# Patient Record
Sex: MALE | URBAN - METROPOLITAN AREA
[De-identification: names, ages, dates, MRNs, and addresses within clinical notes are randomized per-mention and may not be internally consistent; named-entity substitution may affect disease eponyms.]

---

## 2023-08-15 ENCOUNTER — APPOINTMENT (RX ONLY)
Dept: URBAN - METROPOLITAN AREA CLINIC 77 | Facility: CLINIC | Age: 48
Setting detail: DERMATOLOGY
End: 2023-08-15

## 2023-08-15 DIAGNOSIS — L71.8 OTHER ROSACEA: ICD-10-CM

## 2023-08-15 DIAGNOSIS — Z71.89 OTHER SPECIFIED COUNSELING: ICD-10-CM

## 2023-08-15 PROCEDURE — ? MEDICATION COUNSELING

## 2023-08-15 PROCEDURE — ? LOCATION MARKER (DETAILED)

## 2023-08-15 PROCEDURE — ? COUNSELING

## 2023-08-15 PROCEDURE — ? PRESCRIPTION

## 2023-08-15 PROCEDURE — ? TREATMENT REGIMEN

## 2023-08-15 PROCEDURE — ? SUNSCREEN RECOMMENDATIONS

## 2023-08-15 PROCEDURE — 99203 OFFICE O/P NEW LOW 30 MIN: CPT

## 2023-08-15 RX ORDER — CLINDAMYCIN PHOSPHATE 10 MG/ML
SOLUTION TOPICAL
Qty: 60 | Refills: 0 | Status: ERX | COMMUNITY
Start: 2023-08-15

## 2023-08-15 RX ORDER — MINOCYCLINE HYDROCHLORIDE 100 MG/1
CAPSULE ORAL BID
Qty: 60 | Refills: 3 | Status: ERX | COMMUNITY
Start: 2023-08-15

## 2023-08-15 RX ADMIN — CLINDAMYCIN PHOSPHATE: 10 SOLUTION TOPICAL at 00:00

## 2023-08-15 RX ADMIN — MINOCYCLINE HYDROCHLORIDE: 100 CAPSULE ORAL at 00:00

## 2023-08-15 ASSESSMENT — LOCATION ZONE DERM: LOCATION ZONE: FACE

## 2023-08-15 ASSESSMENT — LOCATION DETAILED DESCRIPTION DERM
LOCATION DETAILED: RIGHT CENTRAL MALAR CHEEK
LOCATION DETAILED: LEFT CENTRAL MALAR CHEEK

## 2023-08-15 ASSESSMENT — LOCATION SIMPLE DESCRIPTION DERM
LOCATION SIMPLE: LEFT CHEEK
LOCATION SIMPLE: RIGHT CHEEK

## 2023-08-15 NOTE — PROCEDURE: MEDICATION COUNSELING
Sski Pregnancy And Lactation Text: This medication is Pregnancy Category D and isn't considered safe during pregnancy. It is excreted in breast milk.
Azelaic Acid Pregnancy And Lactation Text: This medication is considered safe during pregnancy and breast feeding.
Dupixent Counseling: I discussed with the patient the risks of dupilumab including but not limited to eye infection and irritation, cold sores, injection site reactions, worsening of asthma, allergic reactions and increased risk of parasitic infection.  Live vaccines should be avoided while taking dupilumab. Dupilumab will also interact with certain medications such as warfarin and cyclosporine. The patient understands that monitoring is required and they must alert us or the primary physician if symptoms of infection or other concerning signs are noted.
Ivermectin Counseling:  Patient instructed to take medication on an empty stomach with a full glass of water.  Patient informed of potential adverse effects including but not limited to nausea, diarrhea, dizziness, itching, and swelling of the extremities or lymph nodes.  The patient verbalized understanding of the proper use and possible adverse effects of ivermectin.  All of the patient's questions and concerns were addressed.
Colchicine Pregnancy And Lactation Text: This medication is Pregnancy Category C and isn't considered safe during pregnancy. It is excreted in breast milk.
Cellcept Counseling:  I discussed with the patient the risks of mycophenolate mofetil including but not limited to infection/immunosuppression, GI upset, hypokalemia, hypercholesterolemia, bone marrow suppression, lymphoproliferative disorders, malignancy, GI ulceration/bleed/perforation, colitis, interstitial lung disease, kidney failure, progressive multifocal leukoencephalopathy, and birth defects.  The patient understands that monitoring is required including a baseline creatinine and regular CBC testing. In addition, patient must alert us immediately if symptoms of infection or other concerning signs are noted.
Infliximab Pregnancy And Lactation Text: This medication is Pregnancy Category B and is considered safe during pregnancy. It is unknown if this medication is excreted in breast milk.
Opzelura Pregnancy And Lactation Text: There is insufficient data to evaluate drug-associated risk for major birth defects, miscarriage, or other adverse maternal or fetal outcomes.  There is a pregnancy registry that monitors pregnancy outcomes in pregnant persons exposed to the medication during pregnancy.  It is unknown if this medication is excreted in breast milk.  Do not breastfeed during treatment and for about 4 weeks after the last dose.
Wartpeel Pregnancy And Lactation Text: This medication is Pregnancy Category X and contraindicated in pregnancy and in women who may become pregnant. It is unknown if this medication is excreted in breast milk.
Clindamycin Pregnancy And Lactation Text: This medication can be used in pregnancy if certain situations. Clindamycin is also present in breast milk.
Low Dose Naltrexone Counseling- I discussed with the patient the potential risks and side effects of low dose naltrexone including but not limited to: more vivid dreams, headaches, nausea, vomiting, abdominal pain, fatigue, dizziness, and anxiety.
Use Enhanced Medication Counseling?: No
Stelara Counseling:  I discussed with the patient the risks of ustekinumab including but not limited to immunosuppression, malignancy, posterior leukoencephalopathy syndrome, and serious infections.  The patient understands that monitoring is required including a PPD at baseline and must alert us or the primary physician if symptoms of infection or other concerning signs are noted.
Erivedge Counseling- I discussed with the patient the risks of Erivedge including but not limited to nausea, vomiting, diarrhea, constipation, weight loss, changes in the sense of taste, decreased appetite, muscle spasms, and hair loss.  The patient verbalized understanding of the proper use and possible adverse effects of Erivedge.  All of the patient's questions and concerns were addressed.
Solaraze Counseling:  I discussed with the patient the risks of Solaraze including but not limited to erythema, scaling, itching, weeping, crusting, and pain.
Fluconazole Counseling:  Patient counseled regarding adverse effects of fluconazole including but not limited to headache, diarrhea, nausea, upset stomach, liver function test abnormalities, taste disturbance, and stomach pain.  There is a rare possibility of liver failure that can occur when taking fluconazole.  The patient understands that monitoring of LFTs and kidney function test may be required, especially at baseline. The patient verbalized understanding of the proper use and possible adverse effects of fluconazole.  All of the patient's questions and concerns were addressed.
Quinolones Counseling:  I discussed with the patient the risks of fluoroquinolones including but not limited to GI upset, allergic reaction, drug rash, diarrhea, dizziness, photosensitivity, yeast infections, liver function test abnormalities, tendonitis/tendon rupture.
Isotretinoin Pregnancy And Lactation Text: This medication is Pregnancy Category X and is considered extremely dangerous during pregnancy. It is unknown if it is excreted in breast milk.
Drysol Counseling:  I discussed with the patient the risks of drysol/aluminum chloride including but not limited to skin rash, itching, irritation, burning.
Finasteride Male Counseling: Finasteride Counseling:  I discussed with the patient the risks of use of finasteride including but not limited to decreased libido, decreased ejaculate volume, gynecomastia, and depression. Women should not handle medication.  All of the patient's questions and concerns were addressed.
Topical Ketoconazole Pregnancy And Lactation Text: This medication is Pregnancy Category B and is considered safe during pregnancy. It is unknown if it is excreted in breast milk.
Dupixent Pregnancy And Lactation Text: This medication likely crosses the placenta but the risk for the fetus is uncertain. This medication is excreted in breast milk.
Imiquimod Pregnancy And Lactation Text: This medication is Pregnancy Category C. It is unknown if this medication is excreted in breast milk.
Benzoyl Peroxide Counseling: Patient counseled that medicine may cause skin irritation and bleach clothing.  In the event of skin irritation, the patient was advised to reduce the amount of the drug applied or use it less frequently.   The patient verbalized understanding of the proper use and possible adverse effects of benzoyl peroxide.  All of the patient's questions and concerns were addressed.
Dapsone Counseling: I discussed with the patient the risks of dapsone including but not limited to hemolytic anemia, agranulocytosis, rashes, methemoglobinemia, kidney failure, peripheral neuropathy, headaches, GI upset, and liver toxicity.  Patients who start dapsone require monitoring including baseline LFTs and weekly CBCs for the first month, then every month thereafter.  The patient verbalized understanding of the proper use and possible adverse effects of dapsone.  All of the patient's questions and concerns were addressed.
Rinvoq Pregnancy And Lactation Text: Based on animal studies, Rinvoq may cause embryo-fetal harm when administered to pregnant women.  The medication should not be used in pregnancy.  Breastfeeding is not recommended during treatment and for 6 days after the last dose.
Terbinafine Pregnancy And Lactation Text: This medication is Pregnancy Category B and is considered safe during pregnancy. It is also excreted in breast milk and breast feeding isn't recommended.
Oral Minoxidil Pregnancy And Lactation Text: This medication should only be used when clearly needed if you are pregnant, attempting to become pregnant or breast feeding.
Picato Counseling:  I discussed with the patient the risks of Picato including but not limited to erythema, scaling, itching, weeping, crusting, and pain.
Opioid Counseling: I discussed with the patient the potential side effects of opioids including but not limited to addiction, altered mental status, and depression. I stressed avoiding alcohol, benzodiazepines, muscle relaxants and sleep aids unless specifically okayed by a physician. The patient verbalized understanding of the proper use and possible adverse effects of opioids. All of the patient's questions and concerns were addressed. They were instructed to flush the remaining pills down the toilet if they did not need them for pain.
Doxycycline Counseling:  Patient counseled regarding possible photosensitivity and increased risk for sunburn.  Patient instructed to avoid sunlight, if possible.  When exposed to sunlight, patients should wear protective clothing, sunglasses, and sunscreen.  The patient was instructed to call the office immediately if the following severe adverse effects occur:  hearing changes, easy bruising/bleeding, severe headache, or vision changes.  The patient verbalized understanding of the proper use and possible adverse effects of doxycycline.  All of the patient's questions and concerns were addressed.
Ivermectin Pregnancy And Lactation Text: This medication is Pregnancy Category C and it isn't known if it is safe during pregnancy. It is also excreted in breast milk.
Winlevi Counseling:  I discussed with the patient the risks of topical clascoterone including but not limited to erythema, scaling, itching, and stinging. Patient voiced their understanding.
Thalidomide Counseling: I discussed with the patient the risks of thalidomide including but not limited to birth defects, anxiety, weakness, chest pain, dizziness, cough and severe allergy.
Low Dose Naltrexone Pregnancy And Lactation Text: Naltrexone is pregnancy category C.  There have been no adequate and well-controlled studies in pregnant women.  It should be used in pregnancy only if the potential benefit justifies the potential risk to the fetus.   Limited data indicates that naltrexone is minimally excreted into breastmilk.
Finasteride Pregnancy And Lactation Text: This medication is absolutely contraindicated during pregnancy. It is unknown if it is excreted in breast milk.
Cellcept Pregnancy And Lactation Text: This medication is Pregnancy Category D and isn't considered safe during pregnancy. It is unknown if this medication is excreted in breast milk.
Fluconazole Pregnancy And Lactation Text: This medication is Pregnancy Category C and it isn't know if it is safe during pregnancy. It is also excreted in breast milk.
Rituxan Counseling:  I discussed with the patient the risks of Rituxan infusions. Side effects can include infusion reactions, severe drug rashes including mucocutaneous reactions, reactivation of latent hepatitis and other infections and rarely progressive multifocal leukoencephalopathy.  All of the patient's questions and concerns were addressed.
Azithromycin Counseling:  I discussed with the patient the risks of azithromycin including but not limited to GI upset, allergic reaction, drug rash, diarrhea, and yeast infections.
Sotyktu Counseling:  I discussed the most common side effects of Sotyktu including: common cold, sore throat, sinus infections, cold sores, canker sores, folliculitis, and acne.? I also discussed more serious side effects of Sotyktu including but not limited to: serious allergic reactions; increased risk for infections such as TB; cancers such as lymphomas; rhabdomyolysis and elevated CPK; and elevated triglycerides and liver enzymes.?
Topical Metronidazole Counseling: Metronidazole is a topical antibiotic medication. You may experience burning, stinging, redness, or allergic reactions.  Please call our office if you develop any problems from using this medication.
Solaraze Pregnancy And Lactation Text: This medication is Pregnancy Category B and is considered safe. There is some data to suggest avoiding during the third trimester. It is unknown if this medication is excreted in breast milk.
Otezla Counseling: The side effects of Otezla were discussed with the patient, including but not limited to worsening or new depression, weight loss, diarrhea, nausea, upper respiratory tract infection, and headache. Patient instructed to call the office should any adverse effect occur.  The patient verbalized understanding of the proper use and possible adverse effects of Otezla.  All the patient's questions and concerns were addressed.
High Dose Vitamin A Counseling: Side effects reviewed, pt to contact office should one occur.
Erivedge Pregnancy And Lactation Text: This medication is Pregnancy Category X and is absolutely contraindicated during pregnancy. It is unknown if it is excreted in breast milk.
Enbrel Counseling:  I discussed with the patient the risks of etanercept including but not limited to myelosuppression, immunosuppression, autoimmune hepatitis, demyelinating diseases, lymphoma, and infections.  The patient understands that monitoring is required including a PPD at baseline and must alert us or the primary physician if symptoms of infection or other concerning signs are noted.
Benzoyl Peroxide Pregnancy And Lactation Text: This medication is Pregnancy Category C. It is unknown if benzoyl peroxide is excreted in breast milk.
Dapsone Pregnancy And Lactation Text: This medication is Pregnancy Category C and is not considered safe during pregnancy or breast feeding.
Opioid Pregnancy And Lactation Text: These medications can lead to premature delivery and should be avoided during pregnancy. These medications are also present in breast milk in small amounts.
Klisyri Counseling:  I discussed with the patient the risks of Klisyri including but not limited to erythema, scaling, itching, weeping, crusting, and pain.
Cimetidine Counseling:  I discussed with the patient the risks of Cimetidine including but not limited to gynecomastia, headache, diarrhea, nausea, drowsiness, arrhythmias, pancreatitis, skin rashes, psychosis, bone marrow suppression and kidney toxicity.
Cyclophosphamide Counseling:  I discussed with the patient the risks of cyclophosphamide including but not limited to hair loss, hormonal abnormalities, decreased fertility, abdominal pain, diarrhea, nausea and vomiting, bone marrow suppression and infection. The patient understands that monitoring is required while taking this medication.
Winlevi Pregnancy And Lactation Text: This medication is considered safe during pregnancy and breastfeeding.
Rituxan Pregnancy And Lactation Text: This medication is Pregnancy Category C and it isn't know if it is safe during pregnancy. It is unknown if this medication is excreted in breast milk but similar antibodies are known to be excreted.
Niacinamide Counseling: I recommended taking niacin or niacinamide, also know as vitamin B3, twice daily. Recent evidence suggests that taking vitamin B3 (500 mg twice daily) can reduce the risk of actinic keratoses and non-melanoma skin cancers. Side effects of vitamin B3 include flushing and headache.
Doxycycline Pregnancy And Lactation Text: This medication is Pregnancy Category D and not consider safe during pregnancy. It is also excreted in breast milk but is considered safe for shorter treatment courses.
Libtayo Counseling- I discussed with the patient the risks of Libtayo including but not limited to nausea, vomiting, diarrhea, and bone or muscle pain.  The patient verbalized understanding of the proper use and possible adverse effects of Libtayo.  All of the patient's questions and concerns were addressed.
Soolantra Counseling: I discussed with the patients the risks of topial Soolantra. This is a medicine which decreases the number of mites and inflammation in the skin. You experience burning, stinging, eye irritation or allergic reactions.  Please call our office if you develop any problems from using this medication.
Birth Control Pills Counseling: Birth Control Pill Counseling: I discussed with the patient the potential side effects of OCPs including but not limited to increased risk of stroke, heart attack, thrombophlebitis, deep venous thrombosis, hepatic adenomas, breast changes, GI upset, headaches, and depression.  The patient verbalized understanding of the proper use and possible adverse effects of OCPs. All of the patient's questions and concerns were addressed.
Griseofulvin Counseling:  I discussed with the patient the risks of griseofulvin including but not limited to photosensitivity, cytopenia, liver damage, nausea/vomiting and severe allergy.  The patient understands that this medication is best absorbed when taken with a fatty meal (e.g., ice cream or french fries).
Elidel Counseling: Patient may experience a mild burning sensation during topical application. Elidel is not approved in children less than 2 years of age. There have been case reports of hematologic and skin malignancies in patients using topical calcineurin inhibitors although causality is questionable.
High Dose Vitamin A Pregnancy And Lactation Text: High dose vitamin A therapy is contraindicated during pregnancy and breast feeding.
Topical Retinoid counseling:  Patient advised to apply a pea-sized amount only at bedtime and wait 30 minutes after washing their face before applying.  If too drying, patient may add a non-comedogenic moisturizer. The patient verbalized understanding of the proper use and possible adverse effects of retinoids.  All of the patient's questions and concerns were addressed.
Azithromycin Pregnancy And Lactation Text: This medication is considered safe during pregnancy and is also secreted in breast milk.
Sotyktu Pregnancy And Lactation Text: There is insufficient data to evaluate whether or not Sotyktu is safe to use during pregnancy.? ?It is not known if Sotyktu passes into breast milk and whether or not it is safe to use when breastfeeding.??
Topical Metronidazole Pregnancy And Lactation Text: This medication is Pregnancy Category B and considered safe during pregnancy.  It is also considered safe to use while breastfeeding.
Otezla Pregnancy And Lactation Text: This medication is Pregnancy Category C and it isn't known if it is safe during pregnancy. It is unknown if it is excreted in breast milk.
Detail Level: Simple
Adbry Counseling: I discussed with the patient the risks of tralokinumab including but not limited to eye infection and irritation, cold sores, injection site reactions, worsening of asthma, allergic reactions and increased risk of parasitic infection.  Live vaccines should be avoided while taking tralokinumab. The patient understands that monitoring is required and they must alert us or the primary physician if symptoms of infection or other concerning signs are noted.
Rifampin Counseling: I discussed with the patient the risks of rifampin including but not limited to liver damage, kidney damage, red-orange body fluids, nausea/vomiting and severe allergy.
Taltz Counseling: I discussed with the patient the risks of ixekizumab including but not limited to immunosuppression, serious infections, worsening of inflammatory bowel disease and drug reactions.  The patient understands that monitoring is required including a PPD at baseline and must alert us or the primary physician if symptoms of infection or other concerning signs are noted.
Erythromycin Counseling:  I discussed with the patient the risks of erythromycin including but not limited to GI upset, allergic reaction, drug rash, diarrhea, increase in liver enzymes, and yeast infections.
Prednisone Pregnancy And Lactation Text: This medication is Pregnancy Category C and it isn't know if it is safe during pregnancy. This medication is excreted in breast milk.
VTAMA Counseling: I discussed with the patient that VTAMA is not for use in the eyes, mouth or mouth. They should call the office if they develop any signs of allergic reactions to VTAMA. The patient verbalized understanding of the proper use and possible adverse effects of VTAMA.  All of the patient's questions and concerns were addressed.
Gabapentin Counseling: I discussed with the patient the risks of gabapentin including but not limited to dizziness, somnolence, fatigue and ataxia.
Tranexamic Acid Counseling:  Patient advised of the small risk of bleeding problems with tranexamic acid. They were also instructed to call if they developed any nausea, vomiting or diarrhea. All of the patient's questions and concerns were addressed.
Klisyri Pregnancy And Lactation Text: It is unknown if this medication can harm a developing fetus or if it is excreted in breast milk.
Carac Counseling:  I discussed with the patient the risks of Carac including but not limited to erythema, scaling, itching, weeping, crusting, and pain.
Siliq Counseling:  I discussed with the patient the risks of Siliq including but not limited to new or worsening depression, suicidal thoughts and behavior, immunosuppression, malignancy, posterior leukoencephalopathy syndrome, and serious infections.  The patient understands that monitoring is required including a PPD at baseline and must alert us or the primary physician if symptoms of infection or other concerning signs are noted. There is also a special program designed to monitor depression which is required with Siliq.
Griseofulvin Pregnancy And Lactation Text: This medication is Pregnancy Category X and is known to cause serious birth defects. It is unknown if this medication is excreted in breast milk but breast feeding should be avoided.
Birth Control Pills Pregnancy And Lactation Text: This medication should be avoided if pregnant and for the first 30 days post-partum.
Niacinamide Pregnancy And Lactation Text: These medications are considered safe during pregnancy.
Protopic Counseling: Patient may experience a mild burning sensation during topical application. Protopic is not approved in children less than 2 years of age. There have been case reports of hematologic and skin malignancies in patients using topical calcineurin inhibitors although causality is questionable.
Libtayo Pregnancy And Lactation Text: This medication is contraindicated in pregnancy and when breast feeding.
Soolantra Pregnancy And Lactation Text: This medication is Pregnancy Category C. This medication is considered safe during breast feeding.
Oxybutynin Counseling:  I discussed with the patient the risks of oxybutynin including but not limited to skin rash, drowsiness, dry mouth, difficulty urinating, and blurred vision.
Arava Counseling:  Patient counseled regarding adverse effects of Arava including but not limited to nausea, vomiting, abnormalities in liver function tests. Patients may develop mouth sores, rash, diarrhea, and abnormalities in blood counts. The patient understands that monitoring is required including LFTs and blood counts.  There is a rare possibility of scarring of the liver and lung problems that can occur when taking methotrexate. Persistent nausea, loss of appetite, pale stools, dark urine, cough, and shortness of breath should be reported immediately. Patient advised to discontinue Arava treatment and consult with a physician prior to attempting conception. The patient will have to undergo a treatment to eliminate Arava from the body prior to conception.
Rifampin Pregnancy And Lactation Text: This medication is Pregnancy Category C and it isn't know if it is safe during pregnancy. It is also excreted in breast milk and should not be used if you are breast feeding.
Cyclophosphamide Pregnancy And Lactation Text: This medication is Pregnancy Category D and it isn't considered safe during pregnancy. This medication is excreted in breast milk.
Adbry Pregnancy And Lactation Text: It is unknown if this medication will adversely affect pregnancy or breast feeding.
Minoxidil Counseling: Minoxidil is a topical medication which can increase blood flow where it is applied. It is uncertain how this medication increases hair growth. Side effects are uncommon and include stinging and allergic reactions.
Topical Steroids Counseling: I discussed with the patient that prolonged use of topical steroids can result in the increased appearance of superficial blood vessels (telangiectasias), lightening (hypopigmentation) and thinning of the skin (atrophy).  Patient understands to avoid using high potency steroids in skin folds, the groin or the face.  The patient verbalized understanding of the proper use and possible adverse effects of topical steroids.  All of the patient's questions and concerns were addressed.
Bactrim Counseling:  I discussed with the patient the risks of sulfa antibiotics including but not limited to GI upset, allergic reaction, drug rash, diarrhea, dizziness, photosensitivity, and yeast infections.  Rarely, more serious reactions can occur including but not limited to aplastic anemia, agranulocytosis, methemoglobinemia, blood dyscrasias, liver or kidney failure, lung infiltrates or desquamative/blistering drug rashes.
Xeljanz Counseling: I discussed with the patient the risks of Xeljanz therapy including increased risk of infection, liver issues, headache, diarrhea, or cold symptoms. Live vaccines should be avoided. They were instructed to call if they have any problems.
Taltz Pregnancy And Lactation Text: The risk during pregnancy and breastfeeding is uncertain with this medication.
Vtama Pregnancy And Lactation Text: It is unknown if this medication can cause problems during pregnancy and breastfeeding.
Tranexamic Acid Pregnancy And Lactation Text: It is unknown if this medication is safe during pregnancy or breast feeding.
Humira Counseling:  I discussed with the patient the risks of adalimumab including but not limited to myelosuppression, immunosuppression, autoimmune hepatitis, demyelinating diseases, lymphoma, and serious infections.  The patient understands that monitoring is required including a PPD at baseline and must alert us or the primary physician if symptoms of infection or other concerning signs are noted.
Eucrisa Counseling: Patient may experience a mild burning sensation during topical application. Eucrisa is not approved in children less than 3 months of age.
Tazorac Counseling:  Patient advised that medication is irritating and drying.  Patient may need to apply sparingly and wash off after an hour before eventually leaving it on overnight.  The patient verbalized understanding of the proper use and possible adverse effects of tazorac.  All of the patient's questions and concerns were addressed.
Odomzo Counseling- I discussed with the patient the risks of Odomzo including but not limited to nausea, vomiting, diarrhea, constipation, weight loss, changes in the sense of taste, decreased appetite, muscle spasms, and hair loss.  The patient verbalized understanding of the proper use and possible adverse effects of Odomzo.  All of the patient's questions and concerns were addressed.
Cyclosporine Counseling:  I discussed with the patient the risks of cyclosporine including but not limited to hypertension, gingival hyperplasia,myelosuppression, immunosuppression, liver damage, kidney damage, neurotoxicity, lymphoma, and serious infections. The patient understands that monitoring is required including baseline blood pressure, CBC, CMP, lipid panel and uric acid, and then 1-2 times monthly CMP and blood pressure.
Cibinqo Counseling: I discussed with the patient the risks of Cibinqo therapy including but not limited to common cold, nausea, headache, cold sores, increased blood CPK levels, dizziness, UTIs, fatigue, acne, and vomitting. Live vaccines should be avoided.  This medication has been linked to serious infections; higher rate of mortality; malignancy and lymphoproliferative disorders; major adverse cardiovascular events; thrombosis; thrombocytopenia and lymphopenia; lipid elevations; and retinal detachment.
Doxepin Counseling:  Patient advised that the medication is sedating and not to drive a car after taking this medication. Patient informed of potential adverse effects including but not limited to dry mouth, urinary retention, and blurry vision.  The patient verbalized understanding of the proper use and possible adverse effects of doxepin.  All of the patient's questions and concerns were addressed.
Protopic Pregnancy And Lactation Text: This medication is Pregnancy Category C. It is unknown if this medication is excreted in breast milk when applied topically.
Erythromycin Pregnancy And Lactation Text: This medication is Pregnancy Category B and is considered safe during pregnancy. It is also excreted in breast milk.
Nsaids Counseling: NSAID Counseling: I discussed with the patient that NSAIDs should be taken with food. Prolonged use of NSAIDs can result in the development of stomach ulcers.  Patient advised to stop taking NSAIDs if abdominal pain occurs.  The patient verbalized understanding of the proper use and possible adverse effects of NSAIDs.  All of the patient's questions and concerns were addressed.
Tremfya Counseling: I discussed with the patient the risks of guselkumab including but not limited to immunosuppression, serious infections, and drug reactions.  The patient understands that monitoring is required including a PPD at baseline and must alert us or the primary physician if symptoms of infection or other concerning signs are noted.
Sarecycline Counseling: Patient advised regarding possible photosensitivity and discoloration of the teeth, skin, lips, tongue and gums.  Patient instructed to avoid sunlight, if possible.  When exposed to sunlight, patients should wear protective clothing, sunglasses, and sunscreen.  The patient was instructed to call the office immediately if the following severe adverse effects occur:  hearing changes, easy bruising/bleeding, severe headache, or vision changes.  The patient verbalized understanding of the proper use and possible adverse effects of sarecycline.  All of the patient's questions and concerns were addressed.
Cimzia Counseling:  I discussed with the patient the risks of Cimzia including but not limited to immunosuppression, allergic reactions and infections.  The patient understands that monitoring is required including a PPD at baseline and must alert us or the primary physician if symptoms of infection or other concerning signs are noted.
Spironolactone Counseling: Patient advised regarding risks of diarrhea, abdominal pain, hyperkalemia, birth defects (for female patients), liver toxicity and renal toxicity. The patient may need blood work to monitor liver and kidney function and potassium levels while on therapy. The patient verbalized understanding of the proper use and possible adverse effects of spironolactone.  All of the patient's questions and concerns were addressed.
Itraconazole Counseling:  I discussed with the patient the risks of itraconazole including but not limited to liver damage, nausea/vomiting, neuropathy, and severe allergy.  The patient understands that this medication is best absorbed when taken with acidic beverages such as non-diet cola or ginger ale.  The patient understands that monitoring is required including baseline LFTs and repeat LFTs at intervals.  The patient understands that they are to contact us or the primary physician if concerning signs are noted.
Glycopyrrolate Counseling:  I discussed with the patient the risks of glycopyrrolate including but not limited to skin rash, drowsiness, dry mouth, difficulty urinating, and blurred vision.
Valtrex Counseling: I discussed with the patient the risks of valacyclovir including but not limited to kidney damage, nausea, vomiting and severe allergy.  The patient understands that if the infection seems to be worsening or is not improving, they are to call.
Minoxidil Pregnancy And Lactation Text: This medication has not been assigned a Pregnancy Risk Category but animal studies failed to show danger with the topical medication. It is unknown if the medication is excreted in breast milk.
Calcipotriene Counseling:  I discussed with the patient the risks of calcipotriene including but not limited to erythema, scaling, itching, and irritation.
Bactrim Pregnancy And Lactation Text: This medication is Pregnancy Category D and is known to cause fetal risk.  It is also excreted in breast milk.
Xelnaldoz Pregnancy And Lactation Text: This medication is Pregnancy Category D and is not considered safe during pregnancy.  The risk during breast feeding is also uncertain.
Topical Steroids Applications Pregnancy And Lactation Text: Most topical steroids are considered safe to use during pregnancy and lactation.  Any topical steroid applied to the breast or nipple should be washed off before breastfeeding.
Hydroxyzine Pregnancy And Lactation Text: This medication is not safe during pregnancy and should not be taken. It is also excreted in breast milk and breast feeding isn't recommended.
Qbrexza Counseling:  I discussed with the patient the risks of Qbrexza including but not limited to headache, mydriasis, blurred vision, dry eyes, nasal dryness, dry mouth, dry throat, dry skin, urinary hesitation, and constipation.  Local skin reactions including erythema, burning, stinging, and itching can also occur.
Metronidazole Counseling:  I discussed with the patient the risks of metronidazole including but not limited to seizures, nausea/vomiting, a metallic taste in the mouth, nausea/vomiting and severe allergy.
Zoryve Counseling:  I discussed with the patient that Zoryve is not for use in the eyes, mouth or vagina. The most commonly reported side effects include diarrhea, headache, insomnia, application site pain, upper respiratory tract infections, and urinary tract infections.  All of the patient's questions and concerns were addressed.
Calcipotriene Pregnancy And Lactation Text: The use of this medication during pregnancy or lactation is not recommended as there is insufficient data.
Cimzia Pregnancy And Lactation Text: This medication crosses the placenta but can be considered safe in certain situations. Cimzia may be excreted in breast milk.
Tazorac Pregnancy And Lactation Text: This medication is not safe during pregnancy. It is unknown if this medication is excreted in breast milk.
Aklief counseling:  Patient advised to apply a pea-sized amount only at bedtime and wait 30 minutes after washing their face before applying.  If too drying, patient may add a non-comedogenic moisturizer.  The most commonly reported side effects including irritation, redness, scaling, dryness, stinging, burning, itching, and increased risk of sunburn.  The patient verbalized understanding of the proper use and possible adverse effects of retinoids.  All of the patient's questions and concerns were addressed.
Cibinqo Pregnancy And Lactation Text: It is unknown if this medication will adversely affect pregnancy or breast feeding.  You should not take this medication if you are currently pregnant or planning a pregnancy or while breastfeeding.
Clofazimine Counseling:  I discussed with the patient the risks of clofazimine including but not limited to skin and eye pigmentation, liver damage, nausea/vomiting, gastrointestinal bleeding and allergy.
Nsaids Pregnancy And Lactation Text: These medications are considered safe up to 30 weeks gestation. It is excreted in breast milk.
Doxepin Pregnancy And Lactation Text: This medication is Pregnancy Category C and it isn't known if it is safe during pregnancy. It is also excreted in breast milk and breast feeding isn't recommended.
Spironolactone Pregnancy And Lactation Text: This medication can cause feminization of the male fetus and should be avoided during pregnancy. The active metabolite is also found in breast milk.
Simponi Counseling:  I discussed with the patient the risks of golimumab including but not limited to myelosuppression, immunosuppression, autoimmune hepatitis, demyelinating diseases, lymphoma, and serious infections.  The patient understands that monitoring is required including a PPD at baseline and must alert us or the primary physician if symptoms of infection or other concerning signs are noted.
Mirvaso Counseling: Mirvaso is a topical medication which can decrease superficial blood flow where applied. Side effects are uncommon and include stinging, redness and allergic reactions.
Topical Sulfur Applications Counseling: Topical Sulfur Counseling: Patient counseled that this medication may cause skin irritation or allergic reactions.  In the event of skin irritation, the patient was advised to reduce the amount of the drug applied or use it less frequently.   The patient verbalized understanding of the proper use and possible adverse effects of topical sulfur application.  All of the patient's questions and concerns were addressed.
Cephalexin Counseling: I counseled the patient regarding use of cephalexin as an antibiotic for prophylactic and/or therapeutic purposes. Cephalexin (commonly prescribed under brand name Keflex) is a cephalosporin antibiotic which is active against numerous classes of bacteria, including most skin bacteria. Side effects may include nausea, diarrhea, gastrointestinal upset, rash, hives, yeast infections, and in rare cases, hepatitis, kidney disease, seizures, fever, confusion, neurologic symptoms, and others. Patients with severe allergies to penicillin medications are cautioned that there is about a 10% incidence of cross-reactivity with cephalosporins. When possible, patients with penicillin allergies should use alternatives to cephalosporins for antibiotic therapy.
Propranolol Counseling:  I discussed with the patient the risks of propranolol including but not limited to low heart rate, low blood pressure, low blood sugar, restlessness and increased cold sensitivity. They should call the office if they experience any of these side effects.
Sarecycline Pregnancy And Lactation Text: This medication is Pregnancy Category D and not consider safe during pregnancy. It is also excreted in breast milk.
Glycopyrrolate Pregnancy And Lactation Text: This medication is Pregnancy Category B and is considered safe during pregnancy. It is unknown if it is excreted breast milk.
Cantharidin Counseling:  I discussed with the patient the risks of Cantharidin including but not limited to pain, redness, burning, itching, and blistering.
Ilumya Counseling: I discussed with the patient the risks of tildrakizumab including but not limited to immunosuppression, malignancy, posterior leukoencephalopathy syndrome, and serious infections.  The patient understands that monitoring is required including a PPD at baseline and must alert us or the primary physician if symptoms of infection or other concerning signs are noted.
Acitretin Counseling:  I discussed with the patient the risks of acitretin including but not limited to hair loss, dry lips/skin/eyes, liver damage, hyperlipidemia, depression/suicidal ideation, photosensitivity.  Serious rare side effects can include but are not limited to pancreatitis, pseudotumor cerebri, bony changes, clot formation/stroke/heart attack.  Patient understands that alcohol is contraindicated since it can result in liver toxicity and significantly prolong the elimination of the drug by many years.
Olumiant Counseling: I discussed with the patient the risks of Olumiant therapy including but not limited to upper respiratory tract infections, shingles, cold sores, and nausea. Live vaccines should be avoided.  This medication has been linked to serious infections; higher rate of mortality; malignancy and lymphoproliferative disorders; major adverse cardiovascular events; thrombosis; gastrointestinal perforations; neutropenia; lymphopenia; anemia; liver enzyme elevations; and lipid elevations.
Hydroxyzine Counseling: Patient advised that the medication is sedating and not to drive a car after taking this medication.  Patient informed of potential adverse effects including but not limited to dry mouth, urinary retention, and blurry vision.  The patient verbalized understanding of the proper use and possible adverse effects of hydroxyzine.  All of the patient's questions and concerns were addressed.
Qbrexza Pregnancy And Lactation Text: There is no available data on Qbrexza use in pregnant women.  There is no available data on Qbrexza use in lactation.
Olanzapine Counseling- I discussed with the patient the common side effects of olanzapine including but are not limited to: lack of energy, dry mouth, increased appetite, sleepiness, tremor, constipation, dizziness, changes in behavior, or restlessness.  Explained that teenagers are more likely to experience headaches, abdominal pain, pain in the arms or legs, tiredness, and sleepiness.  Serious side effects include but are not limited: increased risk of death in elderly patients who are confused, have memory loss, or dementia-related psychosis; hyperglycemia; increased cholesterol and triglycerides; and weight gain.
Methotrexate Counseling:  Patient counseled regarding adverse effects of methotrexate including but not limited to nausea, vomiting, abnormalities in liver function tests. Patients may develop mouth sores, rash, diarrhea, and abnormalities in blood counts. The patient understands that monitoring is required including LFT's and blood counts.  There is a rare possibility of scarring of the liver and lung problems that can occur when taking methotrexate. Persistent nausea, loss of appetite, pale stools, dark urine, cough, and shortness of breath should be reported immediately. Patient advised to discontinue methotrexate treatment at least three months before attempting to become pregnant.  I discussed the need for folate supplements while taking methotrexate.  These supplements can decrease side effects during methotrexate treatment. The patient verbalized understanding of the proper use and possible adverse effects of methotrexate.  All of the patient's questions and concerns were addressed.
Metronidazole Pregnancy And Lactation Text: This medication is Pregnancy Category B and considered safe during pregnancy.  It is also excreted in breast milk.
Bexarotene Counseling:  I discussed with the patient the risks of bexarotene including but not limited to hair loss, dry lips/skin/eyes, liver abnormalities, hyperlipidemia, pancreatitis, depression/suicidal ideation, photosensitivity, drug rash/allergic reactions, hypothyroidism, anemia, leukopenia, infection, cataracts, and teratogenicity.  Patient understands that they will need regular blood tests to check lipid profile, liver function tests, white blood cell count, thyroid function tests and pregnancy test if applicable.
Aklief Pregnancy And Lactation Text: It is unknown if this medication is safe to use during pregnancy.  It is unknown if this medication is excreted in breast milk.  Breastfeeding women should use the topical cream on the smallest area of the skin for the shortest time needed while breastfeeding.  Do not apply to nipple and areola.
Albendazole Counseling:  I discussed with the patient the risks of albendazole including but not limited to cytopenia, kidney damage, nausea/vomiting and severe allergy.  The patient understands that this medication is being used in an off-label manner.
Cosentyx Counseling:  I discussed with the patient the risks of Cosentyx including but not limited to worsening of Crohn's disease, immunosuppression, allergic reactions and infections.  The patient understands that monitoring is required including a PPD at baseline and must alert us or the primary physician if symptoms of infection or other concerning signs are noted.
Ketoconazole Counseling:   Patient counseled regarding improving absorption with orange juice.  Adverse effects include but are not limited to breast enlargement, headache, diarrhea, nausea, upset stomach, liver function test abnormalities, taste disturbance, and stomach pain.  There is a rare possibility of liver failure that can occur when taking ketoconazole. The patient understands that monitoring of LFTs may be required, especially at baseline. The patient verbalized understanding of the proper use and possible adverse effects of ketoconazole.  All of the patient's questions and concerns were addressed.
Topical Clindamycin Counseling: Patient counseled that this medication may cause skin irritation or allergic reactions.  In the event of skin irritation, the patient was advised to reduce the amount of the drug applied or use it less frequently.   The patient verbalized understanding of the proper use and possible adverse effects of clindamycin.  All of the patient's questions and concerns were addressed.
Hydroquinone Counseling:  Patient advised that medication may result in skin irritation, lightening (hypopigmentation), dryness, and burning.  In the event of skin irritation, the patient was advised to reduce the amount of the drug applied or use it less frequently.  Rarely, spots that are treated with hydroquinone can become darker (pseudoochronosis).  Should this occur, patient instructed to stop medication and call the office. The patient verbalized understanding of the proper use and possible adverse effects of hydroquinone.  All of the patient's questions and concerns were addressed.
Topical Sulfur Applications Pregnancy And Lactation Text: This medication is considered safe during pregnancy and breast feeding secondary to limited systemic absorption.
Cephalexin Pregnancy And Lactation Text: This medication is Pregnancy Category B and considered safe during pregnancy.  It is also excreted in breast milk but can be used safely for shorter doses.
Propranolol Pregnancy And Lactation Text: This medication is Pregnancy Category C and it isn't known if it is safe during pregnancy. It is excreted in breast milk.
Xolair Counseling:  Patient informed of potential adverse effects including but not limited to fever, muscle aches, rash and allergic reactions.  The patient verbalized understanding of the proper use and possible adverse effects of Xolair.  All of the patient's questions and concerns were addressed.
Tetracycline Counseling: Patient counseled regarding possible photosensitivity and increased risk for sunburn.  Patient instructed to avoid sunlight, if possible.  When exposed to sunlight, patients should wear protective clothing, sunglasses, and sunscreen.  The patient was instructed to call the office immediately if the following severe adverse effects occur:  hearing changes, easy bruising/bleeding, severe headache, or vision changes.  The patient verbalized understanding of the proper use and possible adverse effects of tetracycline.  All of the patient's questions and concerns were addressed. Patient understands to avoid pregnancy while on therapy due to potential birth defects.
Cantharidin Pregnancy And Lactation Text: This medication has not been proven safe during pregnancy. It is unknown if this medication is excreted in breast milk.
Rhofade Counseling: Rhofade is a topical medication which can decrease superficial blood flow where applied. Side effects are uncommon and include stinging, redness and allergic reactions.
Zyclara Counseling:  I discussed with the patient the risks of imiquimod including but not limited to erythema, scaling, itching, weeping, crusting, and pain.  Patient understands that the inflammatory response to imiquimod is variable from person to person and was educated regarded proper titration schedule.  If flu-like symptoms develop, patient knows to discontinue the medication and contact us.
Minocycline Counseling: Patient advised regarding possible photosensitivity and discoloration of the teeth, skin, lips, tongue and gums.  Patient instructed to avoid sunlight, if possible.  When exposed to sunlight, patients should wear protective clothing, sunglasses, and sunscreen.  The patient was instructed to call the office immediately if the following severe adverse effects occur:  hearing changes, easy bruising/bleeding, severe headache, or vision changes.  The patient verbalized understanding of the proper use and possible adverse effects of minocycline.  All of the patient's questions and concerns were addressed.
5-Fu Counseling: 5-Fluorouracil Counseling:  I discussed with the patient the risks of 5-fluorouracil including but not limited to erythema, scaling, itching, weeping, crusting, and pain.
Dutasteride Male Counseling: Dustasteride Counseling:  I discussed with the patient the risks of use of dutasteride including but not limited to decreased libido, decreased ejaculate volume, and gynecomastia. Women who can become pregnant should not handle medication.  All of the patient's questions and concerns were addressed.
Bexarotene Pregnancy And Lactation Text: This medication is Pregnancy Category X and should not be given to women who are pregnant or may become pregnant. This medication should not be used if you are breast feeding.
Hydroxychloroquine Counseling:  I discussed with the patient that a baseline ophthalmologic exam is needed at the start of therapy and every year thereafter while on therapy. A CBC may also be warranted for monitoring.  The side effects of this medication were discussed with the patient, including but not limited to agranulocytosis, aplastic anemia, seizures, rashes, retinopathy, and liver toxicity. Patient instructed to call the office should any adverse effect occur.  The patient verbalized understanding of the proper use and possible adverse effects of Plaquenil.  All the patient's questions and concerns were addressed.
Azathioprine Counseling:  I discussed with the patient the risks of azathioprine including but not limited to myelosuppression, immunosuppression, hepatotoxicity, lymphoma, and infections.  The patient understands that monitoring is required including baseline LFTs, Creatinine, possible TPMP genotyping and weekly CBCs for the first month and then every 2 weeks thereafter.  The patient verbalized understanding of the proper use and possible adverse effects of azathioprine.  All of the patient's questions and concerns were addressed.
Mirvaso Pregnancy And Lactation Text: This medication has not been assigned a Pregnancy Risk Category. It is unknown if the medication is excreted in breast milk.
Methotrexate Pregnancy And Lactation Text: This medication is Pregnancy Category X and is known to cause fetal harm. This medication is excreted in breast milk.
Olumiant Pregnancy And Lactation Text: Based on animal studies, Olumiant may cause embryo-fetal harm when administered to pregnant women.  The medication should not be used in pregnancy.  Breastfeeding is not recommended during treatment.
Ketoconazole Pregnancy And Lactation Text: This medication is Pregnancy Category C and it isn't know if it is safe during pregnancy. It is also excreted in breast milk and breast feeding isn't recommended.
Olanzapine Pregnancy And Lactation Text: This medication is pregnancy category C.   There are no adequate and well controlled trials with olanzapine in pregnant females.  Olanzapine should be used during pregnancy only if the potential benefit justifies the potential risk to the fetus.   In a study in lactating healthy women, olanzapine was excreted in breast milk.  It is recommended that women taking olanzapine should not breast feed.
Acitretin Pregnancy And Lactation Text: This medication is Pregnancy Category X and should not be given to women who are pregnant or may become pregnant in the future. This medication is excreted in breast milk.
Skyrizi Counseling: I discussed with the patient the risks of risankizumab-rzaa including but not limited to immunosuppression, and serious infections.  The patient understands that monitoring is required including a PPD at baseline and must alert us or the primary physician if symptoms of infection or other concerning signs are noted.
SSKI Counseling:  I discussed with the patient the risks of SSKI including but not limited to thyroid abnormalities, metallic taste, GI upset, fever, headache, acne, arthralgias, paraesthesias, lymphadenopathy, easy bleeding, arrhythmias, and allergic reaction.
Azelaic Acid Counseling: Patient counseled that medicine may cause skin irritation and to avoid applying near the eyes.  In the event of skin irritation, the patient was advised to reduce the amount of the drug applied or use it less frequently.   The patient verbalized understanding of the proper use and possible adverse effects of azelaic acid.  All of the patient's questions and concerns were addressed.
Colchicine Counseling:  Patient counseled regarding adverse effects including but not limited to stomach upset (nausea, vomiting, stomach pain, or diarrhea).  Patient instructed to limit alcohol consumption while taking this medication.  Colchicine may reduce blood counts especially with prolonged use.  The patient understands that monitoring of kidney function and blood counts may be required, especially at baseline. The patient verbalized understanding of the proper use and possible adverse effects of colchicine.  All of the patient's questions and concerns were addressed.
Infliximab Counseling:  I discussed with the patient the risks of infliximab including but not limited to myelosuppression, immunosuppression, autoimmune hepatitis, demyelinating diseases, lymphoma, and serious infections.  The patient understands that monitoring is required including a PPD at baseline and must alert us or the primary physician if symptoms of infection or other concerning signs are noted.
Xolair Pregnancy And Lactation Text: This medication is Pregnancy Category B and is considered safe during pregnancy. This medication is excreted in breast milk.
Wartpeel Counseling:  I discussed with the patient the risks of Wartpeel including but not limited to erythema, scaling, itching, weeping, crusting, and pain.
Opzelura Counseling:  I discussed with the patient the risks of Opzelura including but not limited to nasopharngitis, bronchitis, ear infection, eosinophila, hives, diarrhea, folliculitis, tonsillitis, and rhinorrhea.  Taken orally, this medication has been linked to serious infections; higher rate of mortality; malignancy and lymphoproliferative disorders; major adverse cardiovascular events; thrombosis; thrombocytopenia, anemia, and neutropenia; and lipid elevations.
Clindamycin Counseling: I counseled the patient regarding use of clindamycin as an antibiotic for prophylactic and/or therapeutic purposes. Clindamycin is active against numerous classes of bacteria, including skin bacteria. Side effects may include nausea, diarrhea, gastrointestinal upset, rash, hives, yeast infections, and in rare cases, colitis.
Isotretinoin Counseling: Patient should get monthly blood tests, not donate blood, not drive at night if vision affected, not share medication, and not undergo elective surgery for 6 months after tx completed. Side effects reviewed, pt to contact office should one occur.
Dutasteride Pregnancy And Lactation Text: This medication is absolutely contraindicated in women, especially during pregnancy and breast feeding. Feminization of male fetuses is possible if taking while pregnant.
Hydroxychloroquine Pregnancy And Lactation Text: This medication has been shown to cause fetal harm but it isn't assigned a Pregnancy Risk Category. There are small amounts excreted in breast milk.
Terbinafine Counseling: Patient counseling regarding adverse effects of terbinafine including but not limited to headache, diarrhea, rash, upset stomach, liver function test abnormalities, itching, taste/smell disturbance, nausea, abdominal pain, and flatulence.  There is a rare possibility of liver failure that can occur when taking terbinafine.  The patient understands that a baseline LFT and kidney function test may be required. The patient verbalized understanding of the proper use and possible adverse effects of terbinafine.  All of the patient's questions and concerns were addressed.
Imiquimod Counseling:  I discussed with the patient the risks of imiquimod including but not limited to erythema, scaling, itching, weeping, crusting, and pain.  Patient understands that the inflammatory response to imiquimod is variable from person to person and was educated regarded proper titration schedule.  If flu-like symptoms develop, patient knows to discontinue the medication and contact us.
Valtrex Pregnancy And Lactation Text: this medication is Pregnancy Category B and is considered safe during pregnancy. This medication is not directly found in breast milk but it's metabolite acyclovir is present.
Rinvoq Counseling: I discussed with the patient the risks of Rinvoq therapy including but not limited to upper respiratory tract infections, shingles, cold sores, bronchitis, nausea, cough, fever, acne, and headache. Live vaccines should be avoided.  This medication has been linked to serious infections; higher rate of mortality; malignancy and lymphoproliferative disorders; major adverse cardiovascular events; thrombosis; thrombocytopenia, anemia, and neutropenia; lipid elevations; liver enzyme elevations; and gastrointestinal perforations.
Topical Ketoconazole Counseling: Patient counseled that this medication may cause skin irritation or allergic reactions.  In the event of skin irritation, the patient was advised to reduce the amount of the drug applied or use it less frequently.   The patient verbalized understanding of the proper use and possible adverse effects of ketoconazole.  All of the patient's questions and concerns were addressed.
Oral Minoxidil Counseling- I discussed with the patient the risks of oral minoxidil including but not limited to shortness of breath, swelling of the feet or ankles, dizziness, lightheadedness, unwanted hair growth and allergic reaction.  The patient verbalized understanding of the proper use and possible adverse effects of oral minoxidil.  All of the patient's questions and concerns were addressed.
Prednisone Counseling:  I discussed with the patient the risks of prolonged use of prednisone including but not limited to weight gain, insomnia, osteoporosis, mood changes, diabetes, susceptibility to infection, glaucoma and high blood pressure.  In cases where prednisone use is prolonged, patients should be monitored with blood pressure checks, serum glucose levels and an eye exam.  Additionally, the patient may need to be placed on GI prophylaxis, PCP prophylaxis, and calcium and vitamin D supplementation and/or a bisphosphonate.  The patient verbalized understanding of the proper use and the possible adverse effects of prednisone.  All of the patient's questions and concerns were addressed.

## 2023-09-14 ENCOUNTER — APPOINTMENT (RX ONLY)
Dept: URBAN - METROPOLITAN AREA CLINIC 77 | Facility: CLINIC | Age: 48
Setting detail: DERMATOLOGY
End: 2023-09-14

## 2023-09-14 DIAGNOSIS — Z71.89 OTHER SPECIFIED COUNSELING: ICD-10-CM

## 2023-09-14 DIAGNOSIS — L71.8 OTHER ROSACEA: ICD-10-CM

## 2023-09-14 PROCEDURE — ? PRESCRIPTION

## 2023-09-14 PROCEDURE — ? TREATMENT REGIMEN

## 2023-09-14 PROCEDURE — ? LOCATION MARKER (DETAILED)

## 2023-09-14 PROCEDURE — ? COUNSELING

## 2023-09-14 PROCEDURE — 99213 OFFICE O/P EST LOW 20 MIN: CPT

## 2023-09-14 PROCEDURE — ? SUNSCREEN RECOMMENDATIONS

## 2023-09-14 PROCEDURE — ? MEDICATION COUNSELING

## 2023-09-14 RX ORDER — OXYMETAZOLINE HYDROCHLORIDE 1 G/100G
PEA-SIZED AMOUNT CREAM TOPICAL DAILY
Qty: 50 | Refills: 0 | Status: ERX | COMMUNITY
Start: 2023-09-14

## 2023-09-14 RX ORDER — CLINDAMYCIN PHOSPHATE 10 MG/ML
SOLUTION TOPICAL BID
Qty: 60 | Refills: 0 | Status: ERX

## 2023-09-14 RX ORDER — MINOCYCLINE HYDROCHLORIDE 100 MG/1
CAPSULE ORAL BID
Qty: 60 | Refills: 3 | Status: ERX

## 2023-09-14 RX ADMIN — OXYMETAZOLINE HYDROCHLORIDE PEA-SIZED AMOUNT: 1 CREAM TOPICAL at 00:00

## 2023-09-14 ASSESSMENT — LOCATION ZONE DERM: LOCATION ZONE: FACE

## 2023-09-14 ASSESSMENT — LOCATION DETAILED DESCRIPTION DERM
LOCATION DETAILED: LEFT CENTRAL MALAR CHEEK
LOCATION DETAILED: RIGHT CENTRAL MALAR CHEEK

## 2023-09-14 ASSESSMENT — LOCATION SIMPLE DESCRIPTION DERM
LOCATION SIMPLE: LEFT CHEEK
LOCATION SIMPLE: RIGHT CHEEK

## 2023-09-14 NOTE — PROCEDURE: TREATMENT REGIMEN
Initiate Treatment: Rhofade 1% cream
Continue Regimen: Clindamycin 1% solution and Minocycline 100mg
Detail Level: Zone

## 2023-09-14 NOTE — PROCEDURE: MEDICATION COUNSELING
Clindamycin Pregnancy And Lactation Text: This medication can be used in pregnancy if certain situations. Clindamycin is also present in breast milk.
Oral Minoxidil Pregnancy And Lactation Text: This medication should only be used when clearly needed if you are pregnant, attempting to become pregnant or breast feeding.
Fluconazole Counseling:  Patient counseled regarding adverse effects of fluconazole including but not limited to headache, diarrhea, nausea, upset stomach, liver function test abnormalities, taste disturbance, and stomach pain.  There is a rare possibility of liver failure that can occur when taking fluconazole.  The patient understands that monitoring of LFTs and kidney function test may be required, especially at baseline. The patient verbalized understanding of the proper use and possible adverse effects of fluconazole.  All of the patient's questions and concerns were addressed.
Finasteride Male Counseling: Finasteride Counseling:  I discussed with the patient the risks of use of finasteride including but not limited to decreased libido, decreased ejaculate volume, gynecomastia, and depression. Women should not handle medication.  All of the patient's questions and concerns were addressed.
Albendazole Counseling:  I discussed with the patient the risks of albendazole including but not limited to cytopenia, kidney damage, nausea/vomiting and severe allergy.  The patient understands that this medication is being used in an off-label manner.
Opzelura Pregnancy And Lactation Text: There is insufficient data to evaluate drug-associated risk for major birth defects, miscarriage, or other adverse maternal or fetal outcomes.  There is a pregnancy registry that monitors pregnancy outcomes in pregnant persons exposed to the medication during pregnancy.  It is unknown if this medication is excreted in breast milk.  Do not breastfeed during treatment and for about 4 weeks after the last dose.
Sotyktu Pregnancy And Lactation Text: There is insufficient data to evaluate whether or not Sotyktu is safe to use during pregnancy.? ?It is not known if Sotyktu passes into breast milk and whether or not it is safe to use when breastfeeding.??
Erivedge Counseling- I discussed with the patient the risks of Erivedge including but not limited to nausea, vomiting, diarrhea, constipation, weight loss, changes in the sense of taste, decreased appetite, muscle spasms, and hair loss.  The patient verbalized understanding of the proper use and possible adverse effects of Erivedge.  All of the patient's questions and concerns were addressed.
Bexarotene Pregnancy And Lactation Text: This medication is Pregnancy Category X and should not be given to women who are pregnant or may become pregnant. This medication should not be used if you are breast feeding.
Low Dose Naltrexone Counseling- I discussed with the patient the potential risks and side effects of low dose naltrexone including but not limited to: more vivid dreams, headaches, nausea, vomiting, abdominal pain, fatigue, dizziness, and anxiety.
Cellcept Counseling:  I discussed with the patient the risks of mycophenolate mofetil including but not limited to infection/immunosuppression, GI upset, hypokalemia, hypercholesterolemia, bone marrow suppression, lymphoproliferative disorders, malignancy, GI ulceration/bleed/perforation, colitis, interstitial lung disease, kidney failure, progressive multifocal leukoencephalopathy, and birth defects.  The patient understands that monitoring is required including a baseline creatinine and regular CBC testing. In addition, patient must alert us immediately if symptoms of infection or other concerning signs are noted.
Wartpeel Pregnancy And Lactation Text: This medication is Pregnancy Category X and contraindicated in pregnancy and in women who may become pregnant. It is unknown if this medication is excreted in breast milk.
Azelaic Acid Pregnancy And Lactation Text: This medication is considered safe during pregnancy and breast feeding.
Imiquimod Counseling:  I discussed with the patient the risks of imiquimod including but not limited to erythema, scaling, itching, weeping, crusting, and pain.  Patient understands that the inflammatory response to imiquimod is variable from person to person and was educated regarded proper titration schedule.  If flu-like symptoms develop, patient knows to discontinue the medication and contact us.
Cosentyx Counseling:  I discussed with the patient the risks of Cosentyx including but not limited to worsening of Crohn's disease, immunosuppression, allergic reactions and infections.  The patient understands that monitoring is required including a PPD at baseline and must alert us or the primary physician if symptoms of infection or other concerning signs are noted.
Infliximab Pregnancy And Lactation Text: This medication is Pregnancy Category B and is considered safe during pregnancy. It is unknown if this medication is excreted in breast milk.
Opioid Counseling: I discussed with the patient the potential side effects of opioids including but not limited to addiction, altered mental status, and depression. I stressed avoiding alcohol, benzodiazepines, muscle relaxants and sleep aids unless specifically okayed by a physician. The patient verbalized understanding of the proper use and possible adverse effects of opioids. All of the patient's questions and concerns were addressed. They were instructed to flush the remaining pills down the toilet if they did not need them for pain.
Dupixent Pregnancy And Lactation Text: This medication likely crosses the placenta but the risk for the fetus is uncertain. This medication is excreted in breast milk.
Terbinafine Pregnancy And Lactation Text: This medication is Pregnancy Category B and is considered safe during pregnancy. It is also excreted in breast milk and breast feeding isn't recommended.
Imiquimod Pregnancy And Lactation Text: This medication is Pregnancy Category C. It is unknown if this medication is excreted in breast milk.
Low Dose Naltrexone Pregnancy And Lactation Text: Naltrexone is pregnancy category C.  There have been no adequate and well-controlled studies in pregnant women.  It should be used in pregnancy only if the potential benefit justifies the potential risk to the fetus.   Limited data indicates that naltrexone is minimally excreted into breastmilk.
Drysol Counseling:  I discussed with the patient the risks of drysol/aluminum chloride including but not limited to skin rash, itching, irritation, burning.
Stelara Counseling:  I discussed with the patient the risks of ustekinumab including but not limited to immunosuppression, malignancy, posterior leukoencephalopathy syndrome, and serious infections.  The patient understands that monitoring is required including a PPD at baseline and must alert us or the primary physician if symptoms of infection or other concerning signs are noted.
Topical Clindamycin Pregnancy And Lactation Text: This medication is Pregnancy Category B and is considered safe during pregnancy. It is unknown if it is excreted in breast milk.
Prednisone Pregnancy And Lactation Text: This medication is Pregnancy Category C and it isn't know if it is safe during pregnancy. This medication is excreted in breast milk.
Thalidomide Counseling: I discussed with the patient the risks of thalidomide including but not limited to birth defects, anxiety, weakness, chest pain, dizziness, cough and severe allergy.
Solaraze Counseling:  I discussed with the patient the risks of Solaraze including but not limited to erythema, scaling, itching, weeping, crusting, and pain.
Dapsone Counseling: I discussed with the patient the risks of dapsone including but not limited to hemolytic anemia, agranulocytosis, rashes, methemoglobinemia, kidney failure, peripheral neuropathy, headaches, GI upset, and liver toxicity.  Patients who start dapsone require monitoring including baseline LFTs and weekly CBCs for the first month, then every month thereafter.  The patient verbalized understanding of the proper use and possible adverse effects of dapsone.  All of the patient's questions and concerns were addressed.
Quinolones Counseling:  I discussed with the patient the risks of fluoroquinolones including but not limited to GI upset, allergic reaction, drug rash, diarrhea, dizziness, photosensitivity, yeast infections, liver function test abnormalities, tendonitis/tendon rupture.
Isotretinoin Counseling: Patient should get monthly blood tests, not donate blood, not drive at night if vision affected, not share medication, and not undergo elective surgery for 6 months after tx completed. Side effects reviewed, pt to contact office should one occur.
Otezla Counseling: The side effects of Otezla were discussed with the patient, including but not limited to worsening or new depression, weight loss, diarrhea, nausea, upper respiratory tract infection, and headache. Patient instructed to call the office should any adverse effect occur.  The patient verbalized understanding of the proper use and possible adverse effects of Otezla.  All the patient's questions and concerns were addressed.
Fluconazole Pregnancy And Lactation Text: This medication is Pregnancy Category C and it isn't know if it is safe during pregnancy. It is also excreted in breast milk.
Erivedge Pregnancy And Lactation Text: This medication is Pregnancy Category X and is absolutely contraindicated during pregnancy. It is unknown if it is excreted in breast milk.
Finasteride Pregnancy And Lactation Text: This medication is absolutely contraindicated during pregnancy. It is unknown if it is excreted in breast milk.
Cellcept Pregnancy And Lactation Text: This medication is Pregnancy Category D and isn't considered safe during pregnancy. It is unknown if this medication is excreted in breast milk.
Xeljanz Counseling: I discussed with the patient the risks of Xeljanz therapy including increased risk of infection, liver issues, headache, diarrhea, or cold symptoms. Live vaccines should be avoided. They were instructed to call if they have any problems.
Doxycycline Counseling:  Patient counseled regarding possible photosensitivity and increased risk for sunburn.  Patient instructed to avoid sunlight, if possible.  When exposed to sunlight, patients should wear protective clothing, sunglasses, and sunscreen.  The patient was instructed to call the office immediately if the following severe adverse effects occur:  hearing changes, easy bruising/bleeding, severe headache, or vision changes.  The patient verbalized understanding of the proper use and possible adverse effects of doxycycline.  All of the patient's questions and concerns were addressed.
Winlevi Counseling:  I discussed with the patient the risks of topical clascoterone including but not limited to erythema, scaling, itching, and stinging. Patient voiced their understanding.
Rituxan Counseling:  I discussed with the patient the risks of Rituxan infusions. Side effects can include infusion reactions, severe drug rashes including mucocutaneous reactions, reactivation of latent hepatitis and other infections and rarely progressive multifocal leukoencephalopathy.  All of the patient's questions and concerns were addressed.
Benzoyl Peroxide Counseling: Patient counseled that medicine may cause skin irritation and bleach clothing.  In the event of skin irritation, the patient was advised to reduce the amount of the drug applied or use it less frequently.   The patient verbalized understanding of the proper use and possible adverse effects of benzoyl peroxide.  All of the patient's questions and concerns were addressed.
Albendazole Pregnancy And Lactation Text: This medication is Pregnancy Category C and it isn't known if it is safe during pregnancy. It is also excreted in breast milk.
Picato Counseling:  I discussed with the patient the risks of Picato including but not limited to erythema, scaling, itching, weeping, crusting, and pain.
Klisyri Counseling:  I discussed with the patient the risks of Klisyri including but not limited to erythema, scaling, itching, weeping, crusting, and pain.
Winlevi Pregnancy And Lactation Text: This medication is considered safe during pregnancy and breastfeeding.
Enbrel Counseling:  I discussed with the patient the risks of etanercept including but not limited to myelosuppression, immunosuppression, autoimmune hepatitis, demyelinating diseases, lymphoma, and infections.  The patient understands that monitoring is required including a PPD at baseline and must alert us or the primary physician if symptoms of infection or other concerning signs are noted.
Topical Ketoconazole Counseling: Patient counseled that this medication may cause skin irritation or allergic reactions.  In the event of skin irritation, the patient was advised to reduce the amount of the drug applied or use it less frequently.   The patient verbalized understanding of the proper use and possible adverse effects of ketoconazole.  All of the patient's questions and concerns were addressed.
Azithromycin Counseling:  I discussed with the patient the risks of azithromycin including but not limited to GI upset, allergic reaction, drug rash, diarrhea, and yeast infections.
Cibinqo Counseling: I discussed with the patient the risks of Cibinqo therapy including but not limited to common cold, nausea, headache, cold sores, increased blood CPK levels, dizziness, UTIs, fatigue, acne, and vomitting. Live vaccines should be avoided.  This medication has been linked to serious infections; higher rate of mortality; malignancy and lymphoproliferative disorders; major adverse cardiovascular events; thrombosis; thrombocytopenia and lymphopenia; lipid elevations; and retinal detachment.
Cyclophosphamide Counseling:  I discussed with the patient the risks of cyclophosphamide including but not limited to hair loss, hormonal abnormalities, decreased fertility, abdominal pain, diarrhea, nausea and vomiting, bone marrow suppression and infection. The patient understands that monitoring is required while taking this medication.
Niacinamide Counseling: I recommended taking niacin or niacinamide, also know as vitamin B3, twice daily. Recent evidence suggests that taking vitamin B3 (500 mg twice daily) can reduce the risk of actinic keratoses and non-melanoma skin cancers. Side effects of vitamin B3 include flushing and headache.
Solaraze Pregnancy And Lactation Text: This medication is Pregnancy Category B and is considered safe. There is some data to suggest avoiding during the third trimester. It is unknown if this medication is excreted in breast milk.
Dapsone Pregnancy And Lactation Text: This medication is Pregnancy Category C and is not considered safe during pregnancy or breast feeding.
Opioid Pregnancy And Lactation Text: These medications can lead to premature delivery and should be avoided during pregnancy. These medications are also present in breast milk in small amounts.
Libtayo Counseling- I discussed with the patient the risks of Libtayo including but not limited to nausea, vomiting, diarrhea, and bone or muscle pain.  The patient verbalized understanding of the proper use and possible adverse effects of Libtayo.  All of the patient's questions and concerns were addressed.
Elidel Counseling: Patient may experience a mild burning sensation during topical application. Elidel is not approved in children less than 2 years of age. There have been case reports of hematologic and skin malignancies in patients using topical calcineurin inhibitors although causality is questionable.
Rifampin Counseling: I discussed with the patient the risks of rifampin including but not limited to liver damage, kidney damage, red-orange body fluids, nausea/vomiting and severe allergy.
Griseofulvin Counseling:  I discussed with the patient the risks of griseofulvin including but not limited to photosensitivity, cytopenia, liver damage, nausea/vomiting and severe allergy.  The patient understands that this medication is best absorbed when taken with a fatty meal (e.g., ice cream or french fries).
Cimetidine Counseling:  I discussed with the patient the risks of Cimetidine including but not limited to gynecomastia, headache, diarrhea, nausea, drowsiness, arrhythmias, pancreatitis, skin rashes, psychosis, bone marrow suppression and kidney toxicity.
Taltz Counseling: I discussed with the patient the risks of ixekizumab including but not limited to immunosuppression, serious infections, worsening of inflammatory bowel disease and drug reactions.  The patient understands that monitoring is required including a PPD at baseline and must alert us or the primary physician if symptoms of infection or other concerning signs are noted.
Soolantra Counseling: I discussed with the patients the risks of topial Soolantra. This is a medicine which decreases the number of mites and inflammation in the skin. You experience burning, stinging, eye irritation or allergic reactions.  Please call our office if you develop any problems from using this medication.
Birth Control Pills Counseling: Birth Control Pill Counseling: I discussed with the patient the potential side effects of OCPs including but not limited to increased risk of stroke, heart attack, thrombophlebitis, deep venous thrombosis, hepatic adenomas, breast changes, GI upset, headaches, and depression.  The patient verbalized understanding of the proper use and possible adverse effects of OCPs. All of the patient's questions and concerns were addressed.
Ivermectin Counseling:  Patient instructed to take medication on an empty stomach with a full glass of water.  Patient informed of potential adverse effects including but not limited to nausea, diarrhea, dizziness, itching, and swelling of the extremities or lymph nodes.  The patient verbalized understanding of the proper use and possible adverse effects of ivermectin.  All of the patient's questions and concerns were addressed.
Isotretinoin Pregnancy And Lactation Text: This medication is Pregnancy Category X and is considered extremely dangerous during pregnancy. It is unknown if it is excreted in breast milk.
Doxycycline Pregnancy And Lactation Text: This medication is Pregnancy Category D and not consider safe during pregnancy. It is also excreted in breast milk but is considered safe for shorter treatment courses.
Rituxan Pregnancy And Lactation Text: This medication is Pregnancy Category C and it isn't know if it is safe during pregnancy. It is unknown if this medication is excreted in breast milk but similar antibodies are known to be excreted.
Benzoyl Peroxide Pregnancy And Lactation Text: This medication is Pregnancy Category C. It is unknown if benzoyl peroxide is excreted in breast milk.
Otezla Pregnancy And Lactation Text: This medication is Pregnancy Category C and it isn't known if it is safe during pregnancy. It is unknown if it is excreted in breast milk.
Carac Counseling:  I discussed with the patient the risks of Carac including but not limited to erythema, scaling, itching, weeping, crusting, and pain.
Detail Level: Simple
Cibinqo Pregnancy And Lactation Text: It is unknown if this medication will adversely affect pregnancy or breast feeding.  You should not take this medication if you are currently pregnant or planning a pregnancy or while breastfeeding.
Niacinamide Pregnancy And Lactation Text: These medications are considered safe during pregnancy.
Protopic Counseling: Patient may experience a mild burning sensation during topical application. Protopic is not approved in children less than 2 years of age. There have been case reports of hematologic and skin malignancies in patients using topical calcineurin inhibitors although causality is questionable.
Cyclophosphamide Pregnancy And Lactation Text: This medication is Pregnancy Category D and it isn't considered safe during pregnancy. This medication is excreted in breast milk.
Azithromycin Pregnancy And Lactation Text: This medication is considered safe during pregnancy and is also secreted in breast milk.
VTAMA Counseling: I discussed with the patient that VTAMA is not for use in the eyes, mouth or mouth. They should call the office if they develop any signs of allergic reactions to VTAMA. The patient verbalized understanding of the proper use and possible adverse effects of VTAMA.  All of the patient's questions and concerns were addressed.
Tranexamic Acid Counseling:  Patient advised of the small risk of bleeding problems with tranexamic acid. They were also instructed to call if they developed any nausea, vomiting or diarrhea. All of the patient's questions and concerns were addressed.
Gabapentin Counseling: I discussed with the patient the risks of gabapentin including but not limited to dizziness, somnolence, fatigue and ataxia.
Klisyri Pregnancy And Lactation Text: It is unknown if this medication can harm a developing fetus or if it is excreted in breast milk.
Soolantra Pregnancy And Lactation Text: This medication is Pregnancy Category C. This medication is considered safe during breast feeding.
Taltz Pregnancy And Lactation Text: The risk during pregnancy and breastfeeding is uncertain with this medication.
Rifampin Pregnancy And Lactation Text: This medication is Pregnancy Category C and it isn't know if it is safe during pregnancy. It is also excreted in breast milk and should not be used if you are breast feeding.
Tranexamic Acid Pregnancy And Lactation Text: It is unknown if this medication is safe during pregnancy or breast feeding.
Griseofulvin Pregnancy And Lactation Text: This medication is Pregnancy Category X and is known to cause serious birth defects. It is unknown if this medication is excreted in breast milk but breast feeding should be avoided.
Libtayo Pregnancy And Lactation Text: This medication is contraindicated in pregnancy and when breast feeding.
Arava Counseling:  Patient counseled regarding adverse effects of Arava including but not limited to nausea, vomiting, abnormalities in liver function tests. Patients may develop mouth sores, rash, diarrhea, and abnormalities in blood counts. The patient understands that monitoring is required including LFTs and blood counts.  There is a rare possibility of scarring of the liver and lung problems that can occur when taking methotrexate. Persistent nausea, loss of appetite, pale stools, dark urine, cough, and shortness of breath should be reported immediately. Patient advised to discontinue Arava treatment and consult with a physician prior to attempting conception. The patient will have to undergo a treatment to eliminate Arava from the body prior to conception.
Siliq Counseling:  I discussed with the patient the risks of Siliq including but not limited to new or worsening depression, suicidal thoughts and behavior, immunosuppression, malignancy, posterior leukoencephalopathy syndrome, and serious infections.  The patient understands that monitoring is required including a PPD at baseline and must alert us or the primary physician if symptoms of infection or other concerning signs are noted. There is also a special program designed to monitor depression which is required with Siliq.
High Dose Vitamin A Counseling: Side effects reviewed, pt to contact office should one occur.
Oxybutynin Counseling:  I discussed with the patient the risks of oxybutynin including but not limited to skin rash, drowsiness, dry mouth, difficulty urinating, and blurred vision.
Erythromycin Counseling:  I discussed with the patient the risks of erythromycin including but not limited to GI upset, allergic reaction, drug rash, diarrhea, increase in liver enzymes, and yeast infections.
Birth Control Pills Pregnancy And Lactation Text: This medication should be avoided if pregnant and for the first 30 days post-partum.
Protopic Pregnancy And Lactation Text: This medication is Pregnancy Category C. It is unknown if this medication is excreted in breast milk when applied topically.
Olumiant Counseling: I discussed with the patient the risks of Olumiant therapy including but not limited to upper respiratory tract infections, shingles, cold sores, and nausea. Live vaccines should be avoided.  This medication has been linked to serious infections; higher rate of mortality; malignancy and lymphoproliferative disorders; major adverse cardiovascular events; thrombosis; gastrointestinal perforations; neutropenia; lymphopenia; anemia; liver enzyme elevations; and lipid elevations.
Cyclosporine Counseling:  I discussed with the patient the risks of cyclosporine including but not limited to hypertension, gingival hyperplasia,myelosuppression, immunosuppression, liver damage, kidney damage, neurotoxicity, lymphoma, and serious infections. The patient understands that monitoring is required including baseline blood pressure, CBC, CMP, lipid panel and uric acid, and then 1-2 times monthly CMP and blood pressure.
Nsaids Counseling: NSAID Counseling: I discussed with the patient that NSAIDs should be taken with food. Prolonged use of NSAIDs can result in the development of stomach ulcers.  Patient advised to stop taking NSAIDs if abdominal pain occurs.  The patient verbalized understanding of the proper use and possible adverse effects of NSAIDs.  All of the patient's questions and concerns were addressed.
Spironolactone Counseling: Patient advised regarding risks of diarrhea, abdominal pain, hyperkalemia, birth defects (for female patients), liver toxicity and renal toxicity. The patient may need blood work to monitor liver and kidney function and potassium levels while on therapy. The patient verbalized understanding of the proper use and possible adverse effects of spironolactone.  All of the patient's questions and concerns were addressed.
Bactrim Counseling:  I discussed with the patient the risks of sulfa antibiotics including but not limited to GI upset, allergic reaction, drug rash, diarrhea, dizziness, photosensitivity, and yeast infections.  Rarely, more serious reactions can occur including but not limited to aplastic anemia, agranulocytosis, methemoglobinemia, blood dyscrasias, liver or kidney failure, lung infiltrates or desquamative/blistering drug rashes.
Gabapentin Pregnancy And Lactation Text: This medication is Pregnancy Category C and isn't considered safe during pregnancy. It is excreted in breast milk.
Minoxidil Counseling: Minoxidil is a topical medication which can increase blood flow where it is applied. It is uncertain how this medication increases hair growth. Side effects are uncommon and include stinging and allergic reactions.
Vtama Pregnancy And Lactation Text: It is unknown if this medication can cause problems during pregnancy and breastfeeding.
Humira Counseling:  I discussed with the patient the risks of adalimumab including but not limited to myelosuppression, immunosuppression, autoimmune hepatitis, demyelinating diseases, lymphoma, and serious infections.  The patient understands that monitoring is required including a PPD at baseline and must alert us or the primary physician if symptoms of infection or other concerning signs are noted.
Topical Metronidazole Counseling: Metronidazole is a topical antibiotic medication. You may experience burning, stinging, redness, or allergic reactions.  Please call our office if you develop any problems from using this medication.
Glycopyrrolate Counseling:  I discussed with the patient the risks of glycopyrrolate including but not limited to skin rash, drowsiness, dry mouth, difficulty urinating, and blurred vision.
Tremfya Counseling: I discussed with the patient the risks of guselkumab including but not limited to immunosuppression, serious infections, and drug reactions.  The patient understands that monitoring is required including a PPD at baseline and must alert us or the primary physician if symptoms of infection or other concerning signs are noted.
Adbry Counseling: I discussed with the patient the risks of tralokinumab including but not limited to eye infection and irritation, cold sores, injection site reactions, worsening of asthma, allergic reactions and increased risk of parasitic infection.  Live vaccines should be avoided while taking tralokinumab. The patient understands that monitoring is required and they must alert us or the primary physician if symptoms of infection or other concerning signs are noted.
Topical Steroids Applications Pregnancy And Lactation Text: Most topical steroids are considered safe to use during pregnancy and lactation.  Any topical steroid applied to the breast or nipple should be washed off before breastfeeding.
Topical Metronidazole Pregnancy And Lactation Text: This medication is Pregnancy Category B and considered safe during pregnancy.  It is also considered safe to use while breastfeeding.
Valtrex Counseling: I discussed with the patient the risks of valacyclovir including but not limited to kidney damage, nausea, vomiting and severe allergy.  The patient understands that if the infection seems to be worsening or is not improving, they are to call.
Eucrisa Counseling: Patient may experience a mild burning sensation during topical application. Eucrisa is not approved in children less than 3 months of age.
Sarecycline Counseling: Patient advised regarding possible photosensitivity and discoloration of the teeth, skin, lips, tongue and gums.  Patient instructed to avoid sunlight, if possible.  When exposed to sunlight, patients should wear protective clothing, sunglasses, and sunscreen.  The patient was instructed to call the office immediately if the following severe adverse effects occur:  hearing changes, easy bruising/bleeding, severe headache, or vision changes.  The patient verbalized understanding of the proper use and possible adverse effects of sarecycline.  All of the patient's questions and concerns were addressed.
Doxepin Counseling:  Patient advised that the medication is sedating and not to drive a car after taking this medication. Patient informed of potential adverse effects including but not limited to dry mouth, urinary retention, and blurry vision.  The patient verbalized understanding of the proper use and possible adverse effects of doxepin.  All of the patient's questions and concerns were addressed.
Include Pregnancy/Lactation Warning?: No
Itraconazole Counseling:  I discussed with the patient the risks of itraconazole including but not limited to liver damage, nausea/vomiting, neuropathy, and severe allergy.  The patient understands that this medication is best absorbed when taken with acidic beverages such as non-diet cola or ginger ale.  The patient understands that monitoring is required including baseline LFTs and repeat LFTs at intervals.  The patient understands that they are to contact us or the primary physician if concerning signs are noted.
Topical Retinoid counseling:  Patient advised to apply a pea-sized amount only at bedtime and wait 30 minutes after washing their face before applying.  If too drying, patient may add a non-comedogenic moisturizer. The patient verbalized understanding of the proper use and possible adverse effects of retinoids.  All of the patient's questions and concerns were addressed.
Xelnaldoz Pregnancy And Lactation Text: This medication is Pregnancy Category D and is not considered safe during pregnancy.  The risk during breast feeding is also uncertain.
Erythromycin Pregnancy And Lactation Text: This medication is Pregnancy Category B and is considered safe during pregnancy. It is also excreted in breast milk.
High Dose Vitamin A Pregnancy And Lactation Text: High dose vitamin A therapy is contraindicated during pregnancy and breast feeding.
Odomzo Counseling- I discussed with the patient the risks of Odomzo including but not limited to nausea, vomiting, diarrhea, constipation, weight loss, changes in the sense of taste, decreased appetite, muscle spasms, and hair loss.  The patient verbalized understanding of the proper use and possible adverse effects of Odomzo.  All of the patient's questions and concerns were addressed.
Metronidazole Counseling:  I discussed with the patient the risks of metronidazole including but not limited to seizures, nausea/vomiting, a metallic taste in the mouth, nausea/vomiting and severe allergy.
Doxepin Pregnancy And Lactation Text: This medication is Pregnancy Category C and it isn't known if it is safe during pregnancy. It is also excreted in breast milk and breast feeding isn't recommended.
Spironolactone Pregnancy And Lactation Text: This medication can cause feminization of the male fetus and should be avoided during pregnancy. The active metabolite is also found in breast milk.
Qbrexza Counseling:  I discussed with the patient the risks of Qbrexza including but not limited to headache, mydriasis, blurred vision, dry eyes, nasal dryness, dry mouth, dry throat, dry skin, urinary hesitation, and constipation.  Local skin reactions including erythema, burning, stinging, and itching can also occur.
Clofazimine Counseling:  I discussed with the patient the risks of clofazimine including but not limited to skin and eye pigmentation, liver damage, nausea/vomiting, gastrointestinal bleeding and allergy.
Simponi Counseling:  I discussed with the patient the risks of golimumab including but not limited to myelosuppression, immunosuppression, autoimmune hepatitis, demyelinating diseases, lymphoma, and serious infections.  The patient understands that monitoring is required including a PPD at baseline and must alert us or the primary physician if symptoms of infection or other concerning signs are noted.
Olumiant Pregnancy And Lactation Text: Based on animal studies, Olumiant may cause embryo-fetal harm when administered to pregnant women.  The medication should not be used in pregnancy.  Breastfeeding is not recommended during treatment.
Nsaids Pregnancy And Lactation Text: These medications are considered safe up to 30 weeks gestation. It is excreted in breast milk.
Bactrim Pregnancy And Lactation Text: This medication is Pregnancy Category D and is known to cause fetal risk.  It is also excreted in breast milk.
Calcipotriene Counseling:  I discussed with the patient the risks of calcipotriene including but not limited to erythema, scaling, itching, and irritation.
Zoryve Counseling:  I discussed with the patient that Zoryve is not for use in the eyes, mouth or vagina. The most commonly reported side effects include diarrhea, headache, insomnia, application site pain, upper respiratory tract infections, and urinary tract infections.  All of the patient's questions and concerns were addressed.
Minoxidil Pregnancy And Lactation Text: This medication has not been assigned a Pregnancy Risk Category but animal studies failed to show danger with the topical medication. It is unknown if the medication is excreted in breast milk.
Topical Sulfur Applications Counseling: Topical Sulfur Counseling: Patient counseled that this medication may cause skin irritation or allergic reactions.  In the event of skin irritation, the patient was advised to reduce the amount of the drug applied or use it less frequently.   The patient verbalized understanding of the proper use and possible adverse effects of topical sulfur application.  All of the patient's questions and concerns were addressed.
Topical Steroids Counseling: I discussed with the patient that prolonged use of topical steroids can result in the increased appearance of superficial blood vessels (telangiectasias), lightening (hypopigmentation) and thinning of the skin (atrophy).  Patient understands to avoid using high potency steroids in skin folds, the groin or the face.  The patient verbalized understanding of the proper use and possible adverse effects of topical steroids.  All of the patient's questions and concerns were addressed.
Valtrex Pregnancy And Lactation Text: this medication is Pregnancy Category B and is considered safe during pregnancy. This medication is not directly found in breast milk but it's metabolite acyclovir is present.
Sarecycline Pregnancy And Lactation Text: This medication is Pregnancy Category D and not consider safe during pregnancy. It is also excreted in breast milk.
Calcipotriene Pregnancy And Lactation Text: The use of this medication during pregnancy or lactation is not recommended as there is insufficient data.
Adbry Pregnancy And Lactation Text: It is unknown if this medication will adversely affect pregnancy or breast feeding.
Propranolol Counseling:  I discussed with the patient the risks of propranolol including but not limited to low heart rate, low blood pressure, low blood sugar, restlessness and increased cold sensitivity. They should call the office if they experience any of these side effects.
Glycopyrrolate Pregnancy And Lactation Text: This medication is Pregnancy Category B and is considered safe during pregnancy. It is unknown if it is excreted breast milk.
Propranolol Pregnancy And Lactation Text: This medication is Pregnancy Category C and it isn't known if it is safe during pregnancy. It is excreted in breast milk.
Metronidazole Pregnancy And Lactation Text: This medication is Pregnancy Category B and considered safe during pregnancy.  It is also excreted in breast milk.
Ketoconazole Counseling:   Patient counseled regarding improving absorption with orange juice.  Adverse effects include but are not limited to breast enlargement, headache, diarrhea, nausea, upset stomach, liver function test abnormalities, taste disturbance, and stomach pain.  There is a rare possibility of liver failure that can occur when taking ketoconazole. The patient understands that monitoring of LFTs may be required, especially at baseline. The patient verbalized understanding of the proper use and possible adverse effects of ketoconazole.  All of the patient's questions and concerns were addressed.
Cantharidin Counseling:  I discussed with the patient the risks of Cantharidin including but not limited to pain, redness, burning, itching, and blistering.
Qbrexza Pregnancy And Lactation Text: There is no available data on Qbrexza use in pregnant women.  There is no available data on Qbrexza use in lactation.
Acitretin Counseling:  I discussed with the patient the risks of acitretin including but not limited to hair loss, dry lips/skin/eyes, liver damage, hyperlipidemia, depression/suicidal ideation, photosensitivity.  Serious rare side effects can include but are not limited to pancreatitis, pseudotumor cerebri, bony changes, clot formation/stroke/heart attack.  Patient understands that alcohol is contraindicated since it can result in liver toxicity and significantly prolong the elimination of the drug by many years.
Ilumya Counseling: I discussed with the patient the risks of tildrakizumab including but not limited to immunosuppression, malignancy, posterior leukoencephalopathy syndrome, and serious infections.  The patient understands that monitoring is required including a PPD at baseline and must alert us or the primary physician if symptoms of infection or other concerning signs are noted.
Cephalexin Counseling: I counseled the patient regarding use of cephalexin as an antibiotic for prophylactic and/or therapeutic purposes. Cephalexin (commonly prescribed under brand name Keflex) is a cephalosporin antibiotic which is active against numerous classes of bacteria, including most skin bacteria. Side effects may include nausea, diarrhea, gastrointestinal upset, rash, hives, yeast infections, and in rare cases, hepatitis, kidney disease, seizures, fever, confusion, neurologic symptoms, and others. Patients with severe allergies to penicillin medications are cautioned that there is about a 10% incidence of cross-reactivity with cephalosporins. When possible, patients with penicillin allergies should use alternatives to cephalosporins for antibiotic therapy.
Rinvoq Counseling: I discussed with the patient the risks of Rinvoq therapy including but not limited to upper respiratory tract infections, shingles, cold sores, bronchitis, nausea, cough, fever, acne, and headache. Live vaccines should be avoided.  This medication has been linked to serious infections; higher rate of mortality; malignancy and lymphoproliferative disorders; major adverse cardiovascular events; thrombosis; thrombocytopenia, anemia, and neutropenia; lipid elevations; liver enzyme elevations; and gastrointestinal perforations.
Olanzapine Counseling- I discussed with the patient the common side effects of olanzapine including but are not limited to: lack of energy, dry mouth, increased appetite, sleepiness, tremor, constipation, dizziness, changes in behavior, or restlessness.  Explained that teenagers are more likely to experience headaches, abdominal pain, pain in the arms or legs, tiredness, and sleepiness.  Serious side effects include but are not limited: increased risk of death in elderly patients who are confused, have memory loss, or dementia-related psychosis; hyperglycemia; increased cholesterol and triglycerides; and weight gain.
Methotrexate Counseling:  Patient counseled regarding adverse effects of methotrexate including but not limited to nausea, vomiting, abnormalities in liver function tests. Patients may develop mouth sores, rash, diarrhea, and abnormalities in blood counts. The patient understands that monitoring is required including LFT's and blood counts.  There is a rare possibility of scarring of the liver and lung problems that can occur when taking methotrexate. Persistent nausea, loss of appetite, pale stools, dark urine, cough, and shortness of breath should be reported immediately. Patient advised to discontinue methotrexate treatment at least three months before attempting to become pregnant.  I discussed the need for folate supplements while taking methotrexate.  These supplements can decrease side effects during methotrexate treatment. The patient verbalized understanding of the proper use and possible adverse effects of methotrexate.  All of the patient's questions and concerns were addressed.
Mirvaso Counseling: Mirvaso is a topical medication which can decrease superficial blood flow where applied. Side effects are uncommon and include stinging, redness and allergic reactions.
Mirvaso Pregnancy And Lactation Text: This medication has not been assigned a Pregnancy Risk Category. It is unknown if the medication is excreted in breast milk.
Rinvoq Pregnancy And Lactation Text: Based on animal studies, Rinvoq may cause embryo-fetal harm when administered to pregnant women.  The medication should not be used in pregnancy.  Breastfeeding is not recommended during treatment and for 6 days after the last dose.
Olanzapine Pregnancy And Lactation Text: This medication is pregnancy category C.   There are no adequate and well controlled trials with olanzapine in pregnant females.  Olanzapine should be used during pregnancy only if the potential benefit justifies the potential risk to the fetus.   In a study in lactating healthy women, olanzapine was excreted in breast milk.  It is recommended that women taking olanzapine should not breast feed.
Aklief Pregnancy And Lactation Text: It is unknown if this medication is safe to use during pregnancy.  It is unknown if this medication is excreted in breast milk.  Breastfeeding women should use the topical cream on the smallest area of the skin for the shortest time needed while breastfeeding.  Do not apply to nipple and areola.
Dutasteride Male Counseling: Dustasteride Counseling:  I discussed with the patient the risks of use of dutasteride including but not limited to decreased libido, decreased ejaculate volume, and gynecomastia. Women who can become pregnant should not handle medication.  All of the patient's questions and concerns were addressed.
Cantharidin Pregnancy And Lactation Text: This medication has not been proven safe during pregnancy. It is unknown if this medication is excreted in breast milk.
Tetracycline Counseling: Patient counseled regarding possible photosensitivity and increased risk for sunburn.  Patient instructed to avoid sunlight, if possible.  When exposed to sunlight, patients should wear protective clothing, sunglasses, and sunscreen.  The patient was instructed to call the office immediately if the following severe adverse effects occur:  hearing changes, easy bruising/bleeding, severe headache, or vision changes.  The patient verbalized understanding of the proper use and possible adverse effects of tetracycline.  All of the patient's questions and concerns were addressed. Patient understands to avoid pregnancy while on therapy due to potential birth defects.
Cimzia Counseling:  I discussed with the patient the risks of Cimzia including but not limited to immunosuppression, allergic reactions and infections.  The patient understands that monitoring is required including a PPD at baseline and must alert us or the primary physician if symptoms of infection or other concerning signs are noted.
Hydroquinone Counseling:  Patient advised that medication may result in skin irritation, lightening (hypopigmentation), dryness, and burning.  In the event of skin irritation, the patient was advised to reduce the amount of the drug applied or use it less frequently.  Rarely, spots that are treated with hydroquinone can become darker (pseudoochronosis).  Should this occur, patient instructed to stop medication and call the office. The patient verbalized understanding of the proper use and possible adverse effects of hydroquinone.  All of the patient's questions and concerns were addressed.
Hydroxychloroquine Counseling:  I discussed with the patient that a baseline ophthalmologic exam is needed at the start of therapy and every year thereafter while on therapy. A CBC may also be warranted for monitoring.  The side effects of this medication were discussed with the patient, including but not limited to agranulocytosis, aplastic anemia, seizures, rashes, retinopathy, and liver toxicity. Patient instructed to call the office should any adverse effect occur.  The patient verbalized understanding of the proper use and possible adverse effects of Plaquenil.  All the patient's questions and concerns were addressed.
Azathioprine Counseling:  I discussed with the patient the risks of azathioprine including but not limited to myelosuppression, immunosuppression, hepatotoxicity, lymphoma, and infections.  The patient understands that monitoring is required including baseline LFTs, Creatinine, possible TPMP genotyping and weekly CBCs for the first month and then every 2 weeks thereafter.  The patient verbalized understanding of the proper use and possible adverse effects of azathioprine.  All of the patient's questions and concerns were addressed.
Hydroxyzine Counseling: Patient advised that the medication is sedating and not to drive a car after taking this medication.  Patient informed of potential adverse effects including but not limited to dry mouth, urinary retention, and blurry vision.  The patient verbalized understanding of the proper use and possible adverse effects of hydroxyzine.  All of the patient's questions and concerns were addressed.
Xolair Counseling:  Patient informed of potential adverse effects including but not limited to fever, muscle aches, rash and allergic reactions.  The patient verbalized understanding of the proper use and possible adverse effects of Xolair.  All of the patient's questions and concerns were addressed.
Topical Sulfur Applications Pregnancy And Lactation Text: This medication is considered safe during pregnancy and breast feeding secondary to limited systemic absorption.
Tazorac Counseling:  Patient advised that medication is irritating and drying.  Patient may need to apply sparingly and wash off after an hour before eventually leaving it on overnight.  The patient verbalized understanding of the proper use and possible adverse effects of tazorac.  All of the patient's questions and concerns were addressed.
Colchicine Counseling:  Patient counseled regarding adverse effects including but not limited to stomach upset (nausea, vomiting, stomach pain, or diarrhea).  Patient instructed to limit alcohol consumption while taking this medication.  Colchicine may reduce blood counts especially with prolonged use.  The patient understands that monitoring of kidney function and blood counts may be required, especially at baseline. The patient verbalized understanding of the proper use and possible adverse effects of colchicine.  All of the patient's questions and concerns were addressed.
Tazorac Pregnancy And Lactation Text: This medication is not safe during pregnancy. It is unknown if this medication is excreted in breast milk.
Skyrizi Counseling: I discussed with the patient the risks of risankizumab-rzaa including but not limited to immunosuppression, and serious infections.  The patient understands that monitoring is required including a PPD at baseline and must alert us or the primary physician if symptoms of infection or other concerning signs are noted.
Minocycline Counseling: Patient advised regarding possible photosensitivity and discoloration of the teeth, skin, lips, tongue and gums.  Patient instructed to avoid sunlight, if possible.  When exposed to sunlight, patients should wear protective clothing, sunglasses, and sunscreen.  The patient was instructed to call the office immediately if the following severe adverse effects occur:  hearing changes, easy bruising/bleeding, severe headache, or vision changes.  The patient verbalized understanding of the proper use and possible adverse effects of minocycline.  All of the patient's questions and concerns were addressed.
Xolair Pregnancy And Lactation Text: This medication is Pregnancy Category B and is considered safe during pregnancy. This medication is excreted in breast milk.
5-Fu Counseling: 5-Fluorouracil Counseling:  I discussed with the patient the risks of 5-fluorouracil including but not limited to erythema, scaling, itching, weeping, crusting, and pain.
Hydroxyzine Pregnancy And Lactation Text: This medication is not safe during pregnancy and should not be taken. It is also excreted in breast milk and breast feeding isn't recommended.
SSKI Counseling:  I discussed with the patient the risks of SSKI including but not limited to thyroid abnormalities, metallic taste, GI upset, fever, headache, acne, arthralgias, paraesthesias, lymphadenopathy, easy bleeding, arrhythmias, and allergic reaction.
Rhofade Counseling: Rhofade is a topical medication which can decrease superficial blood flow where applied. Side effects are uncommon and include stinging, redness and allergic reactions.
Zyclara Counseling:  I discussed with the patient the risks of imiquimod including but not limited to erythema, scaling, itching, weeping, crusting, and pain.  Patient understands that the inflammatory response to imiquimod is variable from person to person and was educated regarded proper titration schedule.  If flu-like symptoms develop, patient knows to discontinue the medication and contact us.
Acitretin Pregnancy And Lactation Text: This medication is Pregnancy Category X and should not be given to women who are pregnant or may become pregnant in the future. This medication is excreted in breast milk.
Cephalexin Pregnancy And Lactation Text: This medication is Pregnancy Category B and considered safe during pregnancy.  It is also excreted in breast milk but can be used safely for shorter doses.
Methotrexate Pregnancy And Lactation Text: This medication is Pregnancy Category X and is known to cause fetal harm. This medication is excreted in breast milk.
Infliximab Counseling:  I discussed with the patient the risks of infliximab including but not limited to myelosuppression, immunosuppression, autoimmune hepatitis, demyelinating diseases, lymphoma, and serious infections.  The patient understands that monitoring is required including a PPD at baseline and must alert us or the primary physician if symptoms of infection or other concerning signs are noted.
Sotyktu Counseling:  I discussed the most common side effects of Sotyktu including: common cold, sore throat, sinus infections, cold sores, canker sores, folliculitis, and acne.? I also discussed more serious side effects of Sotyktu including but not limited to: serious allergic reactions; increased risk for infections such as TB; cancers such as lymphomas; rhabdomyolysis and elevated CPK; and elevated triglycerides and liver enzymes.?
Oral Minoxidil Counseling- I discussed with the patient the risks of oral minoxidil including but not limited to shortness of breath, swelling of the feet or ankles, dizziness, lightheadedness, unwanted hair growth and allergic reaction.  The patient verbalized understanding of the proper use and possible adverse effects of oral minoxidil.  All of the patient's questions and concerns were addressed.
Prednisone Counseling:  I discussed with the patient the risks of prolonged use of prednisone including but not limited to weight gain, insomnia, osteoporosis, mood changes, diabetes, susceptibility to infection, glaucoma and high blood pressure.  In cases where prednisone use is prolonged, patients should be monitored with blood pressure checks, serum glucose levels and an eye exam.  Additionally, the patient may need to be placed on GI prophylaxis, PCP prophylaxis, and calcium and vitamin D supplementation and/or a bisphosphonate.  The patient verbalized understanding of the proper use and the possible adverse effects of prednisone.  All of the patient's questions and concerns were addressed.
Opzelura Counseling:  I discussed with the patient the risks of Opzelura including but not limited to nasopharngitis, bronchitis, ear infection, eosinophila, hives, diarrhea, folliculitis, tonsillitis, and rhinorrhea.  Taken orally, this medication has been linked to serious infections; higher rate of mortality; malignancy and lymphoproliferative disorders; major adverse cardiovascular events; thrombosis; thrombocytopenia, anemia, and neutropenia; and lipid elevations.
Dutasteride Pregnancy And Lactation Text: This medication is absolutely contraindicated in women, especially during pregnancy and breast feeding. Feminization of male fetuses is possible if taking while pregnant.
Aklief counseling:  Patient advised to apply a pea-sized amount only at bedtime and wait 30 minutes after washing their face before applying.  If too drying, patient may add a non-comedogenic moisturizer.  The most commonly reported side effects including irritation, redness, scaling, dryness, stinging, burning, itching, and increased risk of sunburn.  The patient verbalized understanding of the proper use and possible adverse effects of retinoids.  All of the patient's questions and concerns were addressed.
Clindamycin Counseling: I counseled the patient regarding use of clindamycin as an antibiotic for prophylactic and/or therapeutic purposes. Clindamycin is active against numerous classes of bacteria, including skin bacteria. Side effects may include nausea, diarrhea, gastrointestinal upset, rash, hives, yeast infections, and in rare cases, colitis.
Azelaic Acid Counseling: Patient counseled that medicine may cause skin irritation and to avoid applying near the eyes.  In the event of skin irritation, the patient was advised to reduce the amount of the drug applied or use it less frequently.   The patient verbalized understanding of the proper use and possible adverse effects of azelaic acid.  All of the patient's questions and concerns were addressed.
Hydroxychloroquine Pregnancy And Lactation Text: This medication has been shown to cause fetal harm but it isn't assigned a Pregnancy Risk Category. There are small amounts excreted in breast milk.
Ketoconazole Pregnancy And Lactation Text: This medication is Pregnancy Category C and it isn't know if it is safe during pregnancy. It is also excreted in breast milk and breast feeding isn't recommended.
Wartpeel Counseling:  I discussed with the patient the risks of Wartpeel including but not limited to erythema, scaling, itching, weeping, crusting, and pain.
Cimzia Pregnancy And Lactation Text: This medication crosses the placenta but can be considered safe in certain situations. Cimzia may be excreted in breast milk.
Topical Clindamycin Counseling: Patient counseled that this medication may cause skin irritation or allergic reactions.  In the event of skin irritation, the patient was advised to reduce the amount of the drug applied or use it less frequently.   The patient verbalized understanding of the proper use and possible adverse effects of clindamycin.  All of the patient's questions and concerns were addressed.
Dupixent Counseling: I discussed with the patient the risks of dupilumab including but not limited to eye infection and irritation, cold sores, injection site reactions, worsening of asthma, allergic reactions and increased risk of parasitic infection.  Live vaccines should be avoided while taking dupilumab. Dupilumab will also interact with certain medications such as warfarin and cyclosporine. The patient understands that monitoring is required and they must alert us or the primary physician if symptoms of infection or other concerning signs are noted.
Terbinafine Counseling: Patient counseling regarding adverse effects of terbinafine including but not limited to headache, diarrhea, rash, upset stomach, liver function test abnormalities, itching, taste/smell disturbance, nausea, abdominal pain, and flatulence.  There is a rare possibility of liver failure that can occur when taking terbinafine.  The patient understands that a baseline LFT and kidney function test may be required. The patient verbalized understanding of the proper use and possible adverse effects of terbinafine.  All of the patient's questions and concerns were addressed.
Bexarotene Counseling:  I discussed with the patient the risks of bexarotene including but not limited to hair loss, dry lips/skin/eyes, liver abnormalities, hyperlipidemia, pancreatitis, depression/suicidal ideation, photosensitivity, drug rash/allergic reactions, hypothyroidism, anemia, leukopenia, infection, cataracts, and teratogenicity.  Patient understands that they will need regular blood tests to check lipid profile, liver function tests, white blood cell count, thyroid function tests and pregnancy test if applicable.
Sski Pregnancy And Lactation Text: This medication is Pregnancy Category D and isn't considered safe during pregnancy. It is excreted in breast milk.

## 2023-10-24 ENCOUNTER — APPOINTMENT (RX ONLY)
Dept: URBAN - METROPOLITAN AREA CLINIC 77 | Facility: CLINIC | Age: 48
Setting detail: DERMATOLOGY
End: 2023-10-24

## 2023-10-24 DIAGNOSIS — L71.8 OTHER ROSACEA: ICD-10-CM

## 2023-10-24 DIAGNOSIS — Z71.89 OTHER SPECIFIED COUNSELING: ICD-10-CM

## 2023-10-24 PROCEDURE — ? SUNSCREEN RECOMMENDATIONS

## 2023-10-24 PROCEDURE — ? TREATMENT REGIMEN

## 2023-10-24 PROCEDURE — ? MEDICATION COUNSELING

## 2023-10-24 PROCEDURE — 99213 OFFICE O/P EST LOW 20 MIN: CPT

## 2023-10-24 PROCEDURE — ? COUNSELING

## 2023-10-24 PROCEDURE — ? PRESCRIPTION

## 2023-10-24 RX ORDER — MINOCYCLINE HYDROCHLORIDE 100 MG/1
CAPSULE ORAL
Qty: 30 | Refills: 0 | Status: ERX

## 2023-10-24 NOTE — PROCEDURE: MEDICATION COUNSELING
Tranexamic Acid Pregnancy And Lactation Text: It is unknown if this medication is safe during pregnancy or breast feeding.
Erythromycin Counseling:  I discussed with the patient the risks of erythromycin including but not limited to GI upset, allergic reaction, drug rash, diarrhea, increase in liver enzymes, and yeast infections.
Minoxidil Pregnancy And Lactation Text: This medication has not been assigned a Pregnancy Risk Category but animal studies failed to show danger with the topical medication. It is unknown if the medication is excreted in breast milk.
Mirvaso Counseling: Mirvaso is a topical medication which can decrease superficial blood flow where applied. Side effects are uncommon and include stinging, redness and allergic reactions.
Azathioprine Counseling:  I discussed with the patient the risks of azathioprine including but not limited to myelosuppression, immunosuppression, hepatotoxicity, lymphoma, and infections.  The patient understands that monitoring is required including baseline LFTs, Creatinine, possible TPMP genotyping and weekly CBCs for the first month and then every 2 weeks thereafter.  The patient verbalized understanding of the proper use and possible adverse effects of azathioprine.  All of the patient's questions and concerns were addressed.
Benzoyl Peroxide Counseling: Patient counseled that medicine may cause skin irritation and bleach clothing.  In the event of skin irritation, the patient was advised to reduce the amount of the drug applied or use it less frequently.   The patient verbalized understanding of the proper use and possible adverse effects of benzoyl peroxide.  All of the patient's questions and concerns were addressed.
Sarecycline Pregnancy And Lactation Text: This medication is Pregnancy Category D and not consider safe during pregnancy. It is also excreted in breast milk.
Isotretinoin Counseling: Patient should get monthly blood tests, not donate blood, not drive at night if vision affected, not share medication, and not undergo elective surgery for 6 months after tx completed. Side effects reviewed, pt to contact office should one occur.
Clindamycin Pregnancy And Lactation Text: This medication can be used in pregnancy if certain situations. Clindamycin is also present in breast milk.
Oral Minoxidil Pregnancy And Lactation Text: This medication should only be used when clearly needed if you are pregnant, attempting to become pregnant or breast feeding.
Carac Pregnancy And Lactation Text: This medication is Pregnancy Category X and contraindicated in pregnancy and in women who may become pregnant. It is unknown if this medication is excreted in breast milk.
Adbry Pregnancy And Lactation Text: It is unknown if this medication will adversely affect pregnancy or breast feeding.
Quinolones Pregnancy And Lactation Text: This medication is Pregnancy Category C and it isn't know if it is safe during pregnancy. It is also excreted in breast milk.
Klisyri Pregnancy And Lactation Text: It is unknown if this medication can harm a developing fetus or if it is excreted in breast milk.
Doxepin Counseling:  Patient advised that the medication is sedating and not to drive a car after taking this medication. Patient informed of potential adverse effects including but not limited to dry mouth, urinary retention, and blurry vision.  The patient verbalized understanding of the proper use and possible adverse effects of doxepin.  All of the patient's questions and concerns were addressed.
Cimetidine Counseling:  I discussed with the patient the risks of Cimetidine including but not limited to gynecomastia, headache, diarrhea, nausea, drowsiness, arrhythmias, pancreatitis, skin rashes, psychosis, bone marrow suppression and kidney toxicity.
Nsaids Counseling: NSAID Counseling: I discussed with the patient that NSAIDs should be taken with food. Prolonged use of NSAIDs can result in the development of stomach ulcers.  Patient advised to stop taking NSAIDs if abdominal pain occurs.  The patient verbalized understanding of the proper use and possible adverse effects of NSAIDs.  All of the patient's questions and concerns were addressed.
Stelara Counseling:  I discussed with the patient the risks of ustekinumab including but not limited to immunosuppression, malignancy, posterior leukoencephalopathy syndrome, and serious infections.  The patient understands that monitoring is required including a PPD at baseline and must alert us or the primary physician if symptoms of infection or other concerning signs are noted.
Stelara Pregnancy And Lactation Text: This medication is Pregnancy Category B and is considered safe during pregnancy. It is unknown if this medication is excreted in breast milk.
Cibinqo Pregnancy And Lactation Text: It is unknown if this medication will adversely affect pregnancy or breast feeding.  You should not take this medication if you are currently pregnant or planning a pregnancy or while breastfeeding.
Clindamycin Counseling: I counseled the patient regarding use of clindamycin as an antibiotic for prophylactic and/or therapeutic purposes. Clindamycin is active against numerous classes of bacteria, including skin bacteria. Side effects may include nausea, diarrhea, gastrointestinal upset, rash, hives, yeast infections, and in rare cases, colitis.
Xeljanz Counseling: I discussed with the patient the risks of Xeljanz therapy including increased risk of infection, liver issues, headache, diarrhea, or cold symptoms. Live vaccines should be avoided. They were instructed to call if they have any problems.
Thalidomide Counseling: I discussed with the patient the risks of thalidomide including but not limited to birth defects, anxiety, weakness, chest pain, dizziness, cough and severe allergy.
Dapsone Counseling: I discussed with the patient the risks of dapsone including but not limited to hemolytic anemia, agranulocytosis, rashes, methemoglobinemia, kidney failure, peripheral neuropathy, headaches, GI upset, and liver toxicity.  Patients who start dapsone require monitoring including baseline LFTs and weekly CBCs for the first month, then every month thereafter.  The patient verbalized understanding of the proper use and possible adverse effects of dapsone.  All of the patient's questions and concerns were addressed.
Drysol Counseling:  I discussed with the patient the risks of drysol/aluminum chloride including but not limited to skin rash, itching, irritation, burning.
Tranexamic Acid Counseling:  Patient advised of the small risk of bleeding problems with tranexamic acid. They were also instructed to call if they developed any nausea, vomiting or diarrhea. All of the patient's questions and concerns were addressed.
Ivermectin Pregnancy And Lactation Text: This medication is Pregnancy Category C and it isn't known if it is safe during pregnancy. It is also excreted in breast milk.
Topical Ketoconazole Pregnancy And Lactation Text: This medication is Pregnancy Category B and is considered safe during pregnancy. It is unknown if it is excreted in breast milk.
Prednisone Counseling:  I discussed with the patient the risks of prolonged use of prednisone including but not limited to weight gain, insomnia, osteoporosis, mood changes, diabetes, susceptibility to infection, glaucoma and high blood pressure.  In cases where prednisone use is prolonged, patients should be monitored with blood pressure checks, serum glucose levels and an eye exam.  Additionally, the patient may need to be placed on GI prophylaxis, PCP prophylaxis, and calcium and vitamin D supplementation and/or a bisphosphonate.  The patient verbalized understanding of the proper use and the possible adverse effects of prednisone.  All of the patient's questions and concerns were addressed.
Hydroxyzine Pregnancy And Lactation Text: This medication is not safe during pregnancy and should not be taken. It is also excreted in breast milk and breast feeding isn't recommended.
Glycopyrrolate Counseling:  I discussed with the patient the risks of glycopyrrolate including but not limited to skin rash, drowsiness, dry mouth, difficulty urinating, and blurred vision.
Simponi Counseling:  I discussed with the patient the risks of golimumab including but not limited to myelosuppression, immunosuppression, autoimmune hepatitis, demyelinating diseases, lymphoma, and serious infections.  The patient understands that monitoring is required including a PPD at baseline and must alert us or the primary physician if symptoms of infection or other concerning signs are noted.
Birth Control Pills Counseling: Birth Control Pill Counseling: I discussed with the patient the potential side effects of OCPs including but not limited to increased risk of stroke, heart attack, thrombophlebitis, deep venous thrombosis, hepatic adenomas, breast changes, GI upset, headaches, and depression.  The patient verbalized understanding of the proper use and possible adverse effects of OCPs. All of the patient's questions and concerns were addressed.
Quinolones Counseling:  I discussed with the patient the risks of fluoroquinolones including but not limited to GI upset, allergic reaction, drug rash, diarrhea, dizziness, photosensitivity, yeast infections, liver function test abnormalities, tendonitis/tendon rupture.
Olumiant Pregnancy And Lactation Text: Based on animal studies, Olumiant may cause embryo-fetal harm when administered to pregnant women.  The medication should not be used in pregnancy.  Breastfeeding is not recommended during treatment.
Protopic Pregnancy And Lactation Text: This medication is Pregnancy Category C. It is unknown if this medication is excreted in breast milk when applied topically.
Otezla Pregnancy And Lactation Text: This medication is Pregnancy Category C and it isn't known if it is safe during pregnancy. It is unknown if it is excreted in breast milk.
Cephalexin Pregnancy And Lactation Text: This medication is Pregnancy Category B and considered safe during pregnancy.  It is also excreted in breast milk but can be used safely for shorter doses.
Tetracycline Counseling: Patient counseled regarding possible photosensitivity and increased risk for sunburn.  Patient instructed to avoid sunlight, if possible.  When exposed to sunlight, patients should wear protective clothing, sunglasses, and sunscreen.  The patient was instructed to call the office immediately if the following severe adverse effects occur:  hearing changes, easy bruising/bleeding, severe headache, or vision changes.  The patient verbalized understanding of the proper use and possible adverse effects of tetracycline.  All of the patient's questions and concerns were addressed. Patient understands to avoid pregnancy while on therapy due to potential birth defects.
Terbinafine Pregnancy And Lactation Text: This medication is Pregnancy Category B and is considered safe during pregnancy. It is also excreted in breast milk and breast feeding isn't recommended.
Propranolol Pregnancy And Lactation Text: This medication is Pregnancy Category C and it isn't known if it is safe during pregnancy. It is excreted in breast milk.
Azithromycin Pregnancy And Lactation Text: This medication is considered safe during pregnancy and is also secreted in breast milk.
Opzelura Counseling:  I discussed with the patient the risks of Opzelura including but not limited to nasopharngitis, bronchitis, ear infection, eosinophila, hives, diarrhea, folliculitis, tonsillitis, and rhinorrhea.  Taken orally, this medication has been linked to serious infections; higher rate of mortality; malignancy and lymphoproliferative disorders; major adverse cardiovascular events; thrombosis; thrombocytopenia, anemia, and neutropenia; and lipid elevations.
Colchicine Counseling:  Patient counseled regarding adverse effects including but not limited to stomach upset (nausea, vomiting, stomach pain, or diarrhea).  Patient instructed to limit alcohol consumption while taking this medication.  Colchicine may reduce blood counts especially with prolonged use.  The patient understands that monitoring of kidney function and blood counts may be required, especially at baseline. The patient verbalized understanding of the proper use and possible adverse effects of colchicine.  All of the patient's questions and concerns were addressed.
Ilumya Pregnancy And Lactation Text: The risk during pregnancy and breastfeeding is uncertain with this medication.
Rhofade Counseling: Rhofade is a topical medication which can decrease superficial blood flow where applied. Side effects are uncommon and include stinging, redness and allergic reactions.
Ivermectin Counseling:  Patient instructed to take medication on an empty stomach with a full glass of water.  Patient informed of potential adverse effects including but not limited to nausea, diarrhea, dizziness, itching, and swelling of the extremities or lymph nodes.  The patient verbalized understanding of the proper use and possible adverse effects of ivermectin.  All of the patient's questions and concerns were addressed.
Erythromycin Pregnancy And Lactation Text: This medication is Pregnancy Category B and is considered safe during pregnancy. It is also excreted in breast milk.
Vtama Pregnancy And Lactation Text: It is unknown if this medication can cause problems during pregnancy and breastfeeding.
Topical Retinoid counseling:  Patient advised to apply a pea-sized amount only at bedtime and wait 30 minutes after washing their face before applying.  If too drying, patient may add a non-comedogenic moisturizer. The patient verbalized understanding of the proper use and possible adverse effects of retinoids.  All of the patient's questions and concerns were addressed.
Opioid Counseling: I discussed with the patient the potential side effects of opioids including but not limited to addiction, altered mental status, and depression. I stressed avoiding alcohol, benzodiazepines, muscle relaxants and sleep aids unless specifically okayed by a physician. The patient verbalized understanding of the proper use and possible adverse effects of opioids. All of the patient's questions and concerns were addressed. They were instructed to flush the remaining pills down the toilet if they did not need them for pain.
Elidel Counseling: Patient may experience a mild burning sensation during topical application. Elidel is not approved in children less than 2 years of age. There have been case reports of hematologic and skin malignancies in patients using topical calcineurin inhibitors although causality is questionable.
Rinvoq Pregnancy And Lactation Text: Based on animal studies, Rinvoq may cause embryo-fetal harm when administered to pregnant women.  The medication should not be used in pregnancy.  Breastfeeding is not recommended during treatment and for 6 days after the last dose.
Dupixent Pregnancy And Lactation Text: This medication likely crosses the placenta but the risk for the fetus is uncertain. This medication is excreted in breast milk.
Tazorac Counseling:  Patient advised that medication is irritating and drying.  Patient may need to apply sparingly and wash off after an hour before eventually leaving it on overnight.  The patient verbalized understanding of the proper use and possible adverse effects of tazorac.  All of the patient's questions and concerns were addressed.
Solaraze Counseling:  I discussed with the patient the risks of Solaraze including but not limited to erythema, scaling, itching, weeping, crusting, and pain.
Clofazimine Counseling:  I discussed with the patient the risks of clofazimine including but not limited to skin and eye pigmentation, liver damage, nausea/vomiting, gastrointestinal bleeding and allergy.
Azelaic Acid Counseling: Patient counseled that medicine may cause skin irritation and to avoid applying near the eyes.  In the event of skin irritation, the patient was advised to reduce the amount of the drug applied or use it less frequently.   The patient verbalized understanding of the proper use and possible adverse effects of azelaic acid.  All of the patient's questions and concerns were addressed.
Protopic Counseling: Patient may experience a mild burning sensation during topical application. Protopic is not approved in children less than 2 years of age. There have been case reports of hematologic and skin malignancies in patients using topical calcineurin inhibitors although causality is questionable.
Arava Pregnancy And Lactation Text: This medication is Pregnancy Category X and is absolutely contraindicated during pregnancy. It is unknown if it is excreted in breast milk.
Doxycycline Counseling:  Patient counseled regarding possible photosensitivity and increased risk for sunburn.  Patient instructed to avoid sunlight, if possible.  When exposed to sunlight, patients should wear protective clothing, sunglasses, and sunscreen.  The patient was instructed to call the office immediately if the following severe adverse effects occur:  hearing changes, easy bruising/bleeding, severe headache, or vision changes.  The patient verbalized understanding of the proper use and possible adverse effects of doxycycline.  All of the patient's questions and concerns were addressed.
Cellcept Counseling:  I discussed with the patient the risks of mycophenolate mofetil including but not limited to infection/immunosuppression, GI upset, hypokalemia, hypercholesterolemia, bone marrow suppression, lymphoproliferative disorders, malignancy, GI ulceration/bleed/perforation, colitis, interstitial lung disease, kidney failure, progressive multifocal leukoencephalopathy, and birth defects.  The patient understands that monitoring is required including a baseline creatinine and regular CBC testing. In addition, patient must alert us immediately if symptoms of infection or other concerning signs are noted.
Prednisone Pregnancy And Lactation Text: This medication is Pregnancy Category C and it isn't know if it is safe during pregnancy. This medication is excreted in breast milk.
Griseofulvin Counseling:  I discussed with the patient the risks of griseofulvin including but not limited to photosensitivity, cytopenia, liver damage, nausea/vomiting and severe allergy.  The patient understands that this medication is best absorbed when taken with a fatty meal (e.g., ice cream or french fries).
Sotyktu Pregnancy And Lactation Text: There is insufficient data to evaluate whether or not Sotyktu is safe to use during pregnancy.   It is not known if Sotyktu passes into breast milk and whether or not it is safe to use when breastfeeding.  
Valtrex Pregnancy And Lactation Text: this medication is Pregnancy Category B and is considered safe during pregnancy. This medication is not directly found in breast milk but it's metabolite acyclovir is present.
Olanzapine Pregnancy And Lactation Text: This medication is pregnancy category C.   There are no adequate and well controlled trials with olanzapine in pregnant females.  Olanzapine should be used during pregnancy only if the potential benefit justifies the potential risk to the fetus.   In a study in lactating healthy women, olanzapine was excreted in breast milk.  It is recommended that women taking olanzapine should not breast feed.
Finasteride Male Counseling: Finasteride Counseling:  I discussed with the patient the risks of use of finasteride including but not limited to decreased libido, decreased ejaculate volume, gynecomastia, and depression. Women should not handle medication.  All of the patient's questions and concerns were addressed.
Ketoconazole Counseling:   Patient counseled regarding improving absorption with orange juice.  Adverse effects include but are not limited to breast enlargement, headache, diarrhea, nausea, upset stomach, liver function test abnormalities, taste disturbance, and stomach pain.  There is a rare possibility of liver failure that can occur when taking ketoconazole. The patient understands that monitoring of LFTs may be required, especially at baseline. The patient verbalized understanding of the proper use and possible adverse effects of ketoconazole.  All of the patient's questions and concerns were addressed.
Topical Clindamycin Counseling: Patient counseled that this medication may cause skin irritation or allergic reactions.  In the event of skin irritation, the patient was advised to reduce the amount of the drug applied or use it less frequently.   The patient verbalized understanding of the proper use and possible adverse effects of clindamycin.  All of the patient's questions and concerns were addressed.
Clofazimine Pregnancy And Lactation Text: This medication is Pregnancy Category C and isn't considered safe during pregnancy. It is excreted in breast milk.
Cyclophosphamide Pregnancy And Lactation Text: This medication is Pregnancy Category D and it isn't considered safe during pregnancy. This medication is excreted in breast milk.
Ketoconazole Pregnancy And Lactation Text: This medication is Pregnancy Category C and it isn't know if it is safe during pregnancy. It is also excreted in breast milk and breast feeding isn't recommended.
Siliq Counseling:  I discussed with the patient the risks of Siliq including but not limited to new or worsening depression, suicidal thoughts and behavior, immunosuppression, malignancy, posterior leukoencephalopathy syndrome, and serious infections.  The patient understands that monitoring is required including a PPD at baseline and must alert us or the primary physician if symptoms of infection or other concerning signs are noted. There is also a special program designed to monitor depression which is required with Siliq.
Hydroxychloroquine Pregnancy And Lactation Text: This medication has been shown to cause fetal harm but it isn't assigned a Pregnancy Risk Category. There are small amounts excreted in breast milk.
Adbry Counseling: I discussed with the patient the risks of tralokinumab including but not limited to eye infection and irritation, cold sores, injection site reactions, worsening of asthma, allergic reactions and increased risk of parasitic infection.  Live vaccines should be avoided while taking tralokinumab. The patient understands that monitoring is required and they must alert us or the primary physician if symptoms of infection or other concerning signs are noted.
Niacinamide Pregnancy And Lactation Text: These medications are considered safe during pregnancy.
Doxycycline Pregnancy And Lactation Text: This medication is Pregnancy Category D and not consider safe during pregnancy. It is also excreted in breast milk but is considered safe for shorter treatment courses.
Itraconazole Counseling:  I discussed with the patient the risks of itraconazole including but not limited to liver damage, nausea/vomiting, neuropathy, and severe allergy.  The patient understands that this medication is best absorbed when taken with acidic beverages such as non-diet cola or ginger ale.  The patient understands that monitoring is required including baseline LFTs and repeat LFTs at intervals.  The patient understands that they are to contact us or the primary physician if concerning signs are noted.
High Dose Vitamin A Counseling: Side effects reviewed, pt to contact office should one occur.
Methotrexate Pregnancy And Lactation Text: This medication is Pregnancy Category X and is known to cause fetal harm. This medication is excreted in breast milk.
Xolair Pregnancy And Lactation Text: This medication is Pregnancy Category B and is considered safe during pregnancy. This medication is excreted in breast milk.
Ilumya Counseling: I discussed with the patient the risks of tildrakizumab including but not limited to immunosuppression, malignancy, posterior leukoencephalopathy syndrome, and serious infections.  The patient understands that monitoring is required including a PPD at baseline and must alert us or the primary physician if symptoms of infection or other concerning signs are noted.
Bexarotene Counseling:  I discussed with the patient the risks of bexarotene including but not limited to hair loss, dry lips/skin/eyes, liver abnormalities, hyperlipidemia, pancreatitis, depression/suicidal ideation, photosensitivity, drug rash/allergic reactions, hypothyroidism, anemia, leukopenia, infection, cataracts, and teratogenicity.  Patient understands that they will need regular blood tests to check lipid profile, liver function tests, white blood cell count, thyroid function tests and pregnancy test if applicable.
Hydroxychloroquine Counseling:  I discussed with the patient that a baseline ophthalmologic exam is needed at the start of therapy and every year thereafter while on therapy. A CBC may also be warranted for monitoring.  The side effects of this medication were discussed with the patient, including but not limited to agranulocytosis, aplastic anemia, seizures, rashes, retinopathy, and liver toxicity. Patient instructed to call the office should any adverse effect occur.  The patient verbalized understanding of the proper use and possible adverse effects of Plaquenil.  All the patient's questions and concerns were addressed.
Drysol Pregnancy And Lactation Text: This medication is considered safe during pregnancy and breast feeding.
Cyclophosphamide Counseling:  I discussed with the patient the risks of cyclophosphamide including but not limited to hair loss, hormonal abnormalities, decreased fertility, abdominal pain, diarrhea, nausea and vomiting, bone marrow suppression and infection. The patient understands that monitoring is required while taking this medication.
Winlevi Pregnancy And Lactation Text: This medication is considered safe during pregnancy and breastfeeding.
Albendazole Counseling:  I discussed with the patient the risks of albendazole including but not limited to cytopenia, kidney damage, nausea/vomiting and severe allergy.  The patient understands that this medication is being used in an off-label manner.
Rituxan Pregnancy And Lactation Text: This medication is Pregnancy Category C and it isn't know if it is safe during pregnancy. It is unknown if this medication is excreted in breast milk but similar antibodies are known to be excreted.
Spironolactone Counseling: Patient advised regarding risks of diarrhea, abdominal pain, hyperkalemia, birth defects (for female patients), liver toxicity and renal toxicity. The patient may need blood work to monitor liver and kidney function and potassium levels while on therapy. The patient verbalized understanding of the proper use and possible adverse effects of spironolactone.  All of the patient's questions and concerns were addressed.
Olanzapine Counseling- I discussed with the patient the common side effects of olanzapine including but are not limited to: lack of energy, dry mouth, increased appetite, sleepiness, tremor, constipation, dizziness, changes in behavior, or restlessness.  Explained that teenagers are more likely to experience headaches, abdominal pain, pain in the arms or legs, tiredness, and sleepiness.  Serious side effects include but are not limited: increased risk of death in elderly patients who are confused, have memory loss, or dementia-related psychosis; hyperglycemia; increased cholesterol and triglycerides; and weight gain.
Dupixent Counseling: I discussed with the patient the risks of dupilumab including but not limited to eye infection and irritation, cold sores, injection site reactions, worsening of asthma, allergic reactions and increased risk of parasitic infection.  Live vaccines should be avoided while taking dupilumab. Dupilumab will also interact with certain medications such as warfarin and cyclosporine. The patient understands that monitoring is required and they must alert us or the primary physician if symptoms of infection or other concerning signs are noted.
Xelnaldoz Pregnancy And Lactation Text: This medication is Pregnancy Category D and is not considered safe during pregnancy.  The risk during breast feeding is also uncertain.
Infliximab Counseling:  I discussed with the patient the risks of infliximab including but not limited to myelosuppression, immunosuppression, autoimmune hepatitis, demyelinating diseases, lymphoma, and serious infections.  The patient understands that monitoring is required including a PPD at baseline and must alert us or the primary physician if symptoms of infection or other concerning signs are noted.
Azathioprine Pregnancy And Lactation Text: This medication is Pregnancy Category D and isn't considered safe during pregnancy. It is unknown if this medication is excreted in breast milk.
Griseofulvin Pregnancy And Lactation Text: This medication is Pregnancy Category X and is known to cause serious birth defects. It is unknown if this medication is excreted in breast milk but breast feeding should be avoided.
Humira Counseling:  I discussed with the patient the risks of adalimumab including but not limited to myelosuppression, immunosuppression, autoimmune hepatitis, demyelinating diseases, lymphoma, and serious infections.  The patient understands that monitoring is required including a PPD at baseline and must alert us or the primary physician if symptoms of infection or other concerning signs are noted.
Minoxidil Counseling: Minoxidil is a topical medication which can increase blood flow where it is applied. It is uncertain how this medication increases hair growth. Side effects are uncommon and include stinging and allergic reactions.
Acitretin Counseling:  I discussed with the patient the risks of acitretin including but not limited to hair loss, dry lips/skin/eyes, liver damage, hyperlipidemia, depression/suicidal ideation, photosensitivity.  Serious rare side effects can include but are not limited to pancreatitis, pseudotumor cerebri, bony changes, clot formation/stroke/heart attack.  Patient understands that alcohol is contraindicated since it can result in liver toxicity and significantly prolong the elimination of the drug by many years.
VTAMA Counseling: I discussed with the patient that VTAMA is not for use in the eyes, mouth or mouth. They should call the office if they develop any signs of allergic reactions to VTAMA. The patient verbalized understanding of the proper use and possible adverse effects of VTAMA.  All of the patient's questions and concerns were addressed.
Solaraze Pregnancy And Lactation Text: This medication is Pregnancy Category B and is considered safe. There is some data to suggest avoiding during the third trimester. It is unknown if this medication is excreted in breast milk.
Benzoyl Peroxide Pregnancy And Lactation Text: This medication is Pregnancy Category C. It is unknown if benzoyl peroxide is excreted in breast milk.
Hydroxyzine Counseling: Patient advised that the medication is sedating and not to drive a car after taking this medication.  Patient informed of potential adverse effects including but not limited to dry mouth, urinary retention, and blurry vision.  The patient verbalized understanding of the proper use and possible adverse effects of hydroxyzine.  All of the patient's questions and concerns were addressed.
Olumiant Counseling: I discussed with the patient the risks of Olumiant therapy including but not limited to upper respiratory tract infections, shingles, cold sores, and nausea. Live vaccines should be avoided.  This medication has been linked to serious infections; higher rate of mortality; malignancy and lymphoproliferative disorders; major adverse cardiovascular events; thrombosis; gastrointestinal perforations; neutropenia; lymphopenia; anemia; liver enzyme elevations; and lipid elevations.
Dapsone Pregnancy And Lactation Text: This medication is Pregnancy Category C and is not considered safe during pregnancy or breast feeding.
Eucrisa Counseling: Patient may experience a mild burning sensation during topical application. Eucrisa is not approved in children less than 2 years of age.
Picato Counseling:  I discussed with the patient the risks of Picato including but not limited to erythema, scaling, itching, weeping, crusting, and pain.
Metronidazole Pregnancy And Lactation Text: This medication is Pregnancy Category B and considered safe during pregnancy.  It is also excreted in breast milk.
Doxepin Pregnancy And Lactation Text: This medication is Pregnancy Category C and it isn't known if it is safe during pregnancy. It is also excreted in breast milk and breast feeding isn't recommended.
Birth Control Pills Pregnancy And Lactation Text: This medication should be avoided if pregnant and for the first 30 days post-partum.
Sarecycline Counseling: Patient advised regarding possible photosensitivity and discoloration of the teeth, skin, lips, tongue and gums.  Patient instructed to avoid sunlight, if possible.  When exposed to sunlight, patients should wear protective clothing, sunglasses, and sunscreen.  The patient was instructed to call the office immediately if the following severe adverse effects occur:  hearing changes, easy bruising/bleeding, severe headache, or vision changes.  The patient verbalized understanding of the proper use and possible adverse effects of sarecycline.  All of the patient's questions and concerns were addressed.
Winlevi Counseling:  I discussed with the patient the risks of topical clascoterone including but not limited to erythema, scaling, itching, and stinging. Patient voiced their understanding.
Carac Counseling:  I discussed with the patient the risks of Carac including but not limited to erythema, scaling, itching, weeping, crusting, and pain.
Mirvaso Pregnancy And Lactation Text: This medication has not been assigned a Pregnancy Risk Category. It is unknown if the medication is excreted in breast milk.
Dutasteride Male Counseling: Dustasteride Counseling:  I discussed with the patient the risks of use of dutasteride including but not limited to decreased libido, decreased ejaculate volume, and gynecomastia. Women who can become pregnant should not handle medication.  All of the patient's questions and concerns were addressed.
Sski Pregnancy And Lactation Text: This medication is Pregnancy Category D and isn't considered safe during pregnancy. It is excreted in breast milk.
Qbrexza Pregnancy And Lactation Text: There is no available data on Qbrexza use in pregnant women.  There is no available data on Qbrexza use in lactation.
Finasteride Pregnancy And Lactation Text: This medication is absolutely contraindicated during pregnancy. It is unknown if it is excreted in breast milk.
Zyclara Counseling:  I discussed with the patient the risks of imiquimod including but not limited to erythema, scaling, itching, weeping, crusting, and pain.  Patient understands that the inflammatory response to imiquimod is variable from person to person and was educated regarded proper titration schedule.  If flu-like symptoms develop, patient knows to discontinue the medication and contact us.
Cephalexin Counseling: I counseled the patient regarding use of cephalexin as an antibiotic for prophylactic and/or therapeutic purposes. Cephalexin (commonly prescribed under brand name Keflex) is a cephalosporin antibiotic which is active against numerous classes of bacteria, including most skin bacteria. Side effects may include nausea, diarrhea, gastrointestinal upset, rash, hives, yeast infections, and in rare cases, hepatitis, kidney disease, seizures, fever, confusion, neurologic symptoms, and others. Patients with severe allergies to penicillin medications are cautioned that there is about a 10% incidence of cross-reactivity with cephalosporins. When possible, patients with penicillin allergies should use alternatives to cephalosporins for antibiotic therapy.
Libtayo Counseling- I discussed with the patient the risks of Libtayo including but not limited to nausea, vomiting, diarrhea, and bone or muscle pain.  The patient verbalized understanding of the proper use and possible adverse effects of Libtayo.  All of the patient's questions and concerns were addressed.
Use Enhanced Medication Counseling?: No
Erivedge Counseling- I discussed with the patient the risks of Erivedge including but not limited to nausea, vomiting, diarrhea, constipation, weight loss, changes in the sense of taste, decreased appetite, muscle spasms, and hair loss.  The patient verbalized understanding of the proper use and possible adverse effects of Erivedge.  All of the patient's questions and concerns were addressed.
Fluconazole Counseling:  Patient counseled regarding adverse effects of fluconazole including but not limited to headache, diarrhea, nausea, upset stomach, liver function test abnormalities, taste disturbance, and stomach pain.  There is a rare possibility of liver failure that can occur when taking fluconazole.  The patient understands that monitoring of LFTs and kidney function test may be required, especially at baseline. The patient verbalized understanding of the proper use and possible adverse effects of fluconazole.  All of the patient's questions and concerns were addressed.
Nsaids Pregnancy And Lactation Text: These medications are considered safe up to 30 weeks gestation. It is excreted in breast milk.
Qbrexza Counseling:  I discussed with the patient the risks of Qbrexza including but not limited to headache, mydriasis, blurred vision, dry eyes, nasal dryness, dry mouth, dry throat, dry skin, urinary hesitation, and constipation.  Local skin reactions including erythema, burning, stinging, and itching can also occur.
Minocycline Counseling: Patient advised regarding possible photosensitivity and discoloration of the teeth, skin, lips, tongue and gums.  Patient instructed to avoid sunlight, if possible.  When exposed to sunlight, patients should wear protective clothing, sunglasses, and sunscreen.  The patient was instructed to call the office immediately if the following severe adverse effects occur:  hearing changes, easy bruising/bleeding, severe headache, or vision changes.  The patient verbalized understanding of the proper use and possible adverse effects of minocycline.  All of the patient's questions and concerns were addressed.
Arava Counseling:  Patient counseled regarding adverse effects of Arava including but not limited to nausea, vomiting, abnormalities in liver function tests. Patients may develop mouth sores, rash, diarrhea, and abnormalities in blood counts. The patient understands that monitoring is required including LFTs and blood counts.  There is a rare possibility of scarring of the liver and lung problems that can occur when taking methotrexate. Persistent nausea, loss of appetite, pale stools, dark urine, cough, and shortness of breath should be reported immediately. Patient advised to discontinue Arava treatment and consult with a physician prior to attempting conception. The patient will have to undergo a treatment to eliminate Arava from the body prior to conception.
High Dose Vitamin A Pregnancy And Lactation Text: High dose vitamin A therapy is contraindicated during pregnancy and breast feeding.
Oxybutynin Counseling:  I discussed with the patient the risks of oxybutynin including but not limited to skin rash, drowsiness, dry mouth, difficulty urinating, and blurred vision.
Detail Level: Detailed
Rituxan Counseling:  I discussed with the patient the risks of Rituxan infusions. Side effects can include infusion reactions, severe drug rashes including mucocutaneous reactions, reactivation of latent hepatitis and other infections and rarely progressive multifocal leukoencephalopathy.  All of the patient's questions and concerns were addressed.
Skyrizi Counseling: I discussed with the patient the risks of risankizumab-rzaa including but not limited to immunosuppression, and serious infections.  The patient understands that monitoring is required including a PPD at baseline and must alert us or the primary physician if symptoms of infection or other concerning signs are noted.
Topical Ketoconazole Counseling: Patient counseled that this medication may cause skin irritation or allergic reactions.  In the event of skin irritation, the patient was advised to reduce the amount of the drug applied or use it less frequently.   The patient verbalized understanding of the proper use and possible adverse effects of ketoconazole.  All of the patient's questions and concerns were addressed.
Aklief counseling:  Patient advised to apply a pea-sized amount only at bedtime and wait 30 minutes after washing their face before applying.  If too drying, patient may add a non-comedogenic moisturizer.  The most commonly reported side effects including irritation, redness, scaling, dryness, stinging, burning, itching, and increased risk of sunburn.  The patient verbalized understanding of the proper use and possible adverse effects of retinoids.  All of the patient's questions and concerns were addressed.
Gabapentin Counseling: I discussed with the patient the risks of gabapentin including but not limited to dizziness, somnolence, fatigue and ataxia.
Dutasteride Pregnancy And Lactation Text: This medication is absolutely contraindicated in women, especially during pregnancy and breast feeding. Feminization of male fetuses is possible if taking while pregnant.
Topical Sulfur Applications Pregnancy And Lactation Text: This medication is Pregnancy Category C and has an unknown safety profile during pregnancy. It is unknown if this topical medication is excreted in breast milk.
Opioid Pregnancy And Lactation Text: These medications can lead to premature delivery and should be avoided during pregnancy. These medications are also present in breast milk in small amounts.
Bactrim Pregnancy And Lactation Text: This medication is Pregnancy Category D and is known to cause fetal risk.  It is also excreted in breast milk.
Glycopyrrolate Pregnancy And Lactation Text: This medication is Pregnancy Category B and is considered safe during pregnancy. It is unknown if it is excreted breast milk.
Spironolactone Pregnancy And Lactation Text: This medication can cause feminization of the male fetus and should be avoided during pregnancy. The active metabolite is also found in breast milk.
Zyclara Pregnancy And Lactation Text: This medication is Pregnancy Category C. It is unknown if this medication is excreted in breast milk.
Hydroquinone Counseling:  Patient advised that medication may result in skin irritation, lightening (hypopigmentation), dryness, and burning.  In the event of skin irritation, the patient was advised to reduce the amount of the drug applied or use it less frequently.  Rarely, spots that are treated with hydroquinone can become darker (pseudoochronosis).  Should this occur, patient instructed to stop medication and call the office. The patient verbalized understanding of the proper use and possible adverse effects of hydroquinone.  All of the patient's questions and concerns were addressed.
Rinvoq Counseling: I discussed with the patient the risks of Rinvoq therapy including but not limited to upper respiratory tract infections, shingles, cold sores, bronchitis, nausea, cough, fever, acne, and headache. Live vaccines should be avoided.  This medication has been linked to serious infections; higher rate of mortality; malignancy and lymphoproliferative disorders; major adverse cardiovascular events; thrombosis; thrombocytopenia, anemia, and neutropenia; lipid elevations; liver enzyme elevations; and gastrointestinal perforations.
Azithromycin Counseling:  I discussed with the patient the risks of azithromycin including but not limited to GI upset, allergic reaction, drug rash, diarrhea, and yeast infections.
Bexarotene Pregnancy And Lactation Text: This medication is Pregnancy Category X and should not be given to women who are pregnant or may become pregnant. This medication should not be used if you are breast feeding.
Propranolol Counseling:  I discussed with the patient the risks of propranolol including but not limited to low heart rate, low blood pressure, low blood sugar, restlessness and increased cold sensitivity. They should call the office if they experience any of these side effects.
Imiquimod Counseling:  I discussed with the patient the risks of imiquimod including but not limited to erythema, scaling, itching, weeping, crusting, and pain.  Patient understands that the inflammatory response to imiquimod is variable from person to person and was educated regarded proper titration schedule.  If flu-like symptoms develop, patient knows to discontinue the medication and contact us.
Terbinafine Counseling: Patient counseling regarding adverse effects of terbinafine including but not limited to headache, diarrhea, rash, upset stomach, liver function test abnormalities, itching, taste/smell disturbance, nausea, abdominal pain, and flatulence.  There is a rare possibility of liver failure that can occur when taking terbinafine.  The patient understands that a baseline LFT and kidney function test may be required. The patient verbalized understanding of the proper use and possible adverse effects of terbinafine.  All of the patient's questions and concerns were addressed.
Tremfya Counseling: I discussed with the patient the risks of guselkumab including but not limited to immunosuppression, serious infections, worsening of inflammatory bowel disease and drug reactions.  The patient understands that monitoring is required including a PPD at baseline and must alert us or the primary physician if symptoms of infection or other concerning signs are noted.
Acitretin Pregnancy And Lactation Text: This medication is Pregnancy Category X and should not be given to women who are pregnant or may become pregnant in the future. This medication is excreted in breast milk.
Cimzia Counseling:  I discussed with the patient the risks of Cimzia including but not limited to immunosuppression, allergic reactions and infections.  The patient understands that monitoring is required including a PPD at baseline and must alert us or the primary physician if symptoms of infection or other concerning signs are noted.
Libtayo Pregnancy And Lactation Text: This medication is contraindicated in pregnancy and when breast feeding.
Klisyri Counseling:  I discussed with the patient the risks of Klisyri including but not limited to erythema, scaling, itching, weeping, crusting, and pain.
Odomzo Counseling- I discussed with the patient the risks of Odomzo including but not limited to nausea, vomiting, diarrhea, constipation, weight loss, changes in the sense of taste, decreased appetite, muscle spasms, and hair loss.  The patient verbalized understanding of the proper use and possible adverse effects of Odomzo.  All of the patient's questions and concerns were addressed.
Cosentyx Counseling:  I discussed with the patient the risks of Cosentyx including but not limited to worsening of Crohn's disease, immunosuppression, allergic reactions and infections.  The patient understands that monitoring is required including a PPD at baseline and must alert us or the primary physician if symptoms of infection or other concerning signs are noted.
Bactrim Counseling:  I discussed with the patient the risks of sulfa antibiotics including but not limited to GI upset, allergic reaction, drug rash, diarrhea, dizziness, photosensitivity, and yeast infections.  Rarely, more serious reactions can occur including but not limited to aplastic anemia, agranulocytosis, methemoglobinemia, blood dyscrasias, liver or kidney failure, lung infiltrates or desquamative/blistering drug rashes.
Rifampin Counseling: I discussed with the patient the risks of rifampin including but not limited to liver damage, kidney damage, red-orange body fluids, nausea/vomiting and severe allergy.
Otezla Counseling: The side effects of Otezla were discussed with the patient, including but not limited to worsening or new depression, weight loss, diarrhea, nausea, upper respiratory tract infection, and headache. Patient instructed to call the office should any adverse effect occur.  The patient verbalized understanding of the proper use and possible adverse effects of Otezla.  All the patient's questions and concerns were addressed.
Valtrex Counseling: I discussed with the patient the risks of valacyclovir including but not limited to kidney damage, nausea, vomiting and severe allergy.  The patient understands that if the infection seems to be worsening or is not improving, they are to call.
Aklief Pregnancy And Lactation Text: It is unknown if this medication is safe to use during pregnancy.  It is unknown if this medication is excreted in breast milk.  Breastfeeding women should use the topical cream on the smallest area of the skin for the shortest time needed while breastfeeding.  Do not apply to nipple and areola.
Cibinqo Counseling: I discussed with the patient the risks of Cibinqo therapy including but not limited to common cold, nausea, headache, cold sores, increased blood CPK levels, dizziness, UTIs, fatigue, acne, and vomitting. Live vaccines should be avoided.  This medication has been linked to serious infections; higher rate of mortality; malignancy and lymphoproliferative disorders; major adverse cardiovascular events; thrombosis; thrombocytopenia and lymphopenia; lipid elevations; and retinal detachment.
Oral Minoxidil Counseling- I discussed with the patient the risks of oral minoxidil including but not limited to shortness of breath, swelling of the feet or ankles, dizziness, lightheadedness, unwanted hair growth and allergic reaction.  The patient verbalized understanding of the proper use and possible adverse effects of oral minoxidil.  All of the patient's questions and concerns were addressed.
Isotretinoin Pregnancy And Lactation Text: This medication is Pregnancy Category X and is considered extremely dangerous during pregnancy. It is unknown if it is excreted in breast milk.
5-Fu Counseling: 5-Fluorouracil Counseling:  I discussed with the patient the risks of 5-fluorouracil including but not limited to erythema, scaling, itching, weeping, crusting, and pain.
Cimzia Pregnancy And Lactation Text: This medication crosses the placenta but can be considered safe in certain situations. Cimzia may be excreted in breast milk.
Low Dose Naltrexone Counseling- I discussed with the patient the potential risks and side effects of low dose naltrexone including but not limited to: more vivid dreams, headaches, nausea, vomiting, abdominal pain, fatigue, dizziness, and anxiety.
Enbrel Counseling:  I discussed with the patient the risks of etanercept including but not limited to myelosuppression, immunosuppression, autoimmune hepatitis, demyelinating diseases, lymphoma, and infections.  The patient understands that monitoring is required including a PPD at baseline and must alert us or the primary physician if symptoms of infection or other concerning signs are noted.
Low Dose Naltrexone Pregnancy And Lactation Text: Naltrexone is pregnancy category C.  There have been no adequate and well-controlled studies in pregnant women.  It should be used in pregnancy only if the potential benefit justifies the potential risk to the fetus.   Limited data indicates that naltrexone is minimally excreted into breastmilk.
Niacinamide Counseling: I recommended taking niacin or niacinamide, also know as vitamin B3, twice daily. Recent evidence suggests that taking vitamin B3 (500 mg twice daily) can reduce the risk of actinic keratoses and non-melanoma skin cancers. Side effects of vitamin B3 include flushing and headache.
Wartpeel Counseling:  I discussed with the patient the risks of Wartpeel including but not limited to erythema, scaling, itching, weeping, crusting, and pain.
SSKI Counseling:  I discussed with the patient the risks of SSKI including but not limited to thyroid abnormalities, metallic taste, GI upset, fever, headache, acne, arthralgias, paraesthesias, lymphadenopathy, easy bleeding, arrhythmias, and allergic reaction.
Sotyktu Counseling:  I discussed the most common side effects of Sotyktu including: common cold, sore throat, sinus infections, cold sores, canker sores, folliculitis, and acne.  I also discussed more serious side effects of Sotyktu including but not limited to: serious allergic reactions; increased risk for infections such as TB; cancers such as lymphomas; rhabdomyolysis and elevated CPK; and elevated triglycerides and liver enzymes. 
Taltz Counseling: I discussed with the patient the risks of ixekizumab including but not limited to immunosuppression, serious infections, worsening of inflammatory bowel disease and drug reactions.  The patient understands that monitoring is required including a PPD at baseline and must alert us or the primary physician if symptoms of infection or other concerning signs are noted.
Topical Sulfur Applications Counseling: Topical Sulfur Counseling: Patient counseled that this medication may cause skin irritation or allergic reactions.  In the event of skin irritation, the patient was advised to reduce the amount of the drug applied or use it less frequently.   The patient verbalized understanding of the proper use and possible adverse effects of topical sulfur application.  All of the patient's questions and concerns were addressed.
Cyclosporine Counseling:  I discussed with the patient the risks of cyclosporine including but not limited to hypertension, gingival hyperplasia,myelosuppression, immunosuppression, liver damage, kidney damage, neurotoxicity, lymphoma, and serious infections. The patient understands that monitoring is required including baseline blood pressure, CBC, CMP, lipid panel and uric acid, and then 1-2 times monthly CMP and blood pressure.
Tazorac Pregnancy And Lactation Text: This medication is not safe during pregnancy. It is unknown if this medication is excreted in breast milk.
Metronidazole Counseling:  I discussed with the patient the risks of metronidazole including but not limited to seizures, nausea/vomiting, a metallic taste in the mouth, nausea/vomiting and severe allergy.
Opzelura Pregnancy And Lactation Text: There is insufficient data to evaluate drug-associated risk for major birth defects, miscarriage, or other adverse maternal or fetal outcomes.  There is a pregnancy registry that monitors pregnancy outcomes in pregnant persons exposed to the medication during pregnancy.  It is unknown if this medication is excreted in breast milk.  Do not breastfeed during treatment and for about 4 weeks after the last dose.
Xolair Counseling:  Patient informed of potential adverse effects including but not limited to fever, muscle aches, rash and allergic reactions.  The patient verbalized understanding of the proper use and possible adverse effects of Xolair.  All of the patient's questions and concerns were addressed.
Methotrexate Counseling:  Patient counseled regarding adverse effects of methotrexate including but not limited to nausea, vomiting, abnormalities in liver function tests. Patients may develop mouth sores, rash, diarrhea, and abnormalities in blood counts. The patient understands that monitoring is required including LFT's and blood counts.  There is a rare possibility of scarring of the liver and lung problems that can occur when taking methotrexate. Persistent nausea, loss of appetite, pale stools, dark urine, cough, and shortness of breath should be reported immediately. Patient advised to discontinue methotrexate treatment at least three months before attempting to become pregnant.  I discussed the need for folate supplements while taking methotrexate.  These supplements can decrease side effects during methotrexate treatment. The patient verbalized understanding of the proper use and possible adverse effects of methotrexate.  All of the patient's questions and concerns were addressed.
Rifampin Pregnancy And Lactation Text: This medication is Pregnancy Category C and it isn't know if it is safe during pregnancy. It is also excreted in breast milk and should not be used if you are breast feeding.
Calcipotriene Counseling:  I discussed with the patient the risks of calcipotriene including but not limited to erythema, scaling, itching, and irritation.
Topical Metronidazole Counseling: Metronidazole is a topical antibiotic medication. You may experience burning, stinging, redness, or allergic reactions.  Please call our office if you develop any problems from using this medication.
Calcipotriene Pregnancy And Lactation Text: The use of this medication during pregnancy or lactation is not recommended as there is insufficient data.
Cantharidin Pregnancy And Lactation Text: This medication has not been proven safe during pregnancy. It is unknown if this medication is excreted in breast milk.
Litfulo Counseling: I discussed with the patient the risks of Litfulo therapy including but not limited to upper respiratory tract infections, shingles, cold sores, and nausea. Live vaccines should be avoided.  This medication has been linked to serious infections; higher rate of mortality; malignancy and lymphoproliferative disorders; major adverse cardiovascular events; thrombosis; gastrointestinal perforations; neutropenia; lymphopenia; anemia; liver enzyme elevations; and lipid elevations.
Zoryve Counseling:  I discussed with the patient that Zoryve is not for use in the eyes, mouth or vagina. The most commonly reported side effects include diarrhea, headache, insomnia, application site pain, upper respiratory tract infections, and urinary tract infections.  All of the patient's questions and concerns were addressed.
Cantharidin Counseling:  I discussed with the patient the risks of Cantharidin including but not limited to pain, redness, burning, itching, and blistering.
Topical Metronidazole Pregnancy And Lactation Text: This medication is Pregnancy Category B and considered safe during pregnancy.  It is also considered safe to use while breastfeeding.
Litfulo Pregnancy And Lactation Text: Based on animal studies, Lifulo may cause embryo-fetal harm when administered to pregnant women.  The medication should not be used in pregnancy.  Breastfeeding is not recommended during treatment.
Soolantra Counseling: I discussed with the patients the risks of topial Soolantra. This is a medicine which decreases the number of mites and inflammation in the skin. You experience burning, stinging, eye irritation or allergic reactions.  Please call our office if you develop any problems from using this medication.
Topical Steroids Counseling: I discussed with the patient that prolonged use of topical steroids can result in the increased appearance of superficial blood vessels (telangiectasias), lightening (hypopigmentation) and thinning of the skin (atrophy).  Patient understands to avoid using high potency steroids in skin folds, the groin or the face.  The patient verbalized understanding of the proper use and possible adverse effects of topical steroids.  All of the patient's questions and concerns were addressed.
Soolantra Pregnancy And Lactation Text: This medication is Pregnancy Category C. This medication is considered safe during breast feeding.
Topical Steroids Applications Pregnancy And Lactation Text: Most topical steroids are considered safe to use during pregnancy and lactation.  Any topical steroid applied to the breast or nipple should be washed off before breastfeeding.

## 2024-12-06 ENCOUNTER — HOSPITAL ENCOUNTER (OUTPATIENT)
Dept: HOSPITAL 104 - SWHD | Age: 49
Discharge: HOME | End: 2024-12-06
Payer: MEDICAID
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
## 2024-12-06 DIAGNOSIS — K60.30: ICD-10-CM

## 2024-12-06 DIAGNOSIS — D64.9: ICD-10-CM

## 2024-12-06 DIAGNOSIS — L98.412: ICD-10-CM

## 2024-12-06 DIAGNOSIS — K50.90: ICD-10-CM

## 2024-12-06 DIAGNOSIS — L92.9: ICD-10-CM

## 2024-12-06 DIAGNOSIS — S31.819D: Primary | ICD-10-CM

## 2024-12-06 DIAGNOSIS — N63.0: ICD-10-CM

## 2024-12-06 PROCEDURE — 99213 OFFICE O/P EST LOW 20 MIN: CPT

## 2024-12-06 PROCEDURE — A9270 NON-COVERED ITEM OR SERVICE: HCPCS

## 2024-12-06 PROCEDURE — G0463 HOSPITAL OUTPT CLINIC VISIT: HCPCS

## 2024-12-21 ENCOUNTER — HOSPITAL ENCOUNTER (EMERGENCY)
Age: 49
Discharge: HOME | End: 2024-12-21
Payer: MEDICAID

## 2024-12-21 VITALS
SYSTOLIC BLOOD PRESSURE: 118 MMHG | OXYGEN SATURATION: 99 % | RESPIRATION RATE: 18 BRPM | TEMPERATURE: 97.7 F | HEART RATE: 69 BPM | DIASTOLIC BLOOD PRESSURE: 77 MMHG

## 2024-12-21 VITALS
TEMPERATURE: 98 F | OXYGEN SATURATION: 100 % | SYSTOLIC BLOOD PRESSURE: 115 MMHG | RESPIRATION RATE: 18 BRPM | DIASTOLIC BLOOD PRESSURE: 78 MMHG | HEART RATE: 87 BPM

## 2024-12-21 DIAGNOSIS — K58.2: ICD-10-CM

## 2024-12-21 DIAGNOSIS — K60.30: Primary | ICD-10-CM

## 2024-12-21 LAB
ALANINE AMINOTRANSFERASE: 25 U/L (ref 10–49)
ALBUMIN, SERUM: 4.4 GM/DL (ref 3.5–5)
ALBUMIN/GLOBULIN RATIO: 1.5 (ref 1.2–2.2)
ALKALINE PHOSPHATASE: 92 U/L (ref 46–116)
ANION GAP: 8 (ref 7–16)
ASPARTATE AMINO TRANSFERASE: 14 U/L (ref 0–34)
BASOPHILS # (AUTO): 0 THOU/MM3 (ref 0–0.2)
BASOPHILS % (AUTO): 0 % (ref 0–2.5)
BILIRUBIN,TOTAL: 0.5 MG/DL (ref 0.3–1.2)
BLOOD UREA NITROGEN: 12 MG/DL (ref 9–23)
BUN/CREATININE RATIO: 17 RATIO (ref 12–20)
CALCIUM (CORRECTED): 9.6 MG/DL (ref 8.5–10.1)
CALCIUM OXALATE CRYSTALS,URINE: (no result)
CALCIUM: 9.6 MG/DL (ref 8.3–10.6)
CARBON DIOXIDE: 28.7 MMOL/L (ref 20–31)
CHLORIDE: 99 MMOL/L (ref 98–107)
CREAT CL PREDICTED SERPL C-G-VRATE: 105.5 ML/MIN (ref 60–?)
CREATININE (COMPONENT): 0.7 MG/DL (ref 0.6–1.3)
EGFR: > 60 SEE NOTE
EOSINOPHILS # (AUTO): 0.1 THOU/MM3 (ref 0–0.5)
EOSINOPHILS % (AUTO): 1 % (ref 0–10)
GLOBULIN: 3 GM/DL (ref 2.3–3.5)
GLUCOSE: 137 MG/DL (ref 74–106)
HEMATOCRIT: 37.5 % (ref 41–53)
HEMOGLOBIN: 11.9 G/DL (ref 13.5–16)
IMM GRANULOCYTES # BLD AUTO: 0.02 THOU/MM3 (ref 0–0)
IMMATURE GRANULOCYTES % (AUTO): 0 % (ref 0–0)
LIPASE: 34 U/L (ref 12–53)
LYMPHOCYTES # (AUTO): 2 THOU/MM3 (ref 1–4.8)
LYMPHOCYTES % (AUTO): 29 % (ref 10–50)
MEAN CORPUSCULAR HEMOGLOBIN: 22.7 PG (ref 25–35)
MEAN CORPUSCULAR HGB CONC: 31.7 G/DL (ref 31–37)
MEAN CORPUSCULAR VOLUME: 72 FL (ref 80–100)
MONOCYTES # (AUTO): 0.5 THOU/MM3 (ref 0–0.8)
MONOCYTES % (AUTO): 7 % (ref 0–12)
NEUTROPHILS # (AUTO): 4.4 THOU/MM3 (ref 1.8–7.7)
NEUTROPHILS % (AUTO): 63 % (ref 37–80)
NRBC BLD-RTO: 0 /100 WBC
NUCLEATED RED BLOOD CELL #: 0 THOU/MM3 (ref 0–0)
OSMOLALITY,CALCULATED: 273 (ref 275–295)
PH,URINE: 6.5 (ref 5–7)
PLATELET COUNT: 391 THOU/MM3 (ref 140–440)
POTASSIUM: 3 MMOL/L (ref 3.4–5.1)
PROTEIN,URINE: (no result)
RBC,URINE: 4 /HPF (ref 0–3)
RDW STANDARD DEVIATION: 39.3 FL (ref 35.1–43.9)
RED BLOOD COUNT: 5.24 MILN/MM3 (ref 4.5–5.9)
SODIUM: 136 MMOL/L (ref 136–145)
SPECIFIC GRAVITY,URINE: 1.03 (ref 1–1.03)
SQUAMOUS EPITHELIAL CELL,URINE: 0 /HPF (ref 0–5)
TOTAL PROTEIN: 7.4 GM/DL (ref 5.7–8.2)
WBC,URINE: 6 /HPF (ref 0–5)
WHITE BLOOD COUNT: 7.1 THOU/MM3 (ref 3.8–10.6)

## 2024-12-21 PROCEDURE — 80053 COMPREHEN METABOLIC PANEL: CPT

## 2024-12-21 PROCEDURE — A9270 NON-COVERED ITEM OR SERVICE: HCPCS

## 2024-12-21 PROCEDURE — 85025 COMPLETE CBC W/AUTO DIFF WBC: CPT

## 2024-12-21 PROCEDURE — 74176 CT ABD & PELVIS W/O CONTRAST: CPT

## 2024-12-21 PROCEDURE — 99284 EMERGENCY DEPT VISIT MOD MDM: CPT

## 2024-12-21 PROCEDURE — 36415 COLL VENOUS BLD VENIPUNCTURE: CPT

## 2024-12-21 PROCEDURE — 81001 URINALYSIS AUTO W/SCOPE: CPT

## 2024-12-21 PROCEDURE — 83690 ASSAY OF LIPASE: CPT

## 2024-12-21 NOTE — PD.EDRME
Rapid Medical Screening Exam
RME
Arrival date/time: 
12/21/24 11:26
49-year-old male with history of Crohn's disease presents emergency department complains of lower abdominal pain and diarrhea
Chief Complaint: Abdominal Pain
Time Seen by Provider: 12/21/24 11:45
Vital signs: 

Vital Signs

Temperature  98.0 F   12/21/24 11:32
Pulse Rate  87   12/21/24 11:32
Respiratory Rate  18   12/21/24 11:32
Blood Pressure  115/78   12/21/24 11:32
Pulse Oximetry (%)  100   12/21/24 11:32
Oxygen Delivery Method  Room Air   12/21/24 11:32

## 2024-12-21 NOTE — PD.EDABDPN
ED Abdominal Pain RME/HPI
General
Chief Complaint: Abdominal Pain
Stated complaint: ABD PAIN/NAUSEA/CONSTIPATION X1 MONTH
Time seen by provider: 12/21/24 11:45
Arrival date/time: 
12/21/24 11:26

RME / HPI
RME / HPI narrative: 


49-year-old male patient with significant history of Crohn's disease, came in for evaluation regarding lower abdominal pain and diarrhea.  This been ongoing for the last 1 month, it comes and goes.  Patient also complaining of chronic wound to the 
rectal area for several months.  Seen by colorectal surgeon, Dr. Ritchie.  Patient denies any vomiting denies any fever denies any other complaints told me that he is currently taking prednisone and mesalamine.
Related Data
Home Medications

?Medication ?Instructions ?Recorded ?Confirmed
SALOFALK 500 mg PO 08/01/24 09/25/24
diphenoxylate-atropine 2.5 See Rx Instructions .Route 08/21/24 09/25/24
mg-0.025 mg tablet .COMPLEX PRN Diarrhea  
mesalamine 500 mg capsule,extended 1,000 mg PO BID 08/28/24 09/25/24
release (Pentasa)   

Previous Rx's

?Medication ?Instructions ?Recorded
ibuprofen 600 mg tablet 600 mg PO Q6H PRN pain #30 tabs 01/11/24
ciprofloxacin HCl 500 mg tablet 500 mg PO BID #14 tabs 12/21/24
(Cipro)  
metronidazole 500 mg tablet 500 mg PO BID 7 days #14 tabs 12/21/24


Allergies

Allergy/AdvReac Type Severity Reaction Status Date / Time
sulfamethizole Allergy Mild Rash Verified 12/21/24 11:30



Review of Systems
Review of Systems
Narrative Review of Systems: 
Review of system reviewed and within normal limits except mentioned in HPI

ED Exam
Narrative
Physical exam: 
VITAL SIGNS: Reviewed.
GENERAL APPEARANCE: Alert and  interactive, follows commands, no acute distress, 
HEAD AND FACE: Non-traumatic.
ENT: PERRL, pink conjunctivitis, eyelid no trauma, Mucous membrane moist.
NECK: Supple, nontender, no nuchal rigidity.
CHEST: No tenderness, no crepitus, no paradoxical movement, no retractions.
LUNGS: Clear, well ventilated, symmetric, no rales, no wheezing, no ronchi, no stridor, good breath sounds bilaterally.
HEART: Regular rate, regular rhythm, no murmur, no gallops.
ABDOMEN: Soft, positive bowel sounds, nondistended, no guarding, nontender, no rebound, no masses,
RECTAL: Chronic wound noted on the rectal area, at 2 o'clock position, no redness noted nonfluctuant ,nontender
GENITAL: Deferred.
NEUROLOGICAL: Gross motor function intact sensory function intact, Appropriate for age.
MUSCULOSKELETAL: low back nontender, full range of motion.
EXTREMITIES: Nontender, full range of motion. 
SKIN: Color pink, dry, no rash, no lacerations, no abrasions, no contusions.
LYMPHATICS: Deferred.

Course
Quality Measures
none
Orders



 Category Date Time Status
 CT abdomen pelvis wo con Stat Exams  12/21/24 11:47 Completed
 CBC Stat Lab  12/21/24 11:55 Completed
 Comprehensive Metabolic Panel Stat Lab  12/21/24 11:55 Completed
 Lipase Stat Lab  12/21/24 11:55 Completed
 UA, C/S IF [Urinalysis, C/S if Indicated] Stat Lab  12/21/24 14:56 Completed
 Metoclopramide [Reglan] Med  12/21/24 11:47 Discontinued
 10 mg PO X1 ONE   
 Potassium Chloride [K-Dur] Med  12/21/24 15:57 Once
 40 meq PO X1 ONE   



Vital Signs
Vital signs: 

Vital Signs

Temperature  98.0 F   12/21/24 11:32
Pulse Rate  87   12/21/24 11:32
Respiratory Rate  18   12/21/24 11:32
Blood Pressure  115/78   12/21/24 11:32
Pulse Oximetry (%)  100   12/21/24 11:32
Oxygen Delivery Method  Room Air   12/21/24 11:32




Abdominal Pain MDM
MDM Narrative
MDM Narrative:: 
49-year-old male patient with significant history of Crohn's disease, came in for evaluation regarding lower abdominal pain and diarrhea.  This been ongoing for the last 1 month, it comes and goes.  Patient also complaining of chronic wound to the 
rectal area for several months.  Seen by colorectal surgeon, Dr. Ritchie.  Patient denies any vomiting denies any fever denies any other complaints told me that he is currently taking prednisone and mesalamine.

Laboratory workup all came back unremarkable except for potassium of 3.0.  No leukocytosis noted.

CT scan of the abdomen and pelvis showed Diffuse  severe nonspecific colitis pattern
 
Fistulous tract from the posterior right perianal region extending to the right
intergluteal fold with cellulitis


Patient received potassium replacement in the emergency room.  Patient was advised to see Dr. Medina again next week.  Patient will be prescribed home on Flagyl and Cipro
Patient data
External records reviewed:: None
Clinical information provided by:: patient
Social determinants that could affect healthcare access:: none
Patient has the following chronic illnesses:: 
Course this is
How is presenting disease/condition affected by chronic disease/condition?: exacerbated by
Evaluation data
The following diagnostics were reviewed and interpreted by me:: lab results and radiology exam(s)
Lab and/or radiology exams considered but not ordered:: 
None
Interpretation Summary: 
See above in the MDM
Medications / Prescriptions
Medications or Prescriptions considered but not ordered:: 
None
Medication administrations:: 

Medication Administration History


Discontinued Medications

Metoclopramide HCl (Metoclopramide 5 Mg Tablet)  10 mg PO X1 ONE
 Stop: 12/21/24 11:48
 Last Admin: 12/21/24 12:04  Dose: 10 mg
 Documented By: OA


Reglan and potassium replacement
Consultations
Consultation(s) initiated? (list below): No
Diagnosis
Differential diagnosis abdominal pain: abdominal pain, constipation, gastroenteritis and other (Crohn's disease, chronic fistula of the perianal)
Most likely diagnosis given after review of the tests above:: 
Crohn's disease, chronic perianal fistula
Admission Indicated
Admission indicated?: not indicated
Admission Request
Was there a request for admission?: No
Disposition Plan
Disposition Plan: Discharge
Discharge Attestation
Discharge Attestation: 
The patient  was given an opportunity to ask questions and understood the discharge instructions.  Discharge instructions specifically effects, indications for sooner follow up or return to the emergency department, and the expected course of 
current diagnosis.  Patient condition:  Stable

Discharge Plan
Plan
Patient Disposition: HOME (Self Care)

Disposition Comment: Stable

Prescriptions/Referrals
Prescriptions/Med Rec:
New
  ciprofloxacin HCl [Cipro] 500 mg tablet 
   500 mg PO BID Qty: 14 0RF
  metronidazole 500 mg tablet 
   500 mg PO BID 7 Days Qty: 14 0RF

No Action
  ibuprofen 400 mg tablet 
   400 mg PO ONCE Qty: 1 0RF
  ibuprofen 600 mg tablet 
   600 mg PO ONCE Qty: 1 0RF
  ibuprofen 600 mg tablet 
   600 mg PO Q6H PRN (Reason: pain) Qty: 30 0RF
  SALOFALK   
   500 mg PO  
  mesalamine [Pentasa] 500 mg capsule, extended release 
   1,000 mg PO BID 
  diphenoxylate-atropine 2.5-0.025 mg tablet 
   See Rx Instructions  .ROUTE .COMPLEX PRN (Reason: Diarrhea) 
   Patient Comments:
   TAKE 1-2 TABLETS BY MOUTH EVERY 8 HOURS AS NEEDED DIARRHEA MAX 8 TABS/24 HRS
   Rx Instructions:
   Take 1-2 tablets by mouth every 8 hours as needed for diarrhea

Referrals:
Trent Daniels MD [Primary Care Provider] - In 1 week

Problem List
Clinical Impression:
 Perianal fistula, Irritable bowel disease


Patient/Caregiver Discharge Instructions
Discharge Activity: activity as tolerated

Education Materials:  ED Irritable Bowel Syndrome

Additional Instructions:
Thank you for the opportunity for serving you today. You are stable for discharged .  
You are advised to:
Follow-up with Dr. Ritchie next week
Return to ED for worsening of symptoms
Increase oral fluids
Take medication as prescribed
Increase fiber in the diet

Print Language: Argentine

Stand Alone Forms:  June Award Info., Patient Portal Info Letter

PA/TORRIEP Supervising Physician
PA/BRENNA Supervising Physician: MD Reba

## 2024-12-21 NOTE — XR_ITS
Examination:  
  
CT abdomen and pelvis without contrast. 
Coronal 3-D reconstructions. 
Sagittal 2-D reconstructions. 
  
Date and time of exam:December 21, 2024 1231 hrs. 
  
Indications: Generalized abdominal pain one month, perianal fistula history  
  
CTDI: vol (mGy): 5.1 
DLP: (mGycm): 279 
  
  
  
Technique: 
Axial images of the abdomen have been obtained, 3 mm slice thickness 
Intravenous contrast material has not been administered. 
  
Low dose protocols were performed.  One or more of the following dose reduction 
techniques were used; 
automated exposure control, adjustment of the mA and/or KV according to patient 
size, use of  
iterative reconstruction technique. 
  
Findings: 
  
No focal liver or splenic lesions 
Contracted gallbladder 
No pancreatic or adrenal mass 
No renal or ureteral calculi 
Diffuse wall thickening involving the common 
No pericecal inflammatory change 
Contracted urinary bladder 
Fistulous tract from the posterior right perianal region axial image 218 
extending to the intergluteal fold with cellulitis in the right gluteal region, 
no definite abscess 
  
Impression: 
  
Diffuse  severe nonspecific colitis pattern 
  
Fistulous tract from the posterior right perianal region extending to the right 
intergluteal fold with cellulitis

## 2024-12-21 NOTE — EDNOTE_ITS
ED Abdominal Pain RME/HPI    
General    
Chief Complaint: Abdominal Pain    
Stated complaint: ABD PAIN/NAUSEA/CONSTIPATION X1 MONTH    
Time seen by provider: 12/21/24 11:45    
Arrival date/time:     
12/21/24 11:26    
    
RME / HPI    
RME / HPI narrative:     
    
    
49-year-old male patient with significant history of Crohn's disease, came in   
for evaluation regarding lower abdominal pain and diarrhea.  This been ongoing   
for the last 1 month, it comes and goes.  Patient also complaining of chronic   
wound to the rectal area for several months.  Seen by colorectal surgeon, Dr. Ritchie.  Patient denies any vomiting denies any fever denies any other complaints  
told me that he is currently taking prednisone and mesalamine.    
Related Data    
                                Home Medications    
    
    
    
?Medication ?Instructions ?Recorded ?Confirmed    
     
SALOFALK 500 mg PO 08/01/24 09/25/24    
     
diphenoxylate-atropine 2.5 See Rx Instructions .Route 08/21/24 09/25/24    
    
mg-0.025 mg tablet .COMPLEX PRN Diarrhea      
     
mesalamine 500 mg capsule,extended 1,000 mg PO BID 08/28/24 09/25/24    
    
release (Pentasa)       
    
    
                                  Previous Rx's    
    
    
    
?Medication ?Instructions ?Recorded    
     
ibuprofen 600 mg tablet 600 mg PO Q6H PRN pain #30 tabs 01/11/24    
     
ciprofloxacin HCl 500 mg tablet 500 mg PO BID #14 tabs 12/21/24    
    
(Cipro)      
     
metronidazole 500 mg tablet 500 mg PO BID 7 days #14 tabs 12/21/24    
    
    
    
                                    Allergies    
    
    
    
Allergy/AdvReac Type Severity Reaction Status Date / Time    
     
sulfamethizole Allergy Mild Rash Verified 12/21/24 11:30    
    
    
    
    
Review of Systems    
Review of Systems    
Narrative Review of Systems:     
Review of system reviewed and within normal limits except mentioned in HPI    
    
ED Exam    
Narrative    
Physical exam:     
VITAL SIGNS: Reviewed.    
GENERAL APPEARANCE: Alert and  interactive, follows commands, no acute distress,  
    
HEAD AND FACE: Non-traumatic.    
ENT: PERRL, pink conjunctivitis, eyelid no trauma, Mucous membrane moist.    
NECK: Supple, nontender, no nuchal rigidity.    
CHEST: No tenderness, no crepitus, no paradoxical movement, no retractions.    
LUNGS: Clear, well ventilated, symmetric, no rales, no wheezing, no ronchi, no   
stridor, good breath sounds bilaterally.    
HEART: Regular rate, regular rhythm, no murmur, no gallops.    
ABDOMEN: Soft, positive bowel sounds, nondistended, no guarding, nontender, no   
rebound, no masses,    
RECTAL: Chronic wound noted on the rectal area, at 2 o'clock position, no   
redness noted nonfluctuant ,nontender    
GENITAL: Deferred.    
NEUROLOGICAL: Gross motor function intact sensory function intact, Appropriate   
for age.    
MUSCULOSKELETAL: low back nontender, full range of motion.    
EXTREMITIES: Nontender, full range of motion.     
SKIN: Color pink, dry, no rash, no lacerations, no abrasions, no contusions.    
LYMPHATICS: Deferred.    
    
Course    
Quality Measures    
none    
Orders    
    
                                            
    
    
    
 Category Date Time Status    
     
 CT abdomen pelvis wo con Stat Exams  12/21/24 11:47 Completed    
     
 CBC Stat Lab  12/21/24 11:55 Completed    
     
 Comprehensive Metabolic Panel Stat Lab  12/21/24 11:55 Completed    
     
 Lipase Stat Lab  12/21/24 11:55 Completed    
     
 UA, C/S IF [Urinalysis, C/S if Indicated] Stat Lab  12/21/24 14:56 Completed    
     
 Metoclopramide [Reglan] Med  12/21/24 11:47 Discontinued    
    
 10 mg PO X1 ONE       
     
 Potassium Chloride [K-Dur] Med  12/21/24 15:57 Once    
    
 40 meq PO X1 ONE       
    
    
    
    
Vital Signs    
Vital signs:     
    
                                   Vital Signs    
    
    
    
Temperature  98.0 F   12/21/24 11:32    
     
Pulse Rate  87   12/21/24 11:32    
     
Respiratory Rate  18   12/21/24 11:32    
     
Blood Pressure  115/78   12/21/24 11:32    
     
Pulse Oximetry (%)  100   12/21/24 11:32    
     
Oxygen Delivery Method  Room Air   12/21/24 11:32    
    
    
    
    
    
Abdominal Pain MDM    
MDM Narrative    
MDM Narrative::     
49-year-old male patient with significant history of Crohn's disease, came in   
for evaluation regarding lower abdominal pain and diarrhea.  This been ongoing   
for the last 1 month, it comes and goes.  Patient also complaining of chronic   
wound to the rectal area for several months.  Seen by colorectal surgeon, Dr. Ritchie.  Patient denies any vomiting denies any fever denies any other complaints  
told me that he is currently taking prednisone and mesalamine.    
    
Laboratory workup all came back unremarkable except for potassium of 3.0.  No le  
ukocytosis noted.    
    
CT scan of the abdomen and pelvis showed Diffuse  severe nonspecific colitis   
pattern    
     
Fistulous tract from the posterior right perianal region extending to the right    
intergluteal fold with cellulitis    
    
    
Patient received potassium replacement in the emergency room.  Patient was   
advised to see Dr. Medina again next week.  Patient will be prescribed home on   
Flagyl and Cipro    
Patient data    
External records reviewed:: None    
Clinical information provided by:: patient    
Social determinants that could affect healthcare access:: none    
Patient has the following chronic illnesses::     
Course this is    
How is presenting disease/condition affected by chronic disease/condition?:   
exacerbated by    
Evaluation data    
The following diagnostics were reviewed and interpreted by me:: lab results and   
radiology exam(s)    
Lab and/or radiology exams considered but not ordered::     
None    
Interpretation Summary:     
See above in the MDM    
Medications / Prescriptions    
Medications or Prescriptions considered but not ordered::     
None    
Medication administrations::     
    
                        Medication Administration History    
    
    
Discontinued Medications    
    
Metoclopramide HCl (Metoclopramide 5 Mg Tablet)  10 mg PO X1 ONE    
   Stop: 12/21/24 11:48    
   Last Admin: 12/21/24 12:04  Dose: 10 mg    
   Documented By: OA    
    
    
Reglan and potassium replacement    
Consultations    
Consultation(s) initiated? (list below): No    
Diagnosis    
Differential diagnosis abdominal pain: abdominal pain, constipation,   
gastroenteritis and other (Crohn's disease, chronic fistula of the perianal)    
Most likely diagnosis given after review of the tests above::     
Crohn's disease, chronic perianal fistula    
Admission Indicated    
Admission indicated?: not indicated    
Admission Request    
Was there a request for admission?: No    
Disposition Plan    
Disposition Plan: Discharge    
Discharge Attestation    
Discharge Attestation:     
The patient  was given an opportunity to ask questions and understood the   
discharge instructions.  Discharge instructions specifically effects,   
indications for sooner follow up or return to the emergency department, and the   
expected course of current diagnosis.  Patient condition:  Stable    
    
Discharge Plan    
Plan    
Patient Disposition: HOME (Self Care)    
    
Disposition Comment: Stable    
    
Prescriptions/Referrals    
Prescriptions/Med Rec:    
New    
  ciprofloxacin HCl [Cipro] 500 mg tablet     
   500 mg PO BID Qty: 14 0RF    
  metronidazole 500 mg tablet     
   500 mg PO BID 7 Days Qty: 14 0RF    
    
No Action    
  ibuprofen 400 mg tablet     
   400 mg PO ONCE Qty: 1 0RF    
  ibuprofen 600 mg tablet     
   600 mg PO ONCE Qty: 1 0RF    
  ibuprofen 600 mg tablet     
   600 mg PO Q6H PRN (Reason: pain) Qty: 30 0RF    
  SALOFALK       
   500 mg PO      
  mesalamine [Pentasa] 500 mg capsule, extended release     
   1,000 mg PO BID     
  diphenoxylate-atropine 2.5-0.025 mg tablet     
   See Rx Instructions  .ROUTE .COMPLEX PRN (Reason: Diarrhea)     
   Patient Comments:    
   TAKE 1-2 TABLETS BY MOUTH EVERY 8 HOURS AS NEEDED DIARRHEA MAX 8 TABS/24 HRS    
   Rx Instructions:    
   Take 1-2 tablets by mouth every 8 hours as needed for diarrhea    
    
Referrals:    
Trent Daniels MD [Primary Care Provider] - In 1 week    
    
Problem List    
Clinical Impression:    
 Perianal fistula, Irritable bowel disease    
    
    
Patient/Caregiver Discharge Instructions    
Discharge Activity: activity as tolerated    
    
Education Materials:  ED Irritable Bowel Syndrome    
    
Additional Instructions:    
Thank you for the opportunity for serving you today. You are stable for   
discharged .      
You are advised to:    
Follow-up with Dr. Ritchie next week    
Return to ED for worsening of symptoms    
Increase oral fluids    
Take medication as prescribed    
Increase fiber in the diet    
    
Print Language: Ukrainian    
    
Stand Alone Forms:  June Award Info., Patient Portal Info Letter    
    
PA/TORRIEP Supervising Physician    
PA/BRENNA Supervising Physician: MD Reba

## 2025-01-06 ENCOUNTER — HOSPITAL ENCOUNTER (OUTPATIENT)
Dept: HOSPITAL 104 - SWHD | Age: 50
Discharge: HOME | End: 2025-01-06
Payer: MEDICAID

## 2025-01-06 DIAGNOSIS — K50.90: ICD-10-CM

## 2025-01-06 DIAGNOSIS — K60.30: ICD-10-CM

## 2025-01-06 DIAGNOSIS — N63.0: ICD-10-CM

## 2025-01-06 DIAGNOSIS — D64.9: ICD-10-CM
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
## 2025-01-06 DIAGNOSIS — S31.819D: Primary | ICD-10-CM

## 2025-01-06 DIAGNOSIS — L98.412: ICD-10-CM

## 2025-01-06 DIAGNOSIS — L92.9: ICD-10-CM

## 2025-01-06 PROCEDURE — G0463 HOSPITAL OUTPT CLINIC VISIT: HCPCS

## 2025-01-06 PROCEDURE — 99213 OFFICE O/P EST LOW 20 MIN: CPT

## 2025-01-06 PROCEDURE — A9270 NON-COVERED ITEM OR SERVICE: HCPCS

## 2025-01-13 ENCOUNTER — HOSPITAL ENCOUNTER
Dept: HOSPITAL 104 - HODSRG | Age: 50
Discharge: HOME | End: 2025-01-13
Payer: MEDICAID

## 2025-01-13 VITALS
RESPIRATION RATE: 18 BRPM | OXYGEN SATURATION: 98 % | HEART RATE: 74 BPM | TEMPERATURE: 98.78 F | SYSTOLIC BLOOD PRESSURE: 111 MMHG | DIASTOLIC BLOOD PRESSURE: 74 MMHG

## 2025-01-13 DIAGNOSIS — K50.113: Primary | ICD-10-CM

## 2025-01-13 PROCEDURE — 99213 OFFICE O/P EST LOW 20 MIN: CPT

## 2025-01-13 PROCEDURE — G0463 HOSPITAL OUTPT CLINIC VISIT: HCPCS

## 2025-01-13 NOTE — PD.GSCLVISIT
Vital Signs - [facility]
 01/13/25
14:58
Height 1.68 m
Height Method Stated
Weight 60.044 kg
Weight Measurement Method Standing Scale
BMI 21.2
/74
Blood Pressure Source Manual Cuff- Auscultation
Blood Pressure Location Left Upper Arm
Position Sitting
Respiration 18
Pulse 74
Pulse Source Monitor
Temp 98.8 F
Temp Source Temporal Artery Scan
Pulse Oximetry (%) 98
Oxygen Delivery Method Room Air

Med/Allergies
Allergies & Medications
Allergies

sulfamethizole Allergy (Mild, Verified 01/13/25 14:59)
 Rash


Medication Reconciliation

ibuprofen 600 mg tablet 600 mg PO Q6H PRN pain #30 tabs 01/11/24 [Rx Confirmed 01/13/25]
SALOFALK 500 mg PO 08/01/24 [History Confirmed 01/13/25]
diphenoxylate-atropine 2.5 mg-0.025 mg tablet See Rx Instructions .Route .COMPLEX PRN Diarrhea 08/21/24 [History Confirmed 01/13/25]
mesalamine 500 mg capsule,extended release (Pentasa) 1,000 mg PO BID 08/28/24 [History Confirmed 01/13/25]
ciprofloxacin HCl 500 mg tablet (Cipro) 500 mg PO BID #14 tabs 12/21/24 [Rx Confirmed 01/13/25]



MA Intake
Visit Data Collection
New Patient or Established: Established Patient (seen at [facility] within 3 years)
Seen by Clinical Staff ONLY (RN/MA): No
Reason for Visit:: 
FISTULA F/U
Pain Present Currently: Yes
Pain Location: Unable to identify
Pain scale:: 6
 Required: Yes
PCP or OBGYN visit in last 3 months: Yes
Hx Pregnant Now: No
Do You Feel Safe at Home: Yes
Authorities Contacted: N/A
Smoking Status
Smoking Status: Never smoker
Immunization / Flu
Flu Vaccine in the Last 12 Months: No
Flu Vaccine Exclusion Criteria: Refused by Patient

Past Medical History
Past Medical History
NEUROLOGIC: Negative Seizures
CARDIAC: Negative Cardiac Disorders or Congestive Heart Failure
RESPIRATORY: Negative Chronic Obstructive Pulmonary Disease (COPD) or Asthma
GENITOURINARY: Negative Renal Disease
ENDOCRINE: Negative Diabetes Mellitus Type 1 or Diabetes Mellitus Type 2
HEMATOLOGIC: Negative Sickle Cell Disease
OTHER HISTORY: Negative Blood Transfusions, Blood Transfusion Reaction or Anesthesia Reactions
Social History
SMOKING STATUS: Smoking status: Never smoker
ALCOHOL: Alcohol Intake: Never
HOUSING: Housing: trailer
LIVES WITH: Lives With: Alone

HPI
HPI Narrative
Spoke to pt with in-person 

49M with Crohn's who initially presented with a perianal abscess and associated fistula s/p EUA with seton placement 12/2024, seton removed 8/20/2024 here for planned follow-up.  Patient has been taking Humira and mesalamine and is being weaned from 
prednisone, and states that for the last month or so his diarrhea has improved somewhat.  He still has loose BMs and tenesmus but feels that he is not going quite as frequently as of late.  He is followed with wound care, continues to have perianal 
drainage although it is less than previous.  Patient also states he is eating better and has gained a little bit of weight

ROS
Review of Systems
Systems Reviewed: All systems reviewed, normal except as documented

Objective/Exam
General
General Appearance: alert, cooperative and well groomed
Resp
Respiratory exam: Absent respiratory distress
Rectal
Rectal exam: Present other (Right perianal wound healing with beefy red granulation tissue, no obvious fistula tract, no surrounding erythema, no fluctuance)

Assessment & Plan
Diagnosis / Problem List
(1) Crohn's disease of perianal region with fistula: 
      Status: Acute
      Assessment & Plan: 
49M with Crohn's disease and associated perianal fistula which has decreased in size compared to exams last year.  Patient was advised to follow-up with his GI, PCP and wound care
      Plan: 
Follow up in 3 months or sooner if needed top

Office Procedures
GNS Level of Care
Nursing/Assessment
Patient Status: Established Patient
Nursing Assessment/Reassesment: Medication Reconciliation, Update PMH in EMR and Vital Signs
Coordination of Care: Complex Care and Chronic Disease 1-5, Consent,records obtained, informed consent, Education Simp Pt/Fam, Results/Orders obtained and Staff clarify orders
Special Needs: Language special needs
Established Patient Charge
Established Patient Point Assignment: 90
Established Patient Point Charge: EP Level 3 ()


Patient Portal Questionaires
Social History
Living Situation History
Housing: trailer
Tobacco History
Smoking Status: Never smoker
Alcohol History
Alcohol Intake: Never
Domestic Abuse History
Do You Feel Safe at Home: Yes

Review of Systems
Report any current symptoms
Only answer those that you have currently: 

Past Medical History
Past Medical History
Have you ever been diagnosed with any of the following: 
Neurological Problems
Seizures: No
Cardiology Problems
Congestive Heart Failure: No
Respiratory Problems
Chronic Obstructive Pulmonary Disease (COPD): No
Asthma: No
Genital/Urinary Problems
Renal Disease: No
Endocrine Problems
Diabetes Mellitus Type 1: No
Diabetes Mellitus Type 2: No
Blood Problems
Sickle Cell Disease: No
Other Problems
Blood Transfusions: No
Blood Transfusion Reaction: No
Anesthesia Reactions: No

## 2025-01-13 NOTE — GSCOFFNT_ITS
Vital Signs - [facility]    
    
    
                                    01/13/25    
                                      14:58    
     
Height                               1.68 m    
     
Height Method                        Stated    
     
Weight                              60.044 kg    
     
Weight Measurement Method        Standing Scale    
     
BMI                                   21.2    
     
BP                                   111/74    
     
Blood Pressure Source       Manual Cuff- Auscultation    
     
Blood Pressure Location          Left Upper Arm    
     
Position                             Sitting    
     
Respiration                            18    
     
Pulse                                  74    
     
Pulse Source                         Monitor    
     
Temp                                 98.8 F    
     
Temp Source                   Temporal Artery Scan    
     
Pulse Oximetry (%)                     98    
     
Oxygen Delivery Method              Room Air    
    
    
Med/Allergies    
Allergies & Medications    
Allergies    
    
sulfamethizole Allergy (Mild, Verified 01/13/25 14:59)    
   Rash    
    
    
Medication Reconciliation    
    
ibuprofen 600 mg tablet 600 mg PO Q6H PRN pain #30 tabs 01/11/24 [Rx Confirmed   
01/13/25]    
SALOFALK 500 mg PO 08/01/24 [History Confirmed 01/13/25]    
diphenoxylate-atropine 2.5 mg-0.025 mg tablet See Rx Instructions .Route   
.COMPLEX PRN Diarrhea 08/21/24 [History Confirmed 01/13/25]    
mesalamine 500 mg capsule,extended release (Pentasa) 1,000 mg PO BID 08/28/24   
[History Confirmed 01/13/25]    
ciprofloxacin HCl 500 mg tablet (Cipro) 500 mg PO BID #14 tabs 12/21/24 [Rx   
Confirmed 01/13/25]    
    
    
    
MA Intake    
Visit Data Collection    
New Patient or Established: Established Patient (seen at [facility] within 3 years)    
Seen by Clinical Staff ONLY (RN/MA): No    
Reason for Visit::     
FISTULA F/U    
Pain Present Currently: Yes    
Pain Location: Unable to identify    
Pain scale:: 6    
 Required: Yes    
PCP or OBGYN visit in last 3 months: Yes    
Hx Pregnant Now: No    
Do You Feel Safe at Home: Yes    
Authorities Contacted: N/A    
Smoking Status    
Smoking Status: Never smoker    
Immunization / Flu    
Flu Vaccine in the Last 12 Months: No    
Flu Vaccine Exclusion Criteria: Refused by Patient    
    
Past Medical History    
Past Medical History    
NEUROLOGIC: Negative Seizures    
CARDIAC: Negative Cardiac Disorders or Congestive Heart Failure    
RESPIRATORY: Negative Chronic Obstructive Pulmonary Disease (COPD) or Asthma    
GENITOURINARY: Negative Renal Disease    
ENDOCRINE: Negative Diabetes Mellitus Type 1 or Diabetes Mellitus Type 2    
HEMATOLOGIC: Negative Sickle Cell Disease    
OTHER HISTORY: Negative Blood Transfusions, Blood Transfusion Reaction or   
Anesthesia Reactions    
Social History    
SMOKING STATUS: Smoking status: Never smoker    
ALCOHOL: Alcohol Intake: Never    
HOUSING: Housing: trailer    
LIVES WITH: Lives With: Alone    
    
HPI    
HPI Narrative    
Spoke to pt with in-person     
    
49M with Crohn's who initially presented with a perianal abscess and associated   
fistula s/p EUA with seton placement 12/2024, seton removed 8/20/2024 here for   
planned follow-up.  Patient has been taking Humira and mesalamine and is being   
weaned from prednisone, and states that for the last month or so his diarrhea   
has improved somewhat.  He still has loose BMs and tenesmus but feels that he is  
not going quite as frequently as of late.  He is followed with wound care,   
continues to have perianal drainage although it is less than previous.  Patient   
also states he is eating better and has gained a little bit of weight    
    
ROS    
Review of Systems    
Systems Reviewed: All systems reviewed, normal except as documented    
    
Objective/Exam    
General    
General Appearance: alert, cooperative and well groomed    
Resp    
Respiratory exam: Absent respiratory distress    
Rectal    
Rectal exam: Present other (Right perianal wound healing with beefy red   
granulation tissue, no obvious fistula tract, no surrounding erythema, no   
fluctuance)    
    
Assessment & Plan    
Diagnosis / Problem List    
(1) Crohn's disease of perianal region with fistula:     
      Status: Acute    
      Assessment & Plan:     
49M with Crohn's disease and associated perianal fistula which has decreased in   
size compared to exams last year.  Patient was advised to follow-up with his GI,  
PCP and wound care    
      Plan:     
Follow up in 3 months or sooner if needed top    
    
Office Procedures    
GNS Level of Care    
Nursing/Assessment    
Patient Status: Established Patient    
Nursing Assessment/Reassesment: Medication Reconciliation, Update PMH in EMR and  
Vital Signs    
Coordination of Care: Complex Care and Chronic Disease 1-5, Consent,records   
obtained, informed consent, Education Simp Pt/Fam, Results/Orders obtained and   
Staff clarify orders    
Special Needs: Language special needs    
Established Patient Charge    
Established Patient Point Assignment: 90    
Established Patient Point Charge: EP Level 3 ()    
    
    
Patient Portal Questionaires    
Social History    
Living Situation History    
Housing: trailer    
Tobacco History    
Smoking Status: Never smoker    
Alcohol History    
Alcohol Intake: Never    
Domestic Abuse History    
Do You Feel Safe at Home: Yes    
    
Review of Systems    
Report any current symptoms    
Only answer those that you have currently:     
    
Past Medical History    
Past Medical History    
Have you ever been diagnosed with any of the following:     
Neurological Problems    
Seizures: No    
Cardiology Problems    
Congestive Heart Failure: No    
Respiratory Problems    
Chronic Obstructive Pulmonary Disease (COPD): No    
Asthma: No    
Genital/Urinary Problems    
Renal Disease: No    
Endocrine Problems    
Diabetes Mellitus Type 1: No    
Diabetes Mellitus Type 2: No    
Blood Problems    
Sickle Cell Disease: No    
Other Problems    
Blood Transfusions: No    
Blood Transfusion Reaction: No    
Anesthesia Reactions: No

## 2025-01-20 ENCOUNTER — HOSPITAL ENCOUNTER (OUTPATIENT)
Dept: HOSPITAL 104 - SWHD | Age: 50
Discharge: HOME | End: 2025-01-20
Payer: MEDICAID

## 2025-01-20 DIAGNOSIS — K50.90: ICD-10-CM

## 2025-01-20 DIAGNOSIS — L98.412: ICD-10-CM
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
## 2025-01-20 DIAGNOSIS — S31.819D: Primary | ICD-10-CM

## 2025-01-20 DIAGNOSIS — K60.30: ICD-10-CM

## 2025-01-20 DIAGNOSIS — L92.9: ICD-10-CM

## 2025-01-20 DIAGNOSIS — D64.9: ICD-10-CM

## 2025-01-20 DIAGNOSIS — N63.0: ICD-10-CM

## 2025-01-20 PROCEDURE — G0463 HOSPITAL OUTPT CLINIC VISIT: HCPCS

## 2025-01-20 PROCEDURE — A9270 NON-COVERED ITEM OR SERVICE: HCPCS

## 2025-01-20 PROCEDURE — 99213 OFFICE O/P EST LOW 20 MIN: CPT

## 2025-01-27 ENCOUNTER — HOSPITAL ENCOUNTER (OUTPATIENT)
Dept: HOSPITAL 104 - SWHD | Age: 50
Discharge: HOME | End: 2025-01-27
Payer: MEDICAID

## 2025-01-27 DIAGNOSIS — N63.0: ICD-10-CM

## 2025-01-27 DIAGNOSIS — S31.819D: Primary | ICD-10-CM

## 2025-01-27 DIAGNOSIS — L98.412: ICD-10-CM
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
## 2025-01-27 DIAGNOSIS — D64.9: ICD-10-CM

## 2025-01-27 DIAGNOSIS — L92.9: ICD-10-CM

## 2025-01-27 PROCEDURE — G0463 HOSPITAL OUTPT CLINIC VISIT: HCPCS

## 2025-01-27 PROCEDURE — A9270 NON-COVERED ITEM OR SERVICE: HCPCS

## 2025-01-27 PROCEDURE — 99213 OFFICE O/P EST LOW 20 MIN: CPT

## 2025-02-14 ENCOUNTER — HOSPITAL ENCOUNTER (OUTPATIENT)
Dept: HOSPITAL 104 - SWHD | Age: 50
Discharge: HOME | End: 2025-02-14
Payer: MEDICAID
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
## 2025-02-14 DIAGNOSIS — L92.9: ICD-10-CM

## 2025-02-14 DIAGNOSIS — K50.90: ICD-10-CM

## 2025-02-14 DIAGNOSIS — D64.9: ICD-10-CM

## 2025-02-14 DIAGNOSIS — S31.819D: Primary | ICD-10-CM

## 2025-02-14 DIAGNOSIS — L98.412: ICD-10-CM

## 2025-02-14 DIAGNOSIS — N63.0: ICD-10-CM

## 2025-02-14 PROCEDURE — G0463 HOSPITAL OUTPT CLINIC VISIT: HCPCS

## 2025-02-14 PROCEDURE — A9270 NON-COVERED ITEM OR SERVICE: HCPCS

## 2025-02-14 PROCEDURE — 99213 OFFICE O/P EST LOW 20 MIN: CPT

## 2025-03-07 ENCOUNTER — HOSPITAL ENCOUNTER (OUTPATIENT)
Age: 50
Discharge: HOME | End: 2025-03-07
Payer: MEDICAID

## 2025-03-07 DIAGNOSIS — L92.9: ICD-10-CM

## 2025-03-07 DIAGNOSIS — D64.9: ICD-10-CM
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
## 2025-03-07 DIAGNOSIS — R60.0: ICD-10-CM

## 2025-03-07 DIAGNOSIS — S31.819D: Primary | ICD-10-CM

## 2025-03-07 DIAGNOSIS — L98.412: ICD-10-CM

## 2025-03-07 DIAGNOSIS — N63.0: ICD-10-CM

## 2025-03-07 PROCEDURE — 99213 OFFICE O/P EST LOW 20 MIN: CPT

## 2025-03-07 PROCEDURE — A9270 NON-COVERED ITEM OR SERVICE: HCPCS

## 2025-03-07 PROCEDURE — G0463 HOSPITAL OUTPT CLINIC VISIT: HCPCS

## 2025-03-08 ENCOUNTER — HOSPITAL ENCOUNTER (INPATIENT)
Dept: HOSPITAL 104 - SERX | Age: 50
LOS: 3 days | Discharge: HOME | DRG: 245 | End: 2025-03-11
Payer: MEDICAID

## 2025-03-08 VITALS — RESPIRATION RATE: 18 BRPM | HEART RATE: 65 BPM

## 2025-03-08 VITALS
RESPIRATION RATE: 16 BRPM | OXYGEN SATURATION: 100 % | SYSTOLIC BLOOD PRESSURE: 98 MMHG | HEART RATE: 70 BPM | TEMPERATURE: 98.06 F | DIASTOLIC BLOOD PRESSURE: 65 MMHG

## 2025-03-08 VITALS
RESPIRATION RATE: 19 BRPM | DIASTOLIC BLOOD PRESSURE: 63 MMHG | SYSTOLIC BLOOD PRESSURE: 104 MMHG | OXYGEN SATURATION: 99 % | TEMPERATURE: 97.1 F | HEART RATE: 70 BPM

## 2025-03-08 VITALS
RESPIRATION RATE: 18 BRPM | OXYGEN SATURATION: 95 % | HEART RATE: 72 BPM | SYSTOLIC BLOOD PRESSURE: 96 MMHG | DIASTOLIC BLOOD PRESSURE: 52 MMHG | TEMPERATURE: 97.88 F

## 2025-03-08 VITALS
SYSTOLIC BLOOD PRESSURE: 118 MMHG | TEMPERATURE: 98 F | OXYGEN SATURATION: 100 % | DIASTOLIC BLOOD PRESSURE: 67 MMHG | RESPIRATION RATE: 19 BRPM | HEART RATE: 69 BPM

## 2025-03-08 VITALS
OXYGEN SATURATION: 100 % | HEART RATE: 61 BPM | TEMPERATURE: 97.7 F | SYSTOLIC BLOOD PRESSURE: 112 MMHG | RESPIRATION RATE: 18 BRPM | DIASTOLIC BLOOD PRESSURE: 69 MMHG

## 2025-03-08 VITALS
TEMPERATURE: 98.1 F | SYSTOLIC BLOOD PRESSURE: 104 MMHG | DIASTOLIC BLOOD PRESSURE: 60 MMHG | RESPIRATION RATE: 19 BRPM | HEART RATE: 63 BPM | OXYGEN SATURATION: 100 %

## 2025-03-08 VITALS
TEMPERATURE: 98.2 F | RESPIRATION RATE: 18 BRPM | SYSTOLIC BLOOD PRESSURE: 110 MMHG | OXYGEN SATURATION: 97 % | HEART RATE: 93 BPM | DIASTOLIC BLOOD PRESSURE: 76 MMHG

## 2025-03-08 DIAGNOSIS — K61.0: ICD-10-CM

## 2025-03-08 DIAGNOSIS — E87.6: ICD-10-CM

## 2025-03-08 DIAGNOSIS — K22.10: ICD-10-CM

## 2025-03-08 DIAGNOSIS — K50.913: Primary | ICD-10-CM

## 2025-03-08 DIAGNOSIS — Z79.899: ICD-10-CM

## 2025-03-08 DIAGNOSIS — R06.6: ICD-10-CM

## 2025-03-08 DIAGNOSIS — N10: ICD-10-CM

## 2025-03-08 DIAGNOSIS — D50.9: ICD-10-CM

## 2025-03-08 DIAGNOSIS — Z79.624: ICD-10-CM

## 2025-03-08 DIAGNOSIS — K29.70: ICD-10-CM

## 2025-03-08 DIAGNOSIS — Z88.2: ICD-10-CM

## 2025-03-08 LAB
ALANINE AMINOTRANSFERASE: < 7 U/L (ref 10–49)
ALBUMIN, SERUM: 3.6 GM/DL (ref 3.5–5)
ALBUMIN/GLOBULIN RATIO: 1.2 (ref 1.2–2.2)
ALKALINE PHOSPHATASE: 64 U/L (ref 46–116)
ANION GAP: 8 (ref 7–16)
ASPARTATE AMINO TRANSFERASE: < 10 U/L (ref 0–34)
BASOPHILS # (AUTO): 0 THOU/MM3 (ref 0–0.2)
BASOPHILS % (AUTO): 0 % (ref 0–2.5)
BILIRUBIN,TOTAL: 0.6 MG/DL (ref 0.3–1.2)
BLOOD UREA NITROGEN: 10 MG/DL (ref 9–23)
BLOOD,URINE: (no result)
BUN/CREATININE RATIO: 14 RATIO (ref 12–20)
CALCIUM (CORRECTED): 9.1 MG/DL (ref 8.5–10.1)
CALCIUM: 8.8 MG/DL (ref 8.3–10.6)
CARBON DIOXIDE: 27.2 MMOL/L (ref 20–31)
CHLORIDE: 104 MMOL/L (ref 98–107)
CREAT CL PREDICTED SERPL C-G-VRATE: 101.6 ML/MIN (ref 60–?)
CREATININE (COMPONENT): 0.7 MG/DL (ref 0.6–1.3)
EGFR: > 60 SEE NOTE
EOSINOPHILS # (AUTO): 0 THOU/MM3 (ref 0–0.5)
EOSINOPHILS % (AUTO): 1 % (ref 0–10)
GLOBULIN: 3 GM/DL (ref 2.3–3.5)
GLUCOSE: 103 MG/DL (ref 74–106)
HEMATOCRIT: 33.1 % (ref 41–53)
HEMOGLOBIN: 10.1 G/DL (ref 13.5–16)
IMM GRANULOCYTES # BLD AUTO: 0.02 THOU/MM3 (ref 0–0)
IMMATURE GRANULOCYTES % (AUTO): 0 % (ref 0–0)
LIPASE: 26 U/L (ref 12–53)
LYMPHOCYTES # (AUTO): 1.4 THOU/MM3 (ref 1–4.8)
LYMPHOCYTES % (AUTO): 25 % (ref 10–50)
MAGNESIUM: 2.2 MG/DL (ref 1.6–2.6)
MEAN CORPUSCULAR HEMOGLOBIN: 20.4 PG (ref 25–35)
MEAN CORPUSCULAR HGB CONC: 30.5 G/DL (ref 31–37)
MEAN CORPUSCULAR VOLUME: 67 FL (ref 80–100)
MONOCYTES # (AUTO): 0.4 THOU/MM3 (ref 0–0.8)
MONOCYTES % (AUTO): 7 % (ref 0–12)
NEUTROPHILS # (AUTO): 3.8 THOU/MM3 (ref 1.8–7.7)
NEUTROPHILS % (AUTO): 67 % (ref 37–80)
NRBC BLD-RTO: 0 /100 WBC
NUCLEATED RED BLOOD CELL #: 0 THOU/MM3 (ref 0–0)
OSMOLALITY,CALCULATED: 276 (ref 275–295)
PH,URINE: 6.5 (ref 5–7)
PLATELET COUNT: 352 THOU/MM3 (ref 140–440)
POTASSIUM: 3.3 MMOL/L (ref 3.4–5.1)
PROTEIN,URINE: (no result)
RBC,URINE: 43 /HPF (ref 0–3)
RDW STANDARD DEVIATION: 42.3 FL (ref 35.1–43.9)
RED BLOOD COUNT: 4.94 MILN/MM3 (ref 4.5–5.9)
SODIUM: 139 MMOL/L (ref 136–145)
SPECIFIC GRAVITY,URINE: 1.02 (ref 1–1.03)
SQUAMOUS EPITHELIAL CELL,URINE: 1 /HPF (ref 0–5)
TOTAL PROTEIN: 6.6 GM/DL (ref 5.7–8.2)
TRANSITIONAL EPI CELLS,URINE: 2 /HPF (ref 0–5)
WBC,URINE: 285 /HPF (ref 0–5)
WHITE BLOOD COUNT: 5.7 THOU/MM3 (ref 3.8–10.6)

## 2025-03-08 PROCEDURE — 81001 URINALYSIS AUTO W/SCOPE: CPT

## 2025-03-08 PROCEDURE — 83540 ASSAY OF IRON: CPT

## 2025-03-08 PROCEDURE — 80053 COMPREHEN METABOLIC PANEL: CPT

## 2025-03-08 PROCEDURE — 96375 TX/PRO/DX INJ NEW DRUG ADDON: CPT

## 2025-03-08 PROCEDURE — 74177 CT ABD & PELVIS W/CONTRAST: CPT

## 2025-03-08 PROCEDURE — 87086 URINE CULTURE/COLONY COUNT: CPT

## 2025-03-08 PROCEDURE — 96361 HYDRATE IV INFUSION ADD-ON: CPT

## 2025-03-08 PROCEDURE — 99285 EMERGENCY DEPT VISIT HI MDM: CPT

## 2025-03-08 PROCEDURE — 83735 ASSAY OF MAGNESIUM: CPT

## 2025-03-08 PROCEDURE — 82728 ASSAY OF FERRITIN: CPT

## 2025-03-08 PROCEDURE — 83605 ASSAY OF LACTIC ACID: CPT

## 2025-03-08 PROCEDURE — 83690 ASSAY OF LIPASE: CPT

## 2025-03-08 PROCEDURE — 83550 IRON BINDING TEST: CPT

## 2025-03-08 PROCEDURE — 85025 COMPLETE CBC W/AUTO DIFF WBC: CPT

## 2025-03-08 PROCEDURE — A9270 NON-COVERED ITEM OR SERVICE: HCPCS

## 2025-03-08 PROCEDURE — 36415 COLL VENOUS BLD VENIPUNCTURE: CPT

## 2025-03-08 PROCEDURE — 84100 ASSAY OF PHOSPHORUS: CPT

## 2025-03-08 PROCEDURE — 96376 TX/PRO/DX INJ SAME DRUG ADON: CPT

## 2025-03-08 PROCEDURE — 96365 THER/PROPH/DIAG IV INF INIT: CPT

## 2025-03-08 RX ADMIN — SODIUM CHLORIDE 125 ML: 9 INJECTION, SOLUTION INTRAVENOUS at 14:14

## 2025-03-08 RX ADMIN — MESALAMINE 500 MG: 250 CAPSULE ORAL at 19:43

## 2025-03-08 RX ADMIN — SODIUM CHLORIDE 999 ML: 9 INJECTION, SOLUTION INTRAVENOUS at 08:39

## 2025-03-08 NOTE — PD.EDRME
Rapid Medical Screening Exam
RME
Arrival date/time: 
03/08/25 07:28
49-year-old male with medical history significant for Crohn's disease as well as anal fistula currently followed by specialist in [place] presents to the emergency department today complaints of nausea vomiting abdominal pain as well as hiccups.
Chief Complaint: Extremity Problem,Nontraumatic
Time Seen by Provider: 03/08/25 07:30
Vital signs: 

Vital Signs

Temperature  98.2 F   03/08/25 07:48
Pulse Rate  93   03/08/25 07:48
Respiratory Rate  18   03/08/25 07:48
Blood Pressure  110/76   03/08/25 07:48
Pulse Oximetry (%)  97   03/08/25 07:48
Oxygen Delivery Method  Room Air   03/08/25 07:48

## 2025-03-08 NOTE — PRELIM_ITS
CT scan of the abdomen and pelvis with intravenous contrast (axial sections 
with sagittal and coronal reformats) March 08, 2025 1124 hours  
   
 Clinical History: Epigastric pain  
   
 Comparison: No prior study is available for comparison.   
   
 Findings:   
 The lung bases are clear.  
 The liver, gallbladder, pancreas, spleen and adrenals are unremarkable. There 
is a right renal cyst, measuring 1 cm.  
 No evidence of bowel obstruction. There is mild thickening of the caecum, 
descending, sigmoid and transverse colon with associated fat stranding and 
mucosal enhancement. The appendix is thickened, measuring 23 mm in caliber with 
mural enhancement and periappendiceal fat stranding. There is no mesenteric or 
retroperitoneal adenopathy.  
 The urinary bladder is incompletely distended at the time of the examination 
and appears mildly thick-walled.    
 There is rectal wall thickening with perirectal fat stranding. There is a 4 x 5
cm peripherally enhancing fluid collection with surrounding fat stranding in the
right perianal region, consistent with an abscess, with fat stranding extending 
to the right gluteal fold. On the background of edema, a fistula is 
differentiated, with exit to the skin of the perianal area on the right side. 
There is no free fluid or free air.  
 The osseous structures are unremarkable.  
   
 Impression:  
 1. Acute appendicitis without evidence of free air or abscess.  
 2. A 4 x 5 cm peripherally enhancing fluid collection with surrounding fat 
stranding in the right perianal region, consistent with an abscess, with fat 
stranding extending to the right gluteal fold. Fistula of the perianal area.   
 3. Thickening of the caecum, descending, sigmoid and transverse colon with 
associated fat stranding and mucosal enhancement, suggestive of colitis of 
inflammatory or infectious etiology.  
   
   
 Discussion Details:   
  Results verbally communicated to : Dr. Moses  at 01:18 PM 03/08/2025  
  Report Electronically Signed By: Demetris Law 3/8/2025 1:27:32 PM [EST]

## 2025-03-08 NOTE — ESHP_ITS
Documentation for date of:    
03/08/25    
    
    
[facility]    
History of Present Illness    
History of present illness:     
Mr. Batista is a 48 year-old Indonesian-speaking only male with history of Crohn's  
disease (diagnosed july 2024) presented to the ED complaining of generalized   
weakness and hiccups for the past 1 week.  Patient states he has a known history  
of IBD and has been feeling ill ever since the diagnosis with worsening   
intermittent symptoms.  Pt states the non stop hiccups are the most bothersome   
to him. Patient states that he is unable to eat very much due to early satiety   
and is compliant on his medication.  Patient states he has perianal abscess   
which has been draining for a while causing a perianal fistula.  For the past 1   
weeks patient has been having increased bruising, fever, chills and feeling cold  
in his hands and feet and unable to eat very much.  Patient also had off and on   
formed stool with episodes of diarrhea and mucus in his stool denies noticing   
melena or hematochezia.  Patient states he is also been having right lower   
quadrant abdominal tenderness.  Patient is following Dr. Gale of the [facility] and has been compliant with all his medications. Patient is also   
being followed by gastroenterologist, Dr. Grove, in [place]. He is currently  
on azathioprine 50 mg daily, mesalamine 1.2 g QID, prednisone 60 mg daily, and   
adalimumab/Humira 80 mg injections q2 weeks.     
    
ED course    
Vitals: Blood pressure 110/76, pulse 93, saturating on room air    
Labs: Hemoglobin 10.1 hematocrit 33.1 MCV 67, MCH 20.4, MCHC 30.5, potassium 3.3    
Urinalysis is leukocyte esterase positive, hematuria, pyuria    
    
In the ED patient received metoclopramide x 2 and 1 L bolus of normal saline    
    
PMH: Crohn's disease    
PSH: no surgical history    
SH: Patient denies smoking or drug use, occasionally drinks alcohol    
Allergies: Sulfamethizole    
Home Meds: azathioprine 50 mg daily, mesalamine 1.2 g QID, prednisone 60 mg   
daily, and adalimumab/Humira 80 mg injections q2 weeks.     
    
    
Review of Systems    
Review of Systems    
Systems Reviewed: All systems reviewed, normal except as documented    
    
Exam    
Vital Signs    
                                            
    
    
    
Temp Pulse Resp BP Pulse Ox O2 Del Method    
     
 98.0 F   65   18   118/67   100   Room Air     
     
 03/08/25 14:07  03/08/25 14:37  03/08/25 14:37  03/08/25 14:07  03/08/25 14:07   
03/08/25 14:07    
    
    
    
Narrative Exam    
    
GENERAL: A&Ox3 . Awake, Not in acute distress    
NEURO: no focal neurological deficits     
HEENT: Atraumatic, Normocephalic. mucous membranes moist. Eyes open,   
symmetrical, & clear    
HEART: Normal Heart Sounds    
LUNGS: Clear to auscultation with no wheezing or crackles.    
ABDOMEN: soft, non-distended, tenderness in LLQ     
SKIN: No Rash or ecchymoses, perianal fistula    
EXTREMITIES: No edema, tenderness, able to move all 4 extremities, pedal pulses   
palpated    
    
Results:    
Labs    
    
                                                        03/08/25 08:20              
    
                                                        03/08/25 08:20              
    
Labs:     
                                    Short CBC    
    
    
    
  03/08/25 Range/Units    
    
  08:20     
     
WBC  5.7  (3.8-10.6)  Thou/mm3    
     
Hgb  10.1 L  (13.5-16.0)  g/dL    
     
Hct  33.1 L  (41.0-53.0)  %    
     
Plt Count  352  (140-440)  Thou/mm3    
    
    
                                       BMP    
    
    
    
  03/08/25    
    
  08:20    
     
Sodium  139    
     
Potassium  3.3 L    
     
Chloride  104    
     
Carbon Dioxide  27.2    
     
BUN  10    
     
Creatinine  0.7    
     
Glucose  103    
     
Calcium  8.8    
    
    
                                 Liver Function    
    
    
    
  03/08/25 Range/Units    
    
  08:20     
     
Total Bilirubin  0.6  (0.3-1.2)  mg/dL    
     
AST  < 10  (0-34)  U/L    
     
ALT  < 7 L  (10-49)  U/L    
     
Alkaline Phosphatase  64  ()  U/L    
     
Albumin  3.6  (3.5-5.0)  gm/dL    
    
    
                                      Urine    
    
    
    
  03/08/25 Range/Units    
    
  08:39     
     
Urine Color  Yellow  (Lt Yel-Yel)      
     
Urine Clarity  Turbid A  (Clear/Hazy)      
     
Urine pH  6.5  (5.0-7.0)      
     
Ur Specific Gravity  1.017  (1.001-1.035)      
     
Urine Protein  1+ A  (Neg - Trace)      
     
Urine Glucose (UA)  Negative  (Negative)      
    
    
    
    
Quality Measures    
Quality Measures    
none    
    
Medications    
Home Medications and Allergies    
                                Home Medications    
    
    
    
?Medication ?Instructions ?Recorded ?Confirmed ?Type    
     
                          SALOFALK                  500 mg PO 08/01/24 01/13/25     
History    
     
                          diphenoxylate-atropine 2.5 See Rx Instructions .Route     
08/21/24 01/13/25 History    
    
                          mg-0.025 mg tablet        .COMPLEX PRN Diarrhea       
     
                          mesalamine 500 mg capsule,extended 1,000 mg PO BID 08/ 28/24 01/13/25 History    
    
                                        release (Pentasa)        
    
    
    
                                    Allergies    
    
    
    
Allergy/AdvReac Type Severity Reaction Status Date / Time    
     
sulfamethizole Allergy Mild Rash Verified 01/13/25 14:59    
    
    
    
Visit Medications    
                                            
    
Acetaminophen (Acetaminophen 325 Mg Tablet)  650 mg PO Q6H PRN    
   PRN Reason: Fever >101.5    
   Stop: 04/07/25 14:29    
Sodium Chloride (Ns)  1,000 mls @ 125 mls/hr IV .Q8H ONE    
   Stop: 03/08/25 22:09    
   Last Admin: 03/08/25 14:14 Dose:  125 mls/hr    
       
Piperacillin Sod/Tazobactam (Sod 3.375 gm/ Sodium Chloride)  50 mls @ 12.5   
mls/hr IV Q8HR [facility]    
   Stop: 03/15/25 21:59    
Oxycodone/Acetaminophen (Oxycodone/Apap 5/325 Tablet)  1 tab PO Q6H PRN    
   PRN Reason: PAIN SCALE 4-6 (Moderate    
   Stop: 03/13/25 14:29    
    
Discontinued Medications    
    
Sodium Chloride (Ns)  250 mls @ 999 mls/hr IV .Q16M ONE    
   Stop: 03/08/25 08:16    
   Last Admin: 03/08/25 08:36 Dose:  Not Given    
       
Sodium Chloride (Ns)  1,000 mls @ 999 mls/hr IV .Q1H1M ONE    
   Stop: 03/08/25 09:37    
   Last Infusion: 03/08/25 09:47 Dose:  Infused    
       
Piperacillin Sod/Tazobactam (Sod 3.375 gm/ Sodium Chloride)  50 mls @ 100 mls/hr  
IV X1 ONE    
   Stop: 03/08/25 14:08    
   Last Infusion: 03/08/25 14:44 Dose:  Infused    
       
Sodium Chloride (Ns)  1,000 mls @ 75 mls/hr IV .[id number] [facility]    
   Stop: 03/09/25 03:54    
   Last Admin: 03/08/25 14:44 Dose:  Not Given    
       
Piperacillin Sod/Tazobactam (Sod 3.375 gm/ Sodium Chloride)  50 mls @ 100 mls/hr  
IV Q6HR [facility]    
   Stop: 03/15/25 14:41    
   Last Admin: 03/08/25 14:44 Dose:  Not Given    
       
Metoclopramide HCl (Metoclopramide Inj 5 Mg/Ml Vial 2 Ml)  10 mg IVP X1 ONE;   
Protocol    
   Stop: 03/08/25 08:02    
   Last Admin: 03/08/25 08:30 Dose:  10 mg    
       
Metoclopramide HCl (Metoclopramide Inj 5 Mg/Ml Vial 2 Ml)  10 mg IVP X1 ONE;   
Protocol    
   Stop: 03/08/25 13:37    
   Last Admin: 03/08/25 14:03 Dose:  10 mg    
       
    
    
    
Assessment & Plan    
Plan    
Mr. Batista is a 48 year-old Indonesian-speaking only male with history of Crohn's  
disease (diagnosed july 2024) presented to the ED complaining of generalized   
weakness and hiccups for the past 1 week.     
    
#Irritable bowel disease    
#Crohn's disease    
#Perianal fistula    
-Patient underwent colonoscopy and biopsies and was diagnosed with Crohn's   
disease in July 2024    
-Patient has been feeling generalized weakness, hiccups, early satiety and   
intermittent loose stool with mucus    
-Patient has been compliant with all his medications.    
-CT of abdomen pelvis-inflammation medial to the cecum, right pyelonephritis and  
right Perianal abscess, diffuse colitis and proctitis    
Plan:     
-IV antibiotics and IV fluids ordered    
-Patient is on low fiber diet    
-Surgery was initially consulted by ED for acute appendicitis however CT scan is  
not evident of acute appendicitis therefore patient will not undergo surgical   
intervention for the perianal fistula.    
-Home mesalamine resumed     
    
#?Right Acute pyelonephritis    
#UTI    
-Patient is denying any symptoms of dysuria or urinary urgency but does complain  
of right flank pain    
-Ct scan is evident of acute pylonephritis     
-Urinalysis is leukocyte esterase positive, hematuria, pyuria    
-Pt is on ICV antibiotics- zosyn 03/08-    
    
#Microcytic anemia    
-Hgb 10.1, Hct 33.1, MCV 67    
-no signs of active bleeding, likely in the setting of crohns disease, will   
continue to monitor with daily cbc    
    
#Hypokalemia    
-Potassium on admission 3.3    
-will continue to monitor and repleted as needed    
    
Health Maintenance    
Disposition: Medsurg for IV antibiotics    
DVT Prophylaxis: enoxaparin 40mg Qdaily     
GI Prophylaxis: no indicated    
Diet: low fiber diet     
Lines: Peripheral lines    
Code status: Full    
    
Assessment and plan discussed with my attending physician Dr. Berny Hudson (PGY-1)- Internal medicine resident    
    
Attending Provider Attestation/Addendum    
I have discussed and was present for the essential components of the history,   
physical examination, diagnosis, and treatment plan with the resident. I agree   
with the patient's care as documented by the resident and amended herein by me.   
Enrike Arrieta DO.    
    
Patient seen and evaluated in the emergency department.  Patient admitted for   
nausea, vomiting and hiccups x 1 week.  Patient has history of Crohn's and anal   
fistula, does follow with specialist in [place] and at our [facility].  Presently on mesalamine, prednisone and recently on a course of Flagyl   
at home.  In the ED, vital signs stable, the patient was afebrile, CBC   
remarkable for hemoglobin of 10.1, CMP demonstrating a low potassium of 3.3.    
Urinalysis was positive.  CTAP did demonstrate perianal abscess, possible   
pyelonephritis and bowel inflammation.,  He was given 3250 mL of NS and Zosyn in  
the ED    
    
Patient admitted to [facility], will continue Zosyn at this time for perianal   
abscess which will also cover for any potential UTI and pyelonephritis.  Urine   
cultures are pending.  General surgery was also consulted in the ED emergency   
department, however no surgical intervention at this time.  Potassium will be   
repleted, iron panel ordered for the patient's anemia, we will consider GI   
consult in the a.m. if warranted.    
    
Although this document has been carefully reviewed, there may still be some   
phonetic and other typographical errors.  These errors are purely grammatical   
due to imperfections in the software program and should not be construed in any   
way to compromise the substance of the patient's medical care during this visit.

## 2025-03-08 NOTE — PD.RESHP
Documentation for date of:
03/08/25


[facility]
History of Present Illness
History of present illness: 
Mr. Batista is a 48 year-old Lithuanian-speaking only male with history of Crohn's disease (diagnosed july 2024) presented to the ED complaining of generalized weakness and hiccups for the past 1 week.  Patient states he has a known history of IBD and 
has been feeling ill ever since the diagnosis with worsening intermittent symptoms.  Pt states the non stop hiccups are the most bothersome to him. Patient states that he is unable to eat very much due to early satiety and is compliant on his 
medication.  Patient states he has perianal abscess which has been draining for a while causing a perianal fistula.  For the past 1 weeks patient has been having increased bruising, fever, chills and feeling cold in his hands and feet and unable to 
eat very much.  Patient also had off and on formed stool with episodes of diarrhea and mucus in his stool denies noticing melena or hematochezia.  Patient states he is also been having right lower quadrant abdominal tenderness.  Patient is following 
Dr. Gale of the [facility] and has been compliant with all his medications. Patient is also being followed by gastroenterologist, Dr. Grove, in [place]. He is currently on azathioprine 50 mg daily, mesalamine 1.2 g QID, prednisone 60 
mg daily, and adalimumab/Humira 80 mg injections q2 weeks. 

ED course
Vitals: Blood pressure 110/76, pulse 93, saturating on room air
Labs: Hemoglobin 10.1 hematocrit 33.1 MCV 67, MCH 20.4, MCHC 30.5, potassium 3.3
Urinalysis is leukocyte esterase positive, hematuria, pyuria

In the ED patient received metoclopramide x 2 and 1 L bolus of normal saline

PMH: Crohn's disease
PSH: no surgical history
SH: Patient denies smoking or drug use, occasionally drinks alcohol
Allergies: Sulfamethizole
Home Meds: azathioprine 50 mg daily, mesalamine 1.2 g QID, prednisone 60 mg daily, and adalimumab/Humira 80 mg injections q2 weeks. 


Review of Systems
Review of Systems
Systems Reviewed: All systems reviewed, normal except as documented

Exam
Vital Signs


Temp Pulse Resp BP Pulse Ox O2 Del Method
 98.0 F   65   18   118/67   100   Room Air 
 03/08/25 14:07  03/08/25 14:37  03/08/25 14:37  03/08/25 14:07  03/08/25 14:07  03/08/25 14:07


Narrative Exam

GENERAL: A&Ox3 . Awake, Not in acute distress
NEURO: no focal neurological deficits 
HEENT: Atraumatic, Normocephalic. mucous membranes moist. Eyes open, symmetrical, & clear
HEART: Normal Heart Sounds
LUNGS: Clear to auscultation with no wheezing or crackles.
ABDOMEN: soft, non-distended, tenderness in LLQ 
SKIN: No Rash or ecchymoses, perianal fistula
EXTREMITIES: No edema, tenderness, able to move all 4 extremities, pedal pulses palpated

Results:
Labs

03/08/25 08:20          

03/08/25 08:20          

Labs: 
Short CBC

  03/08/25 Range/Units
  08:20 
WBC  5.7  (3.8-10.6)  Thou/mm3
Hgb  10.1 L  (13.5-16.0)  g/dL
Hct  33.1 L  (41.0-53.0)  %
Plt Count  352  (140-440)  Thou/mm3

BMP

  03/08/25
  08:20
Sodium  139
Potassium  3.3 L
Chloride  104
Carbon Dioxide  27.2
BUN  10
Creatinine  0.7
Glucose  103
Calcium  8.8

Liver Function

  03/08/25 Range/Units
  08:20 
Total Bilirubin  0.6  (0.3-1.2)  mg/dL
AST  < 10  (0-34)  U/L
ALT  < 7 L  (10-49)  U/L
Alkaline Phosphatase  64  ()  U/L
Albumin  3.6  (3.5-5.0)  gm/dL

Urine

  03/08/25 Range/Units
  08:39 
Urine Color  Yellow  (Lt Yel-Yel)  
Urine Clarity  Turbid A  (Clear/Hazy)  
Urine pH  6.5  (5.0-7.0)  
Ur Specific Gravity  1.017  (1.001-1.035)  
Urine Protein  1+ A  (Neg - Trace)  
Urine Glucose (UA)  Negative  (Negative)  



Quality Measures
Quality Measures
none

Medications
Home Medications and Allergies
Home Medications

?Medication ?Instructions ?Recorded ?Confirmed ?Type
SALOFALK 500 mg PO 08/01/24 01/13/25 History
diphenoxylate-atropine 2.5 See Rx Instructions .Route 08/21/24 01/13/25 History
mg-0.025 mg tablet .COMPLEX PRN Diarrhea   
mesalamine 500 mg capsule,extended 1,000 mg PO BID 08/28/24 01/13/25 History
release (Pentasa)    


Allergies

Allergy/AdvReac Type Severity Reaction Status Date / Time
sulfamethizole Allergy Mild Rash Verified 01/13/25 14:59


Visit Medications


Acetaminophen (Acetaminophen 325 Mg Tablet)  650 mg PO Q6H PRN
 PRN Reason: Fever >101.5
 Stop: 04/07/25 14:29
Sodium Chloride (Ns)  1,000 mls @ 125 mls/hr IV .Q8H ONE
 Stop: 03/08/25 22:09
 Last Admin: 03/08/25 14:14 Dose:  125 mls/hr
 
Piperacillin Sod/Tazobactam (Sod 3.375 gm/ Sodium Chloride)  50 mls @ 12.5 mls/hr IV Q8HR [facility]
 Stop: 03/15/25 21:59
Oxycodone/Acetaminophen (Oxycodone/Apap 5/325 Tablet)  1 tab PO Q6H PRN
 PRN Reason: PAIN SCALE 4-6 (Moderate
 Stop: 03/13/25 14:29

Discontinued Medications

Sodium Chloride (Ns)  250 mls @ 999 mls/hr IV .Q16M ONE
 Stop: 03/08/25 08:16
 Last Admin: 03/08/25 08:36 Dose:  Not Given
 
Sodium Chloride (Ns)  1,000 mls @ 999 mls/hr IV .Q1H1M ONE
 Stop: 03/08/25 09:37
 Last Infusion: 03/08/25 09:47 Dose:  Infused
 
Piperacillin Sod/Tazobactam (Sod 3.375 gm/ Sodium Chloride)  50 mls @ 100 mls/hr IV X1 ONE
 Stop: 03/08/25 14:08
 Last Infusion: 03/08/25 14:44 Dose:  Infused
 
Sodium Chloride (Ns)  1,000 mls @ 75 mls/hr IV .[id number] [facility]
 Stop: 03/09/25 03:54
 Last Admin: 03/08/25 14:44 Dose:  Not Given
 
Piperacillin Sod/Tazobactam (Sod 3.375 gm/ Sodium Chloride)  50 mls @ 100 mls/hr IV Q6HR [facility]
 Stop: 03/15/25 14:41
 Last Admin: 03/08/25 14:44 Dose:  Not Given
 
Metoclopramide HCl (Metoclopramide Inj 5 Mg/Ml Vial 2 Ml)  10 mg IVP X1 ONE; Protocol
 Stop: 03/08/25 08:02
 Last Admin: 03/08/25 08:30 Dose:  10 mg
 
Metoclopramide HCl (Metoclopramide Inj 5 Mg/Ml Vial 2 Ml)  10 mg IVP X1 ONE; Protocol
 Stop: 03/08/25 13:37
 Last Admin: 03/08/25 14:03 Dose:  10 mg
 



Assessment & Plan
Plan
Mr. Batista is a 48 year-old Lithuanian-speaking only male with history of Crohn's disease (diagnosed july 2024) presented to the ED complaining of generalized weakness and hiccups for the past 1 week. 

#Irritable bowel disease
#Crohn's disease
#Perianal fistula
-Patient underwent colonoscopy and biopsies and was diagnosed with Crohn's disease in July 2024
-Patient has been feeling generalized weakness, hiccups, early satiety and intermittent loose stool with mucus
-Patient has been compliant with all his medications.
-CT of abdomen pelvis-inflammation medial to the cecum, right pyelonephritis and right Perianal abscess, diffuse colitis and proctitis
Plan: 
-IV antibiotics and IV fluids ordered
-Patient is on low fiber diet
-Surgery was initially consulted by ED for acute appendicitis however CT scan is not evident of acute appendicitis therefore patient will not undergo surgical intervention for the perianal fistula.
-Home mesalamine resumed 

#?Right Acute pyelonephritis
#UTI
-Patient is denying any symptoms of dysuria or urinary urgency but does complain of right flank pain
-Ct scan is evident of acute pylonephritis 
-Urinalysis is leukocyte esterase positive, hematuria, pyuria
-Pt is on ICV antibiotics- zosyn 03/08-

#Microcytic anemia
-Hgb 10.1, Hct 33.1, MCV 67
-no signs of active bleeding, likely in the setting of crohns disease, will continue to monitor with daily cbc

#Hypokalemia
-Potassium on admission 3.3
-will continue to monitor and repleted as needed

Health Maintenance
Disposition: Medsurg for IV antibiotics
DVT Prophylaxis: enoxaparin 40mg Qdaily 
GI Prophylaxis: no indicated
Diet: low fiber diet 
Lines: Peripheral lines
Code status: Full

Assessment and plan discussed with my attending physician Dr. Berny Hudson (PGY-1)- Internal medicine resident

Attending Provider Attestation/Addendum
I have discussed and was present for the essential components of the history, physical examination, diagnosis, and treatment plan with the resident. I agree with the patient's care as documented by the resident and amended herein by me. Enrike Arrieta DO.

Patient seen and evaluated in the emergency department.  Patient admitted for nausea, vomiting and hiccups x 1 week.  Patient has history of Crohn's and anal fistula, does follow with specialist in [place] and at our [facility].  
Presently on mesalamine, prednisone and recently on a course of Flagyl at home.  In the ED, vital signs stable, the patient was afebrile, CBC remarkable for hemoglobin of 10.1, CMP demonstrating a low potassium of 3.3.  Urinalysis was positive.  
CTAP did demonstrate perianal abscess, possible pyelonephritis and bowel inflammation.,  He was given 3250 mL of NS and Zosyn in the ED

Patient admitted to [facility], will continue Zosyn at this time for perianal abscess which will also cover for any potential UTI and pyelonephritis.  Urine cultures are pending.  General surgery was also consulted in the ED emergency department, 
however no surgical intervention at this time.  Potassium will be repleted, iron panel ordered for the patient's anemia, we will consider GI consult in the a.m. if warranted.

Although this document has been carefully reviewed, there may still be some phonetic and other typographical errors.  These errors are purely grammatical due to imperfections in the software program and should not be construed in any way to 
compromise the substance of the patient's medical care during this visit.

## 2025-03-08 NOTE — XR_ITS
Examination:  
   
CT abdomen with intravenous contrast  
CT pelvis with intravenous contrast  
2-D coronal reconstructions  
2-D sagittal reconstructions  
   
Date and time of exam:March 8, 2025 1130 hrs.  
   
Indications: Onset abdominal pain epigastric pain today.  
   
CTDI: vol (mGy) 4.53  
DLP: (mGycm) 240   
   
Technique:   
Multiple axial sections of the abdomen and pelvis have been obtained.  
64 slice high-resolution scanner used.  
3 mm axial sections have been obtained, post intravenous injection 100 cc  
[id number]   
2-D sagittal, coronal reconstructions obtained.  
   
Low dose protocols were performed.  One or more of the following dose reduction  
techniques were used;  
automated exposure control, adjustment of the mA and/or KV according to patient  
size, use of   
iterative reconstruction technique.  
   
Findings:  
   
No focal liver or splenic lesions  
Mild splenomegaly  
No gallstones  
Edema involving the right kidney consistent with acute right, nephritis  
Aorta normal size  
There is inflammation medial to the cecum, the appendix is not third visualized  
Perianal small fluid collection  
   
There is acute right pyelonephritis  
There is diffuse colitis and proctitis  
There is a right perianal abscess 3 x 4 cm  
   
Impression:  
   
Inflammation medial to the cecum  
   
Right pyelonephritis  
   
Right perianal abscess

## 2025-03-08 NOTE — EDRME_ITS
Rapid Medical Screening Exam    
RME    
Arrival date/time:     
03/08/25 07:28    
49-year-old male with medical history significant for Crohn's disease as well as  
anal fistula currently followed by specialist in [place] presents to the   
emergency department today complaints of nausea vomiting abdominal pain as well   
as hiccups.    
Chief Complaint: Extremity Problem,Nontraumatic    
Time Seen by Provider: 03/08/25 07:30    
Vital signs:     
    
                                   Vital Signs    
    
    
    
Temperature  98.2 F   03/08/25 07:48    
     
Pulse Rate  93   03/08/25 07:48    
     
Respiratory Rate  18   03/08/25 07:48    
     
Blood Pressure  110/76   03/08/25 07:48    
     
Pulse Oximetry (%)  97   03/08/25 07:48    
     
Oxygen Delivery Method  Room Air   03/08/25 07:48

## 2025-03-08 NOTE — EDNOTE_ITS
ED General RME/HPI    
General    
Chief complaint: Extremity Problem,Nontraumatic    
Stated complaint: Extremity pain and bruising without trauma    
Time Seen by Provider: 03/08/25 07:30    
Arrival date/time:     
03/08/25 07:28    
    
RME / HPI    
RME / HPI narrative:     
03/08/25 07:28    
49-year-old male with medical history significant for Crohn's disease as well as  
anal fistula currently followed by specialist in [place] presents to the   
emergency department today complaints of nausea vomiting abdominal pain as well   
as hiccups.    
    
DR. MOSES   MAIN ED EVALUATION:    
    
49 year old male with past medical history significant for Crohn's disease and   
anal fistula currently followed by a specialist in [place] presents to the   
Emergency Department with complaints of intermittent hiccups onset 1 week.   
Associated nausea and vomiting, especially after hiccups episode. No diarrhea.     
    
Related Data    
                                Home Medications    
    
    
    
?Medication ?Instructions ?Recorded ?Confirmed    
     
                          SALOFALK                  500 mg PO 08/01/24 01/13/25    
     
                          diphenoxylate-atropine 2.5 See Rx Instructions .Route     
08/21/24 01/13/25    
    
                          mg-0.025 mg tablet        .COMPLEX PRN Diarrhea      
     
                          mesalamine 500 mg capsule,extended 1,000 mg PO BID 08/ 28/24 01/13/25    
    
                                        release (Pentasa)       
    
    
                                  Previous Rx's    
    
    
    
?Medication ?Instructions ?Recorded    
     
                          ibuprofen 600 mg tablet   600 mg PO Q6H PRN pain #30 t    
abs 01/11/24    
     
                          ciprofloxacin HCl 500 mg tablet 500 mg PO BID #14 tabs    
 12/21/24    
    
                                        (Cipro)      
    
    
    
                                    Allergies    
    
    
    
Allergy/AdvReac Type Severity Reaction Status Date / Time    
     
sulfamethizole Allergy Mild Rash Verified 01/13/25 14:59    
    
    
    
    
Review of Systems    
Review of Systems    
Systems Reviewed: All systems reviewed, normal except as documented    
Narrative Review of Systems:     
GEN: No fever, no chills, no weight loss, + intermittent hiccups (see HPI)    
EYES: No discharge, no visual changes, no pain    
HEENT: No ear pain, no congestion, no sore throat    
PULM: No shortness of breath, no cough, no congestion    
CV: No chest pain, no dyspnea on exertion, no palpitations    
GI: + nausea, + vomiting, no diarrhea, no pain, no constipation    
: No frequency, no urgency and no dysuria    
MUSC/SKEL: No joint pain, no back pain    
SKIN: No rash    
PSYCH: No hallucinations, no depression    
HEME/LYMPH: No easy bleeding or bruising tendencies    
NEURO: No weakness, no headache    
    
Past Medical History    
Past Medical History    
GASTROINTESTINAL: Positive Crohn's Disease    
Surgical History    
OTHER SURGICAL HX: ANAL FISTULA SURGERY    
Social History    
SMOKING STATUS: Never smoker    
SUBSTANCE USE: does not use    
ALCOHOL: Never    
    
ED Exam    
Narrative    
Physical exam:     
GENERAL APPEARANCE:  alert and oriented x 4, well-developed, well-nourished, no   
acute distress    
VITALS: All vitals were reviewed and the pulse ox is 100% on room air, which is   
normal according to my interpretation.    
HEENT: Normocephalic, atraumatic; pupils equal, round, reactive to light; EOMI;   
mucous membranes pink, moist; oropharynx clear    
NECK: Supple    
LUNGS: CTABL; no wheezes, no rales, no rhonchi    
HEART: Regular rate, regular rhythm; normal S1, S2; no murmurs    
ABDOMEN: non distended; normal BS;  soft, no tenderness, no guarding, no   
rebound; no masses, no organomegaly, no hernia      
BACK:  no CVA tenderness    
EXTREMITIES:  atraumatic; no edema    
NEUROLOGIC: awake; alert and oriented x4; cranial nerves II-XII grossly intact;   
no focal sensory or motor deficits    
PSYCHIATRIC:  appropriate mood and affect    
SKIN: warm, dry, normal color; no rashes    
    
    
Course    
Quality Measures    
none    
Orders    
    
                                            
    
    
    
 Category Date Time Status    
     
 CT Screening NOW Care  03/08/25 09:20 Active    
     
 Insert IV NOW Care  03/08/25 08:01 Active    
     
 NPO NOW Care  03/08/25 13:40 Active    
     
 Diet NPO (NOW) Diet  03/08/25 13:40 Completed    
     
 CT abdomen pelvis w con Stat Exams  03/08/25 09:20 Completed    
     
 CBC Stat Lab  03/08/25 08:20 Completed    
     
 Comprehensive Metabolic Panel Stat Lab  03/08/25 08:20 Completed    
     
 Lipase Stat Lab  03/08/25 08:20 Completed    
     
 Mag [Magnesium] Stat Lab  03/08/25 08:20 Completed    
     
 Path Review Blood Smear Stat Lab  03/08/25 08:20 Completed    
     
 UA, C/S IF [Urinalysis, C/S if Indicated] Stat Lab  03/08/25 08:39 Completed    
     
 Urine Culture Stat Lab  03/08/25 08:39 Received    
     
 Metoclopramide Inj [Reglan Inj] Med  03/08/25 08:01 Discontinued    
    
 10 mg IVP X1 ONE       
     
 Metoclopramide Inj [Reglan Inj] Med  03/08/25 13:36 Discontinued    
    
 10 mg IVP X1 ONE       
     
 Piper/Tazo Inj [Zosyn Inj] 3.375 gm Med  03/08/25 13:39 Discontinued    
    
 SODIUM CHLORIDE 0.9% (Popper) [Ns 0.9% (P)] 50 ml       
    
 IV X1       
     
 Sodium Chloride 0.9% 1000 ml [Ns] 1,000 ml Med  03/08/25 14:10 Active    
    
  mls/hr       
     
 Sodium Chloride 0.9% 1000 ml [Ns] 1,000 ml Med  03/08/25 08:37 Discontinued    
    
  mls/hr       
     
 Sodium Chloride 0.9% 250 ml [Ns] 250 ml Med  03/08/25 08:01 Discontinued    
    
  mls/hr       
    
    
    
    
Reevaluation(s)    
Reevaluation #1:     
Re-assessment at the time of disposition demonstrates that the patient is still   
having hiccups. No other complaints at this time. Ordered more reglan for him   
and waiting on Dr. Villagomez to call back, I left a message.     
Time: 13:32    
Vital Signs    
Vital signs:     
    
                                   Vital Signs    
    
    
    
Temperature  98.2 F   03/08/25 07:48    
     
Pulse Rate  93   03/08/25 07:48    
     
Respiratory Rate  18   03/08/25 07:48    
     
Blood Pressure  110/76   03/08/25 07:48    
     
Pulse Oximetry (%)  97   03/08/25 07:48    
     
Oxygen Delivery Method  Room Air   03/08/25 07:48    
    
    
    
    
    
[facility]    
Patient data    
External records reviewed:: [facility] previous records (Reviewed surgical follow-up   
note by Dr. Ritchie dated 01/13/25.)    
Clinical information provided by:: patient    
Social determinants that could affect healthcare access:: none    
Patient has the following chronic illnesses::     
Crohn's disease and anal fistula currently followed by a specialist in   
[place]    
How is presenting disease/condition affected by chronic disease/condition?: e  
xacerbated by    
Evaluation data    
The following diagnostics were reviewed and interpreted by me:: lab results and   
radiology exam(s)    
Lab and/or radiology exams considered but not ordered::     
none    
Interpretation Summary:     
                           [facility]    
                                [address] [place] [address]    
                                            
                           [facility] Preliminary Report     
                                      Draft    
Patient: MANJU SABA    
[facility]. Record#: [id number]    
YOB: 1975    
Account#: [id number]    
Age/Sex: 49 / M    
Location: [facility]    
Attending Dr:     
Ordering Physician:     
Date of Service:     
Procedure(s):     
Accession Number(s):     
    
cc: ~    
    
    
CT scan of the abdomen and pelvis with intravenous contrast (axial sections with  
 sagittal and coronal reformats) March 08, 2025 1124 hours    
    
Clinical History: Epigastric pain    
    
Comparison: No prior study is available for comparison.     
    
Findings:     
The lung bases are clear.    
The liver, gallbladder, pancreas, spleen and adrenals are unremarkable. There is  
 a right renal cyst, measuring 1 cm.    
No evidence of bowel obstruction. There is mild thickening of the caecum,   
descending, sigmoid and transverse colon with associated fat stranding and   
mucosal enhancement. The appendix is thickened, measuring 23 mm in caliber with   
mural enhancement and periappendiceal fat stranding. There is no mesenteric or   
retroperitoneal adenopathy.    
The urinary bladder is incompletely distended at the time of the examination and  
 appears mildly thick-walled.      
There is rectal wall thickening with perirectal fat stranding. There is a 4 x 5   
cm peripherally enhancing fluid collection with surrounding fat stranding in the  
 right perianal region, consistent with an abscess, with fat stranding extending  
 to the right gluteal fold. On the background of edema, a fistula is   
differentiated, with exit to the skin of the perianal area on the right side.   
There is no free fluid or free air.    
The osseous structures are unremarkable.    
    
Impression:    
1. Acute appendicitis without evidence of free air or abscess.    
2. A 4 x 5 cm peripherally enhancing fluid collection with surrounding fat   
stranding in the right perianal region, consistent with an abscess, with fat   
stranding extending to the right gluteal fold. Fistula of the perianal area.     
3. Thickening of the caecum, descending, sigmoid and transverse colon with   
associated fat stranding and mucosal enhancement, suggestive of colitis of   
inflammatory or infectious etiology.    
    
    
Discussion Details:     
 Results verbally communicated to : Dr. Moses  at 01:18 PM 03/08/2025    
 Report Electronically Signed By: Demetris Law 3/8/2025 1:27:32 PM [EST]    
    
Dictated By:    
    
Signed By:    
    
DD/DT: 03/08/25 1213    
    
TD/TT: 03/08/25 3926    
:     
    
    
    
-  
--------------------------------------------------------------------------------    
----------------------------------------------------------    
    
                           [facility]    
                                [address]    
                              [place] [address]    
                           [facility] Report     
                                     Signed    
Patient: MANJU SABA Record#: [id number]    
YOB: 1975    
Account#: [id number]    
Age/Sex: 49 / M    
Location: [facility]    
Attending Dr:     
    
Ordering Physician: Sruthi Moses MD    
Date of Service: 03/08/25    
Procedure(s): CT abdomen pelvis w con    
Accession Number(s): [id number]    
    
cc: Trent Daniels MD; Gurinder Oleary MD; Sruthi Moses MD~    
    
Examination:    
     
CT abdomen with intravenous contrast    
CT pelvis with intravenous contrast    
2-D coronal reconstructions    
2-D sagittal reconstructions    
     
Date and time of exam:March 8, 2025 1130 hrs.    
     
Indications: Onset abdominal pain epigastric pain today.    
     
CTDI: vol (mGy) 4.53    
DLP: (mGycm) 240     
     
Technique:     
Multiple axial sections of the abdomen and pelvis have been obtained.    
64 slice high-resolution scanner used.    
3 mm axial sections have been obtained, post intravenous injection 100 cc    
[id number]     
2-D sagittal, coronal reconstructions obtained.    
     
Low dose protocols were performed.  One or more of the following dose reduction    
techniques were used;    
automated exposure control, adjustment of the mA and/or KV according to patient    
size, use of     
iterative reconstruction technique.    
     
Findings:    
     
No focal liver or splenic lesions    
Mild splenomegaly    
No gallstones    
Edema involving the right kidney consistent with acute right, nephritis    
Aorta normal size    
There is inflammation medial to the cecum, the appendix is not third visualized    
Perianal small fluid collection    
     
There is acute right pyelonephritis    
There is diffuse colitis and proctitis    
There is a right perianal abscess 3 x 4 cm    
     
Impression:    
     
Inflammation medial to the cecum    
     
Right pyelonephritis    
     
Right perianal abscess    
     
    
Dictated By:    
Gurinder Oleary MD    
Signed By:    
<Electronically signed by Gurinder Oleary MD in OV>    
03/08/25 1421    
DD/DT: 03/08/25 1417    
    
TD/TT: 03/08/25 1417    
: JOHANN    
    
Medications    
Medications considered but not ordered::     
none    
Medication administrations::     
    
                        Medication Administration History    
    
Acetaminophen (Acetaminophen 325 Mg Tablet)  650 mg PO Q6H PRN    
   PRN Reason: Fever >101.5    
   Stop: 04/07/25 14:29    
Sodium Chloride (Ns)  1,000 mls @ 125 mls/hr IV .Q8H ONE    
   Stop: 03/08/25 22:09    
   Last Admin: 03/08/25 14:14  Dose: 125 mls/hr    
   Documented By: GM    
Piperacillin Sod/Tazobactam (Sod 3.375 gm/ Sodium Chloride)  50 mls @ 12.5   
mls/hr IV Q8HR [facility]    
   Stop: 03/15/25 21:59    
Oxycodone/Acetaminophen (Oxycodone/Apap 5/325 Tablet)  1 tab PO Q6H PRN    
   PRN Reason: PAIN SCALE 4-6 (Moderate    
   Stop: 03/13/25 14:29    
    
Discontinued Medications    
    
Sodium Chloride (Ns)  250 mls @ 999 mls/hr IV .Q16M ONE    
   Stop: 03/08/25 08:16    
   Last Admin: 03/08/25 08:36 Dose:  Not Given    
   Documented By: GM    
   Non-Admin Reason: Cancelled by Provider    
Sodium Chloride (Ns)  1,000 mls @ 999 mls/hr IV .Q1H1M ONE    
   Stop: 03/08/25 09:37    
   Last Infusion: 03/08/25 09:47  Dose: Infused    
   Documented By:     
   Admin: 03/08/25 08:39  Dose: 999 mls/hr    
   Documented By: GM    
Piperacillin Sod/Tazobactam (Sod 3.375 gm/ Sodium Chloride)  50 mls @ 100 mls/hr  
 IV X1 ONE    
   Stop: 03/08/25 14:08    
   Last Infusion: 03/08/25 14:44  Dose: Infused    
   Documented By:     
   Admin: 03/08/25 14:07  Dose: 100 mls/hr    
   Documented By: GM    
Sodium Chloride (Ns)  1,000 mls @ 75 mls/hr IV .[id number] [facility]    
   Stop: 03/09/25 03:54    
   Last Admin: 03/08/25 14:44 Dose:  Not Given    
   Documented By: GM    
   Non-Admin Reason: Cancelled by Provider    
Piperacillin Sod/Tazobactam (Sod 3.375 gm/ Sodium Chloride)  50 mls @ 100 mls/hr  
 IV Q6HR [facility]    
   Stop: 03/15/25 14:41    
   Last Admin: 03/08/25 14:44 Dose:  Not Given    
   Documented By: GM    
   Non-Admin Reason: Duplicate Medication on eMAR    
Mesalamine (Mesalamine 250 Mg Capsule.Er)  500 mg PO X1 ONE    
   Stop: 03/08/25 16:55    
Metoclopramide HCl (Metoclopramide Inj 5 Mg/Ml Vial 2 Ml)  10 mg IVP X1 ONE;   
Protocol    
   Stop: 03/08/25 08:02    
   Last Admin: 03/08/25 08:30  Dose: 10 mg    
   Documented By: EMBER    
Metoclopramide HCl (Metoclopramide Inj 5 Mg/Ml Vial 2 Ml)  10 mg IVP X1 ONE;   
Protocol    
   Stop: 03/08/25 13:37    
   Last Admin: 03/08/25 14:03  Dose: 10 mg    
   Documented By:     
    
    
see above    
Consultations    
Consultation(s) initiated? (list below): Yes    
Consultation #1 (Physician, Specialty, Details):     
Discussed test HPI, PMHx, lab, radiology results and/or management with Dr. Villagomez. Will consult an admission to the hospitalist.     
Time: 13:39    
Consultation #2 (Physician, Specialty, Details):     
Discussed test HPI, PMHx, lab, radiology results and/or management with   
hospitalist. Will admit for further evaluation and management. Accepts patient   
for admission.    
Time: 13:42    
Consultation #3 (Physician, Specialty, Details):     
1457: Discussed abdomen/pelvis CT radiology results Dr. Guerrier. He does not think   
it is acute appendicitis; however, there is right pyelonephritis.     
    
14:59: Discussed with Dr. Villagomez about discussion with Dr. Guerrier. There is right   
pyelonephritis but not acute appendicitis.     
Diagnosis    
Differential Diagnosis ED Complaint MDM: Crohn's disease, gastritis,   
pancreatitis, gallbladder disease    
Most likely diagnosis given after review of the tests above::     
Perianal abscess    
Perianal fistula    
Intractable hiccups    
Admission Indicated    
Admission indicated?: indicated    
Explain why admission is indicated or not indicated::     
Diagnoses meet admission criteria.    
Admission Request    
Was there a request for admission?: Yes    
Admission Attestation    
Admission request attestation:     
Discussed case with [] from Hospitalist service regarding admission.  Discussed   
patients ED course, exam findings, labs, and radiology results.  The Hospitalist  
 [agrees,declines] to accept the patient for admission.    
Disposition Plan    
Disposition Plan: Admit    
    
Medical Decision Making    
MDM Narrative    
MDM Narrative:     
I, Stephanie Leon, pablo scribing for and in the presence of Dr. Moses.    
Differential Diagnosis    
Differential Diagnosis: Crohn's disease, gastritis, pancreatitis, gallbladder   
disease    
Lab Data    
    
                                                        03/08/25 08:20              
    
                                                        03/08/25 08:20              
    
Labs:     
    
                                   Lab Results    
    
    
    
  03/08/25 03/08/25 Range/Units    
    
  08:20 08:39     
     
WBC  5.7   (3.8-10.6)  Thou/mm3    
     
RBC  4.94   (4.50-5.90)  Miln/mm3    
     
Hgb  10.1 L   (13.5-16.0)  g/dL    
     
Hct  33.1 L   (41.0-53.0)  %    
     
MCV  67 L   ()  fL    
     
MCH  20.4 L   (25.0-35.0)  pg    
     
MCHC  30.5 L   (31.0-37.0)  g/dl    
     
RDW Std Deviation  42.3   (35.1-43.9)  fL    
     
Plt Count  352   (140-440)  Thou/mm3    
     
Neut % (Auto)  67   (37-80)  %    
     
Lymph % (Auto)  25   (10-50)  %    
     
Mono % (Auto)  7   (0-12)  %    
     
Eos % (Auto)  1   (0-10)  %    
     
Baso % (Auto)  0   (0-2.5)  %    
     
Neut # (Auto)  3.8   (1.8-7.7)  Thou/mm3    
     
Lymph # (Auto)  1.4   (1.0-4.8)  Thou/mm3    
     
Mono # (Auto)  0.4   (0.0-0.8)  Thou/mm3    
     
Eos # (Auto)  0.0   (0.0-0.5)  Thou/mm3    
     
Baso # (Auto)  0.0   (0.0-0.2)  Thou/mm3    
     
Immature Gran # (Auto)  0.02 H   (0.00-0.00)  Thou/mm3    
     
Absolute Nucleated RBC  0.00   (0.00-0.00)  Thou/mm3    
     
Immature Gran %  0   (0-0)  %    
     
Nucleated RBC %  0   (0)  /100 WBC    
     
Smear Path Review  Sent to Pathologist       
     
Sodium  139   (136-145)  mMol/L    
     
Potassium  3.3 L   (3.4-5.1)  mMol/L    
     
Chloride  104   ()  mMol/L    
     
Carbon Dioxide  27.2   (20.0-31.0)  mMol/L    
     
Anion Gap  8   (7-16)      
     
BUN  10   (9-23)  mg/dL    
     
Creatinine  0.7   (0.6-1.3)  mg/dL    
     
Estim Creat Clear Calc  101.6   (>60)  mL/min    
     
eGFR  > 60  (60 - ) See Note    
     
BUN/Creatinine Ratio  14   (12-20)  Ratio    
     
Glucose  103   ()  mg/dL    
     
Calculated Osmolality  276   (275-295)      
     
Calcium  8.8   (8.3-10.6)  mg/dL    
     
Corrected Calcium  9.1   (8.5-10.1)  mg/dL    
     
Magnesium  2.2   (1.6-2.6)  mg/dL    
     
Total Bilirubin  0.6   (0.3-1.2)  mg/dL    
     
AST  < 10   (0-34)  U/L    
     
ALT  < 7 L   (10-49)  U/L    
     
Alkaline Phosphatase  64   ()  U/L    
     
Total Protein  6.6   (5.7-8.2)  gm/dL    
     
Albumin  3.6   (3.5-5.0)  gm/dL    
     
Globulin  3.0   (2.3-3.5)  gm/dL    
     
Albumin/Globulin Ratio  1.2   (1.2-2.2)      
     
Lipase  26   (12-53)  U/L    
     
Ur Collection Type   Clean Catch      
     
Urine Color   Yellow  (Lt Yel-Yel)      
     
Urine Clarity   Turbid A  (Clear/Hazy)      
     
Urine pH   6.5  (5.0-7.0)      
     
Ur Specific Gravity   1.017  (1.001-1.035)      
     
Urine Protein   1+ A  (Neg - Trace)      
     
Urine Glucose (UA)   Negative  (Negative)      
     
Urine Ketones   Negative  (Negative)      
     
Urine Blood   1+ A  (Negative)      
     
Urine Nitrite   Negative  (Negative)      
     
Urine Bilirubin   Negative  (Negative)      
     
Urine Urobilinogen (Auto)   Negative  (0.0-1.0)  mg/dL    
     
Ur Leukocyte Esterase   Positive  (Negative)      
     
Urine RBC   43 H  (0-3)  /hpf    
     
Urine WBC   285 H  (0-5)  /hpf    
     
Ur Squamous Epith Cells   1  (0-5)  /hpf    
     
Ur Transition Epith Cell   2  (0-5)  /hpf    
     
Urine Bacteria   None  (None)      
     
Ur Culture Indicated?   Yes      
    
    
    
    
    
Discharge Plan    
Plan    
Patient Disposition: Admit Acute Care w/in Hospital    
    
Problem List    
Clinical Impression:    
 Perianal fistula, Perianal abscess, Intractable hiccups, Pyelonephritis of   
right kidney

## 2025-03-08 NOTE — PD.EDADULT
ED General RME/HPI
General
Chief complaint: Extremity Problem,Nontraumatic
Stated complaint: Extremity pain and bruising without trauma
Time Seen by Provider: 03/08/25 07:30
Arrival date/time: 
03/08/25 07:28

RME / HPI
RME / HPI narrative: 
03/08/25 07:28
49-year-old male with medical history significant for Crohn's disease as well as anal fistula currently followed by specialist in [place] presents to the emergency department today complaints of nausea vomiting abdominal pain as well as hiccups.

DR. MOSES   MAIN ED EVALUATION:

49 year old male with past medical history significant for Crohn's disease and anal fistula currently followed by a specialist in [place] presents to the Emergency Department with complaints of intermittent hiccups onset 1 week. Associated 
nausea and vomiting, especially after hiccups episode. No diarrhea. 

Related Data
Home Medications

?Medication ?Instructions ?Recorded ?Confirmed
SALOFALK 500 mg PO 08/01/24 01/13/25
diphenoxylate-atropine 2.5 See Rx Instructions .Route 08/21/24 01/13/25
mg-0.025 mg tablet .COMPLEX PRN Diarrhea  
mesalamine 500 mg capsule,extended 1,000 mg PO BID 08/28/24 01/13/25
release (Pentasa)   

Previous Rx's

?Medication ?Instructions ?Recorded
ibuprofen 600 mg tablet 600 mg PO Q6H PRN pain #30 tabs 01/11/24
ciprofloxacin HCl 500 mg tablet 500 mg PO BID #14 tabs 12/21/24
(Cipro)  


Allergies

Allergy/AdvReac Type Severity Reaction Status Date / Time
sulfamethizole Allergy Mild Rash Verified 01/13/25 14:59



Review of Systems
Review of Systems
Systems Reviewed: All systems reviewed, normal except as documented
Narrative Review of Systems: 
GEN: No fever, no chills, no weight loss, + intermittent hiccups (see HPI)
EYES: No discharge, no visual changes, no pain
HEENT: No ear pain, no congestion, no sore throat
PULM: No shortness of breath, no cough, no congestion
CV: No chest pain, no dyspnea on exertion, no palpitations
GI: + nausea, + vomiting, no diarrhea, no pain, no constipation
: No frequency, no urgency and no dysuria
MUSC/SKEL: No joint pain, no back pain
SKIN: No rash
PSYCH: No hallucinations, no depression
HEME/LYMPH: No easy bleeding or bruising tendencies
NEURO: No weakness, no headache

Past Medical History
Past Medical History
GASTROINTESTINAL: Positive Crohn's Disease
Surgical History
OTHER SURGICAL HX: ANAL FISTULA SURGERY
Social History
SMOKING STATUS: Never smoker
SUBSTANCE USE: does not use
ALCOHOL: Never

ED Exam
Narrative
Physical exam: 
GENERAL APPEARANCE:  alert and oriented x 4, well-developed, well-nourished, no acute distress
VITALS: All vitals were reviewed and the pulse ox is 100% on room air, which is normal according to my interpretation.
HEENT: Normocephalic, atraumatic; pupils equal, round, reactive to light; EOMI; mucous membranes pink, moist; oropharynx clear
NECK: Supple
LUNGS: CTABL; no wheezes, no rales, no rhonchi
HEART: Regular rate, regular rhythm; normal S1, S2; no murmurs
ABDOMEN: non distended; normal BS;  soft, no tenderness, no guarding, no rebound; no masses, no organomegaly, no hernia  
BACK:  no CVA tenderness
EXTREMITIES:  atraumatic; no edema
NEUROLOGIC: awake; alert and oriented x4; cranial nerves II-XII grossly intact; no focal sensory or motor deficits
PSYCHIATRIC:  appropriate mood and affect
SKIN: warm, dry, normal color; no rashes


Course
Quality Measures
none
Orders



 Category Date Time Status
 CT Screening NOW Care  03/08/25 09:20 Active
 Insert IV NOW Care  03/08/25 08:01 Active
 NPO NOW Care  03/08/25 13:40 Active
 Diet NPO (NOW) Diet  03/08/25 13:40 Completed
 CT abdomen pelvis w con Stat Exams  03/08/25 09:20 Completed
 CBC Stat Lab  03/08/25 08:20 Completed
 Comprehensive Metabolic Panel Stat Lab  03/08/25 08:20 Completed
 Lipase Stat Lab  03/08/25 08:20 Completed
 Mag [Magnesium] Stat Lab  03/08/25 08:20 Completed
 Path Review Blood Smear Stat Lab  03/08/25 08:20 Completed
 UA, C/S IF [Urinalysis, C/S if Indicated] Stat Lab  03/08/25 08:39 Completed
 Urine Culture Stat Lab  03/08/25 08:39 Received
 Metoclopramide Inj [Reglan Inj] Med  03/08/25 08:01 Discontinued
 10 mg IVP X1 ONE   
 Metoclopramide Inj [Reglan Inj] Med  03/08/25 13:36 Discontinued
 10 mg IVP X1 ONE   
 Piper/Tazo Inj [Zosyn Inj] 3.375 gm Med  03/08/25 13:39 Discontinued
 SODIUM CHLORIDE 0.9% (Popper) [Ns 0.9% (P)] 50 ml   
 IV X1   
 Sodium Chloride 0.9% 1000 ml [Ns] 1,000 ml Med  03/08/25 14:10 Active
  mls/hr   
 Sodium Chloride 0.9% 1000 ml [Ns] 1,000 ml Med  03/08/25 08:37 Discontinued
  mls/hr   
 Sodium Chloride 0.9% 250 ml [Ns] 250 ml Med  03/08/25 08:01 Discontinued
  mls/hr   



Reevaluation(s)
Reevaluation #1: 
Re-assessment at the time of disposition demonstrates that the patient is still having hiccups. No other complaints at this time. Ordered more reglan for him and waiting on Dr. Villagomez to call back, I left a message. 
Time: 13:32
Vital Signs
Vital signs: 

Vital Signs

Temperature  98.2 F   03/08/25 07:48
Pulse Rate  93   03/08/25 07:48
Respiratory Rate  18   03/08/25 07:48
Blood Pressure  110/76   03/08/25 07:48
Pulse Oximetry (%)  97   03/08/25 07:48
Oxygen Delivery Method  Room Air   03/08/25 07:48




[facility]
Patient data
External records reviewed:: [facility] previous records (Reviewed surgical follow-up note by Dr. Ritchie dated 01/13/25.)
Clinical information provided by:: patient
Social determinants that could affect healthcare access:: none
Patient has the following chronic illnesses:: 
Crohn's disease and anal fistula currently followed by a specialist in [place]
How is presenting disease/condition affected by chronic disease/condition?: exacerbated by
Evaluation data
The following diagnostics were reviewed and interpreted by me:: lab results and radiology exam(s)
Lab and/or radiology exams considered but not ordered:: 
none
Interpretation Summary: 
[facility]
[address] [place] [address]

[facility] Preliminary Report 
Draft
Patient: MANJU SABA
Med. Record#: [id number]
YOB: 1975
Account#: [id number]
Age/Sex: 49 / M
Location: [facility]
Attending Dr: 
Ordering Physician: 
Date of Service: 
Procedure(s): 
Accession Number(s): 

cc: ~


CT scan of the abdomen and pelvis with intravenous contrast (axial sections with sagittal and coronal reformats) March 08, 2025 1124 hours

Clinical History: Epigastric pain

Comparison: No prior study is available for comparison. 

Findings: 
The lung bases are clear.
The liver, gallbladder, pancreas, spleen and adrenals are unremarkable. There is a right renal cyst, measuring 1 cm.
No evidence of bowel obstruction. There is mild thickening of the caecum, descending, sigmoid and transverse colon with associated fat stranding and mucosal enhancement. The appendix is thickened, measuring 23 mm in caliber with mural enhancement 
and periappendiceal fat stranding. There is no mesenteric or retroperitoneal adenopathy.
The urinary bladder is incompletely distended at the time of the examination and appears mildly thick-walled.  
There is rectal wall thickening with perirectal fat stranding. There is a 4 x 5 cm peripherally enhancing fluid collection with surrounding fat stranding in the right perianal region, consistent with an abscess, with fat stranding extending to the 
right gluteal fold. On the background of edema, a fistula is differentiated, with exit to the skin of the perianal area on the right side. There is no free fluid or free air.
The osseous structures are unremarkable.

Impression:
1. Acute appendicitis without evidence of free air or abscess.
2. A 4 x 5 cm peripherally enhancing fluid collection with surrounding fat stranding in the right perianal region, consistent with an abscess, with fat stranding extending to the right gluteal fold. Fistula of the perianal area. 
3. Thickening of the caecum, descending, sigmoid and transverse colon with associated fat stranding and mucosal enhancement, suggestive of colitis of inflammatory or infectious etiology.


Discussion Details: 
 Results verbally communicated to : Dr. Moses  at 01:18 PM 03/08/2025
 Report Electronically Signed By: Demetris Law 3/8/2025 1:27:32 PM [EST]

Dictated By:

Signed By:

DD/DT: 03/08/25 1213

TD/TT: 03/08/25 0314
: 



-------------------------------------------------------------------------------------------------------------------------------------------

[facility]
[address]
[place] [address]
[facility] [facility] Report 
Signed
Patient: MANJU SABA Record#: [id number]
YOB: 1975
Account#: [id number]
Age/Sex: 49 / M
Location: [facility]
Attending Dr: 

Ordering Physician: Sruthi Moses MD
Date of Service: 03/08/25
Procedure(s): CT abdomen pelvis w con
Accession Number(s): [id number]

cc: Trent Daniels MD; Gurinder Oleary MD; Srtuhi Moses MD~

Examination:
 
CT abdomen with intravenous contrast
CT pelvis with intravenous contrast
2-D coronal reconstructions
2-D sagittal reconstructions
 
Date and time of exam:March 8, 2025 1130 hrs.
 
Indications: Onset abdominal pain epigastric pain today.
 
CTDI: vol (mGy) 4.53
DLP: (mGycm) 240 
 
Technique: 
Multiple axial sections of the abdomen and pelvis have been obtained.
64 slice high-resolution scanner used.
3 mm axial sections have been obtained, post intravenous injection 100 cc
[id number] 
2-D sagittal, coronal reconstructions obtained.
 
Low dose protocols were performed.  One or more of the following dose reduction
techniques were used;
automated exposure control, adjustment of the mA and/or KV according to patient
size, use of 
iterative reconstruction technique.
 
Findings:
 
No focal liver or splenic lesions
Mild splenomegaly
No gallstones
Edema involving the right kidney consistent with acute right, nephritis
Aorta normal size
There is inflammation medial to the cecum, the appendix is not third visualized
Perianal small fluid collection
 
There is acute right pyelonephritis
There is diffuse colitis and proctitis
There is a right perianal abscess 3 x 4 cm
 
Impression:
 
Inflammation medial to the cecum
 
Right pyelonephritis
 
Right perianal abscess
 

Dictated By:
Gurinder Oleary MD
Signed By:
<Electronically signed by Gurinder Oleary MD in [place]>
03/08/25 1421
DD/DT: 03/08/25 1417

TD/TT: 03/08/25 1417
: JOHANN

Medications
Medications considered but not ordered:: 
none
Medication administrations:: 

Medication Administration History

Acetaminophen (Acetaminophen 325 Mg Tablet)  650 mg PO Q6H PRN
 PRN Reason: Fever >101.5
 Stop: 04/07/25 14:29
Sodium Chloride (Ns)  1,000 mls @ 125 mls/hr IV .Q8H ONE
 Stop: 03/08/25 22:09
 Last Admin: 03/08/25 14:14  Dose: 125 mls/hr
 Documented By: GM
Piperacillin Sod/Tazobactam (Sod 3.375 gm/ Sodium Chloride)  50 mls @ 12.5 mls/hr IV Q8HR [facility]
 Stop: 03/15/25 21:59
Oxycodone/Acetaminophen (Oxycodone/Apap 5/325 Tablet)  1 tab PO Q6H PRN
 PRN Reason: PAIN SCALE 4-6 (Moderate
 Stop: 03/13/25 14:29

Discontinued Medications

Sodium Chloride (Ns)  250 mls @ 999 mls/hr IV .Q16M ONE
 Stop: 03/08/25 08:16
 Last Admin: 03/08/25 08:36 Dose:  Not Given
 Documented By: 
 Non-Admin Reason: Cancelled by Provider
Sodium Chloride (Ns)  1,000 mls @ 999 mls/hr IV .Q1H1M ONE
 Stop: 03/08/25 09:37
 Last Infusion: 03/08/25 09:47  Dose: Infused
 Documented By: 
 Admin: 03/08/25 08:39  Dose: 999 mls/hr
 Documented By: 
Piperacillin Sod/Tazobactam (Sod 3.375 gm/ Sodium Chloride)  50 mls @ 100 mls/hr IV X1 ONE
 Stop: 03/08/25 14:08
 Last Infusion: 03/08/25 14:44  Dose: Infused
 Documented By: 
 Admin: 03/08/25 14:07  Dose: 100 mls/hr
 Documented By: 
Sodium Chloride (Ns)  1,000 mls @ 75 mls/hr IV .[id number] [facility]
 Stop: 03/09/25 03:54
 Last Admin: 03/08/25 14:44 Dose:  Not Given
 Documented By: GM
 Non-Admin Reason: Cancelled by Provider
Piperacillin Sod/Tazobactam (Sod 3.375 gm/ Sodium Chloride)  50 mls @ 100 mls/hr IV Q6HR [facility]
 Stop: 03/15/25 14:41
 Last Admin: 03/08/25 14:44 Dose:  Not Given
 Documented By: GM
 Non-Admin Reason: Duplicate Medication on eMAR
Mesalamine (Mesalamine 250 Mg Capsule.Er)  500 mg PO X1 ONE
 Stop: 03/08/25 16:55
Metoclopramide HCl (Metoclopramide Inj 5 Mg/Ml Vial 2 Ml)  10 mg IVP X1 ONE; Protocol
 Stop: 03/08/25 08:02
 Last Admin: 03/08/25 08:30  Dose: 10 mg
 Documented By: GM
Metoclopramide HCl (Metoclopramide Inj 5 Mg/Ml Vial 2 Ml)  10 mg IVP X1 ONE; Protocol
 Stop: 03/08/25 13:37
 Last Admin: 03/08/25 14:03  Dose: 10 mg
 Documented By: 


see above
Consultations
Consultation(s) initiated? (list below): Yes
Consultation #1 (Physician, Specialty, Details): 
Discussed test HPI, PMHx, lab, radiology results and/or management with Dr. Villagomez. Will consult an admission to the hospitalist. 
Time: 13:39
Consultation #2 (Physician, Specialty, Details): 
Discussed test HPI, PMHx, lab, radiology results and/or management with hospitalist. Will admit for further evaluation and management. Accepts patient for admission.
Time: 13:42
Consultation #3 (Physician, Specialty, Details): 
1457: Discussed abdomen/pelvis CT radiology results Dr. Guerrier. He does not think it is acute appendicitis; however, there is right pyelonephritis. 

14:59: Discussed with Dr. Villagomez about discussion with Dr. Guerrier. There is right pyelonephritis but not acute appendicitis. 
Diagnosis
Differential Diagnosis ED Complaint MDM: Crohn's disease, gastritis, pancreatitis, gallbladder disease
Most likely diagnosis given after review of the tests above:: 
Perianal abscess
Perianal fistula
Intractable hiccups
Admission Indicated
Admission indicated?: indicated
Explain why admission is indicated or not indicated:: 
Diagnoses meet admission criteria.
Admission Request
Was there a request for admission?: Yes
Admission Attestation
Admission request attestation: 
Discussed case with [] from Hospitalist service regarding admission.  Discussed patients ED course, exam findings, labs, and radiology results.  The Hospitalist [agrees,declines] to accept the patient for admission.
Disposition Plan
Disposition Plan: Admit

Medical Decision Making
MDM Narrative
MDM Narrative: 
I, Stephanie Partida am scribing for and in the presence of Dr. Moses.
Differential Diagnosis
Differential Diagnosis: Crohn's disease, gastritis, pancreatitis, gallbladder disease
Lab Data

03/08/25 08:20          

03/08/25 08:20          

Labs: 

Lab Results

  03/08/25 03/08/25 Range/Units
  08:20 08:39 
WBC  5.7   (3.8-10.6)  Thou/mm3
RBC  4.94   (4.50-5.90)  Miln/mm3
Hgb  10.1 L   (13.5-16.0)  g/dL
Hct  33.1 L   (41.0-53.0)  %
MCV  67 L   ()  fL
MCH  20.4 L   (25.0-35.0)  pg
MCHC  30.5 L   (31.0-37.0)  g/dl
RDW Std Deviation  42.3   (35.1-43.9)  fL
Plt Count  352   (140-440)  Thou/mm3
Neut % (Auto)  67   (37-80)  %
Lymph % (Auto)  25   (10-50)  %
Mono % (Auto)  7   (0-12)  %
Eos % (Auto)  1   (0-10)  %
Baso % (Auto)  0   (0-2.5)  %
Neut # (Auto)  3.8   (1.8-7.7)  Thou/mm3
Lymph # (Auto)  1.4   (1.0-4.8)  Thou/mm3
Mono # (Auto)  0.4   (0.0-0.8)  Thou/mm3
Eos # (Auto)  0.0   (0.0-0.5)  Thou/mm3
Baso # (Auto)  0.0   (0.0-0.2)  Thou/mm3
Immature Gran # (Auto)  0.02 H   (0.00-0.00)  Thou/mm3
Absolute Nucleated RBC  0.00   (0.00-0.00)  Thou/mm3
Immature Gran %  0   (0-0)  %
Nucleated RBC %  0   (0)  /100 WBC
Smear Path Review  Sent to Pathologist   
Sodium  139   (136-145)  mMol/L
Potassium  3.3 L   (3.4-5.1)  mMol/L
Chloride  104   ()  mMol/L
Carbon Dioxide  27.2   (20.0-31.0)  mMol/L
Anion Gap  8   (7-16)  
BUN  10   (9-23)  mg/dL
Creatinine  0.7   (0.6-1.3)  mg/dL
Estim Creat Clear Calc  101.6   (>60)  mL/min
eGFR  > 60  (60 - ) See Note
BUN/Creatinine Ratio  14   (12-20)  Ratio
Glucose  103   ()  mg/dL
Calculated Osmolality  276   (275-295)  
Calcium  8.8   (8.3-10.6)  mg/dL
Corrected Calcium  9.1   (8.5-10.1)  mg/dL
Magnesium  2.2   (1.6-2.6)  mg/dL
Total Bilirubin  0.6   (0.3-1.2)  mg/dL
AST  < 10   (0-34)  U/L
ALT  < 7 L   (10-49)  U/L
Alkaline Phosphatase  64   ()  U/L
Total Protein  6.6   (5.7-8.2)  gm/dL
Albumin  3.6   (3.5-5.0)  gm/dL
Globulin  3.0   (2.3-3.5)  gm/dL
Albumin/Globulin Ratio  1.2   (1.2-2.2)  
Lipase  26   (12-53)  U/L
Ur Collection Type   Clean Catch  
Urine Color   Yellow  (Lt Yel-Yel)  
Urine Clarity   Turbid A  (Clear/Hazy)  
Urine pH   6.5  (5.0-7.0)  
Ur Specific Gravity   1.017  (1.001-1.035)  
Urine Protein   1+ A  (Neg - Trace)  
Urine Glucose (UA)   Negative  (Negative)  
Urine Ketones   Negative  (Negative)  
Urine Blood   1+ A  (Negative)  
Urine Nitrite   Negative  (Negative)  
Urine Bilirubin   Negative  (Negative)  
Urine Urobilinogen (Auto)   Negative  (0.0-1.0)  mg/dL
Ur Leukocyte Esterase   Positive  (Negative)  
Urine RBC   43 H  (0-3)  /hpf
Urine WBC   285 H  (0-5)  /hpf
Ur Squamous Epith Cells   1  (0-5)  /hpf
Ur Transition Epith Cell   2  (0-5)  /hpf
Urine Bacteria   None  (None)  
Ur Culture Indicated?   Yes  




Discharge Plan
Plan
Patient Disposition: Admit Acute Care w/in Hospital

Problem List
Clinical Impression:
 Perianal fistula, Perianal abscess, Intractable hiccups, Pyelonephritis of right kidney

## 2025-03-08 NOTE — PC.NURSE
CALLED AND SPOKE TO DR. BENITEZ VIA PHONE TO CLARIFY IF DR. BENITEZ WILL BE PLACING PRN MEDICATION ORDERS FOR PT; PER DR. BENITEZ,  WILL PUT THEM IN.

## 2025-03-09 VITALS
TEMPERATURE: 99.68 F | HEART RATE: 84 BPM | SYSTOLIC BLOOD PRESSURE: 106 MMHG | DIASTOLIC BLOOD PRESSURE: 67 MMHG | RESPIRATION RATE: 18 BRPM | OXYGEN SATURATION: 96 %

## 2025-03-09 VITALS
DIASTOLIC BLOOD PRESSURE: 67 MMHG | TEMPERATURE: 97.9 F | OXYGEN SATURATION: 95 % | HEART RATE: 70 BPM | SYSTOLIC BLOOD PRESSURE: 91 MMHG | RESPIRATION RATE: 17 BRPM

## 2025-03-09 VITALS
DIASTOLIC BLOOD PRESSURE: 61 MMHG | TEMPERATURE: 98.3 F | OXYGEN SATURATION: 97 % | HEART RATE: 71 BPM | RESPIRATION RATE: 17 BRPM | SYSTOLIC BLOOD PRESSURE: 92 MMHG

## 2025-03-09 VITALS
DIASTOLIC BLOOD PRESSURE: 66 MMHG | TEMPERATURE: 98.4 F | OXYGEN SATURATION: 97 % | RESPIRATION RATE: 17 BRPM | SYSTOLIC BLOOD PRESSURE: 101 MMHG | HEART RATE: 66 BPM

## 2025-03-09 VITALS
SYSTOLIC BLOOD PRESSURE: 108 MMHG | TEMPERATURE: 99.14 F | RESPIRATION RATE: 18 BRPM | HEART RATE: 70 BPM | OXYGEN SATURATION: 96 % | DIASTOLIC BLOOD PRESSURE: 63 MMHG

## 2025-03-09 VITALS
SYSTOLIC BLOOD PRESSURE: 123 MMHG | RESPIRATION RATE: 19 BRPM | OXYGEN SATURATION: 99 % | TEMPERATURE: 99.3 F | DIASTOLIC BLOOD PRESSURE: 78 MMHG | HEART RATE: 79 BPM

## 2025-03-09 VITALS — HEART RATE: 62 BPM | RESPIRATION RATE: 16 BRPM

## 2025-03-09 LAB
ALANINE AMINOTRANSFERASE: < 7 U/L (ref 10–49)
ALBUMIN, SERUM: 3 GM/DL (ref 3.5–5)
ALBUMIN/GLOBULIN RATIO: 1.3 (ref 1.2–2.2)
ALKALINE PHOSPHATASE: 51 U/L (ref 46–116)
ANION GAP: 8 (ref 7–16)
ASPARTATE AMINO TRANSFERASE: < 8 U/L (ref 0–34)
BASOPHILS # (AUTO): 0 THOU/MM3 (ref 0–0.2)
BASOPHILS % (AUTO): 1 % (ref 0–2.5)
BILIRUBIN,TOTAL: 0.4 MG/DL (ref 0.3–1.2)
BLOOD UREA NITROGEN: 6 MG/DL (ref 9–23)
BUN/CREATININE RATIO: 9 RATIO (ref 12–20)
CALCIUM (CORRECTED): 8.6 MG/DL (ref 8.5–10.1)
CALCIUM: 7.8 MG/DL (ref 8.3–10.6)
CARBON DIOXIDE: 23.9 MMOL/L (ref 20–31)
CHLORIDE: 107 MMOL/L (ref 98–107)
CREAT CL PREDICTED SERPL C-G-VRATE: 97.1 ML/MIN (ref 60–?)
CREATININE (COMPONENT): 0.7 MG/DL (ref 0.6–1.3)
EGFR: > 60 SEE NOTE
EOSINOPHILS # (AUTO): 0 THOU/MM3 (ref 0–0.5)
EOSINOPHILS % (AUTO): 1 % (ref 0–10)
FERRITIN: 344 NG/ML (ref 10.5–307.3)
GLOBULIN: 2.3 GM/DL (ref 2.3–3.5)
GLUCOSE: 108 MG/DL (ref 74–106)
HEMATOCRIT: 28.1 % (ref 41–53)
HEMOGLOBIN: 8.6 G/DL (ref 13.5–16)
IMM GRANULOCYTES # BLD AUTO: 0.01 THOU/MM3 (ref 0–0)
IMMATURE GRANULOCYTES % (AUTO): 0 % (ref 0–0)
IRON SATN MFR SERPL: 3 % (ref 20–55)
IRON: 6 MCG/DL (ref 65–175)
LYMPHOCYTES # (AUTO): 1.3 THOU/MM3 (ref 1–4.8)
LYMPHOCYTES % (AUTO): 24 % (ref 10–50)
MAGNESIUM: 1.9 MG/DL (ref 1.6–2.6)
MEAN CORPUSCULAR HEMOGLOBIN: 20.6 PG (ref 25–35)
MEAN CORPUSCULAR HGB CONC: 30.6 G/DL (ref 31–37)
MEAN CORPUSCULAR VOLUME: 67 FL (ref 80–100)
MONOCYTES # (AUTO): 0.5 THOU/MM3 (ref 0–0.8)
MONOCYTES % (AUTO): 9 % (ref 0–12)
NEUTROPHILS # (AUTO): 3.5 THOU/MM3 (ref 1.8–7.7)
NEUTROPHILS % (AUTO): 65 % (ref 37–80)
NRBC BLD-RTO: 0 /100 WBC
NUCLEATED RED BLOOD CELL #: 0 THOU/MM3 (ref 0–0)
OSMOLALITY,CALCULATED: 276 (ref 275–295)
PHOSPHOROUS: 3.3 MG/DL (ref 2.4–5.1)
PLATELET COUNT: 279 THOU/MM3 (ref 140–440)
POTASSIUM: 3.6 MMOL/L (ref 3.4–5.1)
RDW STANDARD DEVIATION: 42.4 FL (ref 35.1–43.9)
RED BLOOD COUNT: 4.17 MILN/MM3 (ref 4.5–5.9)
SODIUM: 139 MMOL/L (ref 136–145)
TOTAL IRON BINDING CAPACITY: 161 MCG/DL (ref 250–425)
TOTAL PROTEIN: 5.3 GM/DL (ref 5.7–8.2)
UNSATURATED IRON BINDING: 155 (ref 225–295)
WHITE BLOOD COUNT: 5.3 THOU/MM3 (ref 3.8–10.6)

## 2025-03-09 RX ADMIN — MESALAMINE 1000 MG: 250 CAPSULE ORAL at 21:50

## 2025-03-09 NOTE — ESPR_ITS
Documentation for date of:    
03/09/25    
    
    
Subjective    
Subjective    
Interval history:     
Patient seen and examined at bedside this morning.  No acute overnight events.    
Vitals stable, labs reviewed.  Hemoglobin with mild decrease, however no signs   
of bleed noted.  Possibly dilutional after IV fluid resuscitation.  Urine   
cultures pending.  Patient complains of hiccups which leads to worsened   
abdominal pain.  Will continue IV antibiotics at this time, with no plans for   
surgery as of now.  He is wondering when he will be discharged as he has a court  
case on Tuesday.  Will also resume home mesalamine.    
    
Exam    
Vital Signs    
                                            
    
    
    
Temp Pulse Resp BP Pulse Ox O2 Del Method    
     
 98.3 F   71   17   92/61   97   Room Air     
     
 03/09/25 12:00  03/09/25 12:00  03/09/25 12:00  03/09/25 12:00  03/09/25 12:00 03/09/25 12:00    
    
    
    
Narrative Exam    
Gen: AAOx3, [place]-speaking male, pleasant to speak with and answers questions   
appropriately    
HEENT: NCAT, PERRLA, EOMI, MMM    
CVS: normal S1, S2. RRR. No MRG    
Resp: CTA B/L. No rhonchi, rales, crackles or wheezing    
Abd: soft, TTP in LLQ, no rebound tenderness, negative Rovsing sign, non-  
distended. BS+ in all 4 quadrants    
: Perianal fistula    
MSK: Good ROM in BUE & BLE. No edema or rash.    
Neuro: CN II-XII grossly intact.  No focal deficits noted    
    
Objective    
Labs    
    
                                                        03/09/25 05:53              
    
                                                        03/09/25 05:53              
    
Labs:     
                         Laboratory Results - last 24 hr    
    
    
    
  03/08/25 03/09/25    
    
  08:20 05:53    
     
WBC   5.3    
     
RBC   4.17 L    
     
Hgb   8.6 L    
     
Hct   28.1 L    
     
MCV   67 L    
     
MCH   20.6 L    
     
MCHC   30.6 L    
     
RDW Std Deviation   42.4    
     
Plt Count   279  D    
     
Neut % (Auto)   65    
     
Lymph % (Auto)   24    
     
Mono % (Auto)   9    
     
Eos % (Auto)   1    
     
Baso % (Auto)   1    
     
Neut # (Auto)   3.5    
     
Lymph # (Auto)   1.3    
     
Mono # (Auto)   0.5    
     
Eos # (Auto)   0.0    
     
Baso # (Auto)   0.0    
     
Immature Gran # (Auto)   0.01 H    
     
Absolute Nucleated RBC   0.00    
     
Immature Gran %   0    
     
Nucleated RBC %   0    
     
Smear Path Review  Sent to Pathologist     
     
Sodium   139    
     
Potassium   3.6    
     
Chloride   107    
     
Carbon Dioxide   23.9    
     
Anion Gap   8    
     
BUN   6 L    
     
Creatinine   0.7    
     
Estim Creat Clear Calc   97.1    
     
eGFR   > 60    
     
BUN/Creatinine Ratio   9 L    
     
Glucose   108 H    
     
Calculated Osmolality   276    
     
Calcium   7.8 L    
     
Corrected Calcium   8.6    
     
Phosphorus   3.3    
     
Magnesium   1.9    
     
Iron   6 L    
     
TIBC   161 L    
     
Iron Saturation   3 L    
     
Unsat Iron Binding   155 L    
     
Ferritin   344 H    
     
Total Bilirubin   0.4    
     
AST   < 8    
     
ALT   < 7 L    
     
Alkaline Phosphatase   51  D    
     
Total Protein   5.3 L    
     
Albumin   3.0 L D    
     
Globulin   2.3    
     
Albumin/Globulin Ratio   1.3    
    
    
    
    
Quality Measures    
Quality Measures    
VTE prophylaxis    
    
Assessment & Plan    
Assessment    
Current Active Medications:     
                                            
    
    
    
Generic Name Dose Route Start Last Admin    
    
  Trade Name Freq  PRN Reason Stop Dose Admin    
     
Acetaminophen  650 mg  03/09/25 11:16     
    
  Acetaminophen 325 Mg Tablet  PO  04/07/25 14:29     
    
  Q6H PRN      
    
  Fever >100.3 or pain      
     
Piperacillin Sod/Tazobactam  50 mls @ 12.5 mls/hr  03/08/25 22:00  03/09/25   
05:34    
    
  Sod 3.375 gm/ Sodium Chloride  IV  03/15/25 21:59  12.5 mls/hr    
    
  Q8HR LUCITA   Administration    
     
Ondansetron HCl  4 mg  03/08/25 19:47  03/09/25 05:33    
    
  Ondansetron Inj 2 Mg/Ml Inj 2 Ml  IV  04/07/25 19:46  4 mg    
    
  Q6HR PRN   Administration    
    
  NAUSEA OR VOMITING      
    
  Protocol      
     
Oxycodone/Acetaminophen  1 tab  03/08/25 14:30     
    
  Oxycodone/Apap 5/325 Tablet  PO  03/13/25 14:29     
    
  Q6H PRN      
    
  PAIN SCALE 4-6 (Moderate      
     
Prednisone  5 mg  03/09/25 09:00  03/09/25 07:50    
    
  Prednisone 5 Mg Tablet  PO  04/08/25 08:59  5 mg    
    
  QDAY LUCITA   Administration    
    
    
    
Plan    
Patient is a 49-year-old male with Crohn's disease (diagnosed July 2024) who   
presented to the ED with weakness, hiccups and was found to have a perianal   
fistula, with concern for pyelonephritis as patient had CVA tenderness and UA   
consistent with UTI.  Patient was started on IV antibiotics and IV fluids.    
General surgery was consulted however patient does not require I&D at this time.    
    
#Crohn's disease    
#Perianal fistula    
#Weakness    
#Abdominal pain    
Abscess as noted on exam, and seen on CT A/P (3 x 4 cm)    
Patient started on IV Zosyn    
Continue prednisone 5mg, home mesalamine at 1000mg twice daily    
Low fiber diet    
Gastroenterology consulted, appreciate recs    
General Surgery consulted: No need for I&D at this time    
Oxycodone for pain prn    
    
#?Acute pyelonephritis    
#UTI    
UA consistent with UTI; patient with CVA tenderness on exam; CT A/P also shows   
pyelonephritis    
Urine culture pending    
IV Zosyn to cover    
    
#Microcytic anemia    
Hgb 8.6, MCV 67    
No active bleed at this time.  Drop in hemoglobin appears to be dilutional after  
IV fluid resuscitation    
Iron panel consistent with iron deficiency anemia, likely from poor absorption   
in setting of Crohn's disease    
Patient may benefit from IV iron    
    
#Hiccups    
Patient endorses having intermittent hiccups, leading to worsening abdominal   
pain    
Can consider Thorazine if hiccups become intractable    
    
Dispo: Continue IV antibiotics; pending urine culture    
GI PPx: None    
DVT PPx: Lovenox    
Diet: Low fiber    
CODE STATUS: Full code    
    
Patient seen and care discussed with my attending Dr. Arrieta.    
Jose Hernandez MD PGY-3    
Attending Provider Attestation/Addendum    
I have discussed and was present for the essential components of the history,   
physical examination, diagnosis, and treatment plan with the resident. I agree   
with the patient's care as documented by the resident and amended herein by me.   
Enrike Arrieta DO.    
    
Although this document has been carefully reviewed, there may still be some   
phonetic and other typographical errors.  These errors are purely grammatical   
due to imperfections in the software program and should not be construed in any   
way to compromise the substance of the patient's medical care during this visit.

## 2025-03-09 NOTE — PD.IMCONS
HPI
Data of Consult
Requesting Physician: 
Chris Arrieta DO

Primary Care Provider: 
Trent Daniels MD

Consult Narrative
Reason for consult: Persistent hiccups nausea vomiting generalized weakness
History of present illness: 
49 years old male came into the emergency room with intermittent hiccups nausea vomiting and generalized weakness
CT scan of the abdomen pelvis with contrast showed inflammation medial to the cecum right pyelonephritis right Tika anal abscess

Patient follows a gastroenterologist Dr. Núñez in [facility] and currently on azathioprine 50 mg once a day mesalamine 1.2 g 4 times daily prednisone 60 mg once a day and Humira 80 mg every 2 weeks subcu

Patient has a longstanding history of perianal abscess which has been draining
cc:: 
cc: Chris Arrieta DO


Review of Systems
Review of Systems
Systems Reviewed: All systems reviewed, normal except as documented

Past Medical History
Surgical History
OTHER SURGICAL HX: As in the history of present illness

Meds
Home Medications and Allergies
Home Medications

?Medication ?Instructions ?Recorded ?Confirmed ?Type
SALOFALK 500 mg PO 08/01/24 01/13/25 History
diphenoxylate-atropine 2.5 See Rx Instructions .Route 08/21/24 01/13/25 History
mg-0.025 mg tablet .COMPLEX PRN Diarrhea   
mesalamine 500 mg capsule,extended 1,000 mg PO BID 08/28/24 01/13/25 History
release (Pentasa)    


Allergies

Allergy/AdvReac Type Severity Reaction Status Date / Time
sulfamethizole Allergy Mild Rash Verified 01/13/25 14:59



Exam
Vital Signs



Temp Pulse Resp BP Pulse Ox O2 Del Method
 98.3 F   71   17   92/61   97   Room Air 
 03/09/25 12:00  03/09/25 12:00  03/09/25 12:00  03/09/25 12:00  03/09/25 12:00  03/09/25 12:00



Constitutional
Comments: 
Chronically ill-appearing
Routine Respiratory Exam
Comments: 
Normal to auscultation
Routine Abdominal Exam
Comments: 
Right-sided abdominal tenderness positive bowel sounds

Results
Labs

03/09/25 05:53          

03/09/25 05:53          

Labs: 

Short CBC

  03/09/25 Range/Units
  05:53 
WBC  5.3  (3.8-10.6)  Thou/mm3
Hgb  8.6 L  (13.5-16.0)  g/dL
Hct  28.1 L  (41.0-53.0)  %
Plt Count  279  D  (140-440)  Thou/mm3

BMP

  03/09/25
  05:53
Sodium  139
Potassium  3.6
Chloride  107
Carbon Dioxide  23.9
BUN  6 L
Creatinine  0.7
Glucose  108 H
Calcium  7.8 L

Liver Function

  03/09/25 Range/Units
  05:53 
Total Bilirubin  0.4  (0.3-1.2)  mg/dL
AST  < 8  (0-34)  U/L
ALT  < 7 L  (10-49)  U/L
Alkaline Phosphatase  51  D  ()  U/L
Albumin  3.0 L D  (3.5-5.0)  gm/dL




Assessment and Plan
Additional Assessment & Plan
Additional Plan: 
# Persistent hiccups with nausea vomiting
Patient may have a history of gastroesophageal disease with multiple ulcers distal esophagus

Plan
Consent obtained for fiberoptic esophagogastroduodenoscopy with possible biopsy possible therapeutic intervention under intravenous moderate sedation scheduled for tomorrow
N.p.o. midnight tonight except p.o. meds

# Crohn's disease with perianal fistula and perianal abscess
Solu-Medrol 40 mg IV push every 12
Continue azathioprine as well as mesalamine along with Humira
He can follow-up with his gastroenterologist in [place] upon discharge Dr. Grove
Recommend consultation with Dr. Ritchie for the perianal abscess and fistula may need surgical intervention

Thank you once again for the opportunity to participate in the care of this patient

## 2025-03-09 NOTE — ESCONSULT_ITS
HPI    
Data of Consult    
Requesting Physician:     
Chris Arrieta DO    
    
Primary Care Provider:     
Trent Daniels MD    
    
Consult Narrative    
Reason for consult: Persistent hiccups nausea vomiting generalized weakness    
History of present illness:     
49 years old male came into the emergency room with intermittent hiccups nausea   
vomiting and generalized weakness    
CT scan of the abdomen pelvis with contrast showed inflammation medial to the   
cecum right pyelonephritis right Tika anal abscess    
    
Patient follows a gastroenterologist Dr. Núñez in [facility] and currently on   
azathioprine 50 mg once a day mesalamine 1.2 g 4 times daily prednisone 60 mg   
once a day and Humira 80 mg every 2 weeks subcu    
    
Patient has a longstanding history of perianal abscess which has been draining    
cc::     
cc: Chris Arrieta DO    
    
    
Review of Systems    
Review of Systems    
Systems Reviewed: All systems reviewed, normal except as documented    
    
Past Medical History    
Surgical History    
OTHER SURGICAL HX: As in the history of present illness    
    
Meds    
Home Medications and Allergies    
                                Home Medications    
    
    
    
?Medication ?Instructions ?Recorded ?Confirmed ?Type    
     
                          SALOFALK                  500 mg PO 08/01/24 01/13/25     
History    
     
                          diphenoxylate-atropine 2.5 See Rx Instructions .Route     
08/21/24 01/13/25 History    
    
                          mg-0.025 mg tablet        .COMPLEX PRN Diarrhea       
     
                          mesalamine 500 mg capsule,extended 1,000 mg PO BID 08/ 28/24 01/13/25 History    
    
                                        release (Pentasa)        
    
    
    
                                    Allergies    
    
    
    
Allergy/AdvReac Type Severity Reaction Status Date / Time    
     
sulfamethizole Allergy Mild Rash Verified 01/13/25 14:59    
    
    
    
    
Exam    
Vital Signs    
    
                                            
    
    
    
Temp Pulse Resp BP Pulse Ox O2 Del Method    
     
 98.3 F   71   17   92/61   97   Room Air     
     
 03/09/25 12:00  03/09/25 12:00  03/09/25 12:00  03/09/25 12:00  03/09/25 12:00   
03/09/25 12:00    
    
    
    
    
Constitutional    
Comments:     
Chronically ill-appearing    
Routine Respiratory Exam    
Comments:     
Normal to auscultation    
Routine Abdominal Exam    
Comments:     
Right-sided abdominal tenderness positive bowel sounds    
    
Results    
Labs    
    
                                                        03/09/25 05:53              
    
                                                        03/09/25 05:53              
    
Labs:     
    
                                    Short CBC    
    
    
    
  03/09/25 Range/Units    
    
  05:53     
     
WBC  5.3  (3.8-10.6)  Thou/mm3    
     
Hgb  8.6 L  (13.5-16.0)  g/dL    
     
Hct  28.1 L  (41.0-53.0)  %    
     
Plt Count  279  D  (140-440)  Thou/mm3    
    
    
                                       BMP    
    
    
    
  03/09/25    
    
  05:53    
     
Sodium  139    
     
Potassium  3.6    
     
Chloride  107    
     
Carbon Dioxide  23.9    
     
BUN  6 L    
     
Creatinine  0.7    
     
Glucose  108 H    
     
Calcium  7.8 L    
    
    
                                 Liver Function    
    
    
    
  03/09/25 Range/Units    
    
  05:53     
     
Total Bilirubin  0.4  (0.3-1.2)  mg/dL    
     
AST  < 8  (0-34)  U/L    
     
ALT  < 7 L  (10-49)  U/L    
     
Alkaline Phosphatase  51  D  ()  U/L    
     
Albumin  3.0 L D  (3.5-5.0)  gm/dL    
    
    
    
    
    
Assessment and Plan    
Additional Assessment & Plan    
Additional Plan:     
# Persistent hiccups with nausea vomiting    
Patient may have a history of gastroesophageal disease with multiple ulcers   
distal esophagus    
    
Plan    
Consent obtained for fiberoptic esophagogastroduodenoscopy with possible biopsy   
possible therapeutic intervention under intravenous moderate sedation scheduled   
for tomorrow    
N.p.o. midnight tonight except p.o. meds    
    
# Crohn's disease with perianal fistula and perianal abscess    
Solu-Medrol 40 mg IV push every 12    
Continue azathioprine as well as mesalamine along with Humira    
He can follow-up with his gastroenterologist in [place] upon discharge Dr. Grove    
Recommend consultation with Dr. Ritchie for the perianal abscess and fistula may   
need surgical intervention    
    
Thank you once again for the opportunity to participate in the care of this   
patient

## 2025-03-09 NOTE — PC.SS
Dave Batista is 49 year old male admitted to [facility] for Perianal Abscess. SS conducted bedside contact with the patient to complete initial assessment and to discuss discharge planning.? SW used all precautionary measures to complete initial. 
Role and reason for the contact was explained to Dave. Pt is alert and oriented times 4. SS spoke with Adriano LUCERO to assist with translation.

Patient confirmed demographic information confirming information correct on facesheet. Pt lives with children. Patient identifies Latoya Batista, daughter, [phone], as his surrogate decision maker. Pt states he is independent and does not need 
assistance with ADLs. Pt does not use DME nor O2. Pt states he does not have history of mental health or substance history. Pts identify PCP Trent Daniels. Pharmacy of choice is [facility]. Discharge options discussed and the pt will return home. 
Pt stated no diabetes; pt stated no dialysis services. SS discussed [employer] Directive, pt was not receptive. ?No further intervention required at this time,  would be available to address any further concerns.

DC Plan: Home 

Contact: Latoya Batista, leisa, [phone]

Address: Confirmed on face sheet 

PCP: Trent Daniels

## 2025-03-09 NOTE — PD.RESPRO
Documentation for date of:
03/09/25


Subjective
Subjective
Interval history: 
Patient seen and examined at bedside this morning.  No acute overnight events.  Vitals stable, labs reviewed.  Hemoglobin with mild decrease, however no signs of bleed noted.  Possibly dilutional after IV fluid resuscitation.  Urine cultures 
pending.  Patient complains of hiccups which leads to worsened abdominal pain.  Will continue IV antibiotics at this time, with no plans for surgery as of now.  He is wondering when he will be discharged as he has a court case on Tuesday.  Will also 
resume home mesalamine.

Exam
Vital Signs


Temp Pulse Resp BP Pulse Ox O2 Del Method
 98.3 F   71   17   92/61   97   Room Air 
 03/09/25 12:00  03/09/25 12:00  03/09/25 12:00  03/09/25 12:00  03/09/25 12:00 03/09/25 12:00


Narrative Exam
Gen: AAOx3, Citizen of [place]-speaking male, pleasant to speak with and answers questions appropriately
HEENT: NCAT, PERRLA, EOMI, MMM
CVS: normal S1, S2. RRR. No MRG
Resp: CTA B/L. No rhonchi, rales, crackles or wheezing
Abd: soft, TTP in LLQ, no rebound tenderness, negative Rovsing sign, non-distended. BS+ in all 4 quadrants
: Perianal fistula
MSK: Good ROM in BUE & BLE. No edema or rash.
Neuro: CN II-XII grossly intact.  No focal deficits noted

Objective
Labs

03/09/25 05:53          

03/09/25 05:53          

Labs: 
Laboratory Results - last 24 hr

  03/08/25 03/09/25
  08:20 05:53
WBC   5.3
RBC   4.17 L
Hgb   8.6 L
Hct   28.1 L
MCV   67 L
MCH   20.6 L
MCHC   30.6 L
RDW Std Deviation   42.4
Plt Count   279  D
Neut % (Auto)   65
Lymph % (Auto)   24
Mono % (Auto)   9
Eos % (Auto)   1
Baso % (Auto)   1
Neut # (Auto)   3.5
Lymph # (Auto)   1.3
Mono # (Auto)   0.5
Eos # (Auto)   0.0
Baso # (Auto)   0.0
Immature Gran # (Auto)   0.01 H
Absolute Nucleated RBC   0.00
Immature Gran %   0
Nucleated RBC %   0
Smear Path Review  Sent to Pathologist 
Sodium   139
Potassium   3.6
Chloride   107
Carbon Dioxide   23.9
Anion Gap   8
BUN   6 L
Creatinine   0.7
Estim Creat Clear Calc   97.1
eGFR   > 60
BUN/Creatinine Ratio   9 L
Glucose   108 H
Calculated Osmolality   276
Calcium   7.8 L
Corrected Calcium   8.6
Phosphorus   3.3
Magnesium   1.9
Iron   6 L
TIBC   161 L
Iron Saturation   3 L
Unsat Iron Binding   155 L
Ferritin   344 H
Total Bilirubin   0.4
AST   < 8
ALT   < 7 L
Alkaline Phosphatase   51  D
Total Protein   5.3 L
Albumin   3.0 L D
Globulin   2.3
Albumin/Globulin Ratio   1.3



Quality Measures
Quality Measures
VTE prophylaxis

Assessment & Plan
Assessment
Current Active Medications: 


Generic Name Dose Route Start Last Admin
  Trade Name Freq  PRN Reason Stop Dose Admin
Acetaminophen  650 mg  03/09/25 11:16 
  Acetaminophen 325 Mg Tablet  PO  04/07/25 14:29 
  Q6H PRN  
  Fever >100.3 or pain  
Piperacillin Sod/Tazobactam  50 mls @ 12.5 mls/hr  03/08/25 22:00  03/09/25 05:34
  Sod 3.375 gm/ Sodium Chloride  IV  03/15/25 21:59  12.5 mls/hr
  Q8HR LUCITA   Administration
Ondansetron HCl  4 mg  03/08/25 19:47  03/09/25 05:33
  Ondansetron Inj 2 Mg/Ml Inj 2 Ml  IV  04/07/25 19:46  4 mg
  Q6HR PRN   Administration
  NAUSEA OR VOMITING  
  Protocol  
Oxycodone/Acetaminophen  1 tab  03/08/25 14:30 
  Oxycodone/Apap 5/325 Tablet  PO  03/13/25 14:29 
  Q6H PRN  
  PAIN SCALE 4-6 (Moderate  
Prednisone  5 mg  03/09/25 09:00  03/09/25 07:50
  Prednisone 5 Mg Tablet  PO  04/08/25 08:59  5 mg
  QDAY LUCITA   Administration


Plan
Patient is a 49-year-old male with Crohn's disease (diagnosed July 2024) who presented to the ED with weakness, hiccups and was found to have a perianal fistula, with concern for pyelonephritis as patient had CVA tenderness and UA consistent with 
UTI.  Patient was started on IV antibiotics and IV fluids.  General surgery was consulted however patient does not require I&D at this time.

#Crohn's disease
#Perianal fistula
#Weakness
#Abdominal pain
Abscess as noted on exam, and seen on CT A/P (3 x 4 cm)
Patient started on IV Zosyn
Continue prednisone 5mg, home mesalamine at 1000mg twice daily
Low fiber diet
Gastroenterology consulted, appreciate recs
General Surgery consulted: No need for I&D at this time
Oxycodone for pain prn

#?Acute pyelonephritis
#UTI
UA consistent with UTI; patient with CVA tenderness on exam; CT A/P also shows pyelonephritis
Urine culture pending
IV Zosyn to cover

#Microcytic anemia
Hgb 8.6, MCV 67
No active bleed at this time.  Drop in hemoglobin appears to be dilutional after IV fluid resuscitation
Iron panel consistent with iron deficiency anemia, likely from poor absorption in setting of Crohn's disease
Patient may benefit from IV iron

#Hiccups
Patient endorses having intermittent hiccups, leading to worsening abdominal pain
Can consider Thorazine if hiccups become intractable

Dispo: Continue IV antibiotics; pending urine culture
GI PPx: None
DVT PPx: Lovenox
Diet: Low fiber
CODE STATUS: Full code

Patient seen and care discussed with my attending Dr. Arrieta.
Jose Hernandez MD PGY-3
Attending Provider Attestation/Addendum
I have discussed and was present for the essential components of the history, physical examination, diagnosis, and treatment plan with the resident. I agree with the patient's care as documented by the resident and amended herein by me. Enrike Arrieta DO.

Although this document has been carefully reviewed, there may still be some phonetic and other typographical errors.  These errors are purely grammatical due to imperfections in the software program and should not be construed in any way to 
compromise the substance of the patient's medical care during this visit.

## 2025-03-10 VITALS
SYSTOLIC BLOOD PRESSURE: 97 MMHG | OXYGEN SATURATION: 96 % | RESPIRATION RATE: 15 BRPM | DIASTOLIC BLOOD PRESSURE: 65 MMHG | HEART RATE: 61 BPM | TEMPERATURE: 97.1 F

## 2025-03-10 VITALS
OXYGEN SATURATION: 98 % | DIASTOLIC BLOOD PRESSURE: 67 MMHG | TEMPERATURE: 96.8 F | RESPIRATION RATE: 16 BRPM | HEART RATE: 60 BPM | SYSTOLIC BLOOD PRESSURE: 105 MMHG

## 2025-03-10 VITALS
RESPIRATION RATE: 14 BRPM | OXYGEN SATURATION: 97 % | SYSTOLIC BLOOD PRESSURE: 93 MMHG | DIASTOLIC BLOOD PRESSURE: 64 MMHG | TEMPERATURE: 96.98 F | HEART RATE: 64 BPM

## 2025-03-10 VITALS
TEMPERATURE: 97.1 F | HEART RATE: 61 BPM | OXYGEN SATURATION: 97 % | DIASTOLIC BLOOD PRESSURE: 63 MMHG | RESPIRATION RATE: 14 BRPM | SYSTOLIC BLOOD PRESSURE: 98 MMHG

## 2025-03-10 VITALS
DIASTOLIC BLOOD PRESSURE: 68 MMHG | OXYGEN SATURATION: 96 % | RESPIRATION RATE: 17 BRPM | HEART RATE: 58 BPM | SYSTOLIC BLOOD PRESSURE: 98 MMHG | TEMPERATURE: 96.98 F

## 2025-03-10 VITALS
OXYGEN SATURATION: 98 % | SYSTOLIC BLOOD PRESSURE: 94 MMHG | HEART RATE: 63 BPM | DIASTOLIC BLOOD PRESSURE: 67 MMHG | TEMPERATURE: 97.7 F | RESPIRATION RATE: 17 BRPM

## 2025-03-10 VITALS
DIASTOLIC BLOOD PRESSURE: 72 MMHG | HEART RATE: 77 BPM | SYSTOLIC BLOOD PRESSURE: 120 MMHG | OXYGEN SATURATION: 88 % | RESPIRATION RATE: 9 BRPM

## 2025-03-10 VITALS
OXYGEN SATURATION: 96 % | HEART RATE: 65 BPM | TEMPERATURE: 97 F | RESPIRATION RATE: 14 BRPM | DIASTOLIC BLOOD PRESSURE: 61 MMHG | SYSTOLIC BLOOD PRESSURE: 101 MMHG

## 2025-03-10 VITALS
OXYGEN SATURATION: 95 % | SYSTOLIC BLOOD PRESSURE: 95 MMHG | TEMPERATURE: 97.1 F | DIASTOLIC BLOOD PRESSURE: 59 MMHG | RESPIRATION RATE: 16 BRPM | HEART RATE: 56 BPM

## 2025-03-10 VITALS
SYSTOLIC BLOOD PRESSURE: 106 MMHG | OXYGEN SATURATION: 95 % | DIASTOLIC BLOOD PRESSURE: 65 MMHG | RESPIRATION RATE: 16 BRPM | HEART RATE: 61 BPM | TEMPERATURE: 97.16 F

## 2025-03-10 VITALS
DIASTOLIC BLOOD PRESSURE: 68 MMHG | RESPIRATION RATE: 14 BRPM | SYSTOLIC BLOOD PRESSURE: 100 MMHG | HEART RATE: 63 BPM | OXYGEN SATURATION: 99 %

## 2025-03-10 VITALS
HEART RATE: 65 BPM | RESPIRATION RATE: 16 BRPM | OXYGEN SATURATION: 94 % | SYSTOLIC BLOOD PRESSURE: 123 MMHG | DIASTOLIC BLOOD PRESSURE: 75 MMHG

## 2025-03-10 VITALS — OXYGEN SATURATION: 100 % | SYSTOLIC BLOOD PRESSURE: 106 MMHG | DIASTOLIC BLOOD PRESSURE: 68 MMHG | HEART RATE: 60 BPM

## 2025-03-10 VITALS
OXYGEN SATURATION: 99 % | SYSTOLIC BLOOD PRESSURE: 92 MMHG | TEMPERATURE: 99.32 F | DIASTOLIC BLOOD PRESSURE: 62 MMHG | HEART RATE: 67 BPM | RESPIRATION RATE: 15 BRPM

## 2025-03-10 VITALS
RESPIRATION RATE: 15 BRPM | SYSTOLIC BLOOD PRESSURE: 95 MMHG | DIASTOLIC BLOOD PRESSURE: 65 MMHG | OXYGEN SATURATION: 96 % | TEMPERATURE: 96.98 F | HEART RATE: 64 BPM

## 2025-03-10 VITALS
OXYGEN SATURATION: 100 % | DIASTOLIC BLOOD PRESSURE: 64 MMHG | HEART RATE: 65 BPM | SYSTOLIC BLOOD PRESSURE: 100 MMHG | RESPIRATION RATE: 16 BRPM

## 2025-03-10 VITALS — HEART RATE: 57 BPM | RESPIRATION RATE: 18 BRPM

## 2025-03-10 LAB
ALANINE AMINOTRANSFERASE: < 7 U/L (ref 10–49)
ALBUMIN, SERUM: 3.1 GM/DL (ref 3.5–5)
ALBUMIN/GLOBULIN RATIO: 1.2 (ref 1.2–2.2)
ALKALINE PHOSPHATASE: 51 U/L (ref 46–116)
ANION GAP: 10 (ref 7–16)
ASPARTATE AMINO TRANSFERASE: 10 U/L (ref 0–34)
BASOPHILS # (AUTO): 0 THOU/MM3 (ref 0–0.2)
BASOPHILS % (AUTO): 0 % (ref 0–2.5)
BILIRUBIN,TOTAL: 0.3 MG/DL (ref 0.3–1.2)
BLOOD UREA NITROGEN: 7 MG/DL (ref 9–23)
BUN/CREATININE RATIO: 10 RATIO (ref 12–20)
CALCIUM (CORRECTED): 9.3 MG/DL (ref 8.5–10.1)
CALCIUM: 8.6 MG/DL (ref 8.3–10.6)
CARBON DIOXIDE: 26.4 MMOL/L (ref 20–31)
CHLORIDE: 107 MMOL/L (ref 98–107)
CREAT CL PREDICTED SERPL C-G-VRATE: 97.1 ML/MIN (ref 60–?)
CREATININE (COMPONENT): 0.7 MG/DL (ref 0.6–1.3)
EGFR: > 60 SEE NOTE
EOSINOPHILS # (AUTO): 0.1 THOU/MM3 (ref 0–0.5)
EOSINOPHILS % (AUTO): 1 % (ref 0–10)
GLOBULIN: 2.6 GM/DL (ref 2.3–3.5)
GLUCOSE: 96 MG/DL (ref 74–106)
HEMATOCRIT: 28.3 % (ref 41–53)
HEMOGLOBIN: 8.6 G/DL (ref 13.5–16)
IMM GRANULOCYTES # BLD AUTO: 0.01 THOU/MM3 (ref 0–0)
IMMATURE GRANULOCYTES % (AUTO): 0 % (ref 0–0)
LACTATE SERPL-SCNC: 0.9 MMOL/L (ref 0.4–2)
LYMPHOCYTES # (AUTO): 1.7 THOU/MM3 (ref 1–4.8)
LYMPHOCYTES % (AUTO): 35 % (ref 10–50)
MAGNESIUM: 2 MG/DL (ref 1.6–2.6)
MEAN CORPUSCULAR HEMOGLOBIN: 20.5 PG (ref 25–35)
MEAN CORPUSCULAR HGB CONC: 30.4 G/DL (ref 31–37)
MEAN CORPUSCULAR VOLUME: 67 FL (ref 80–100)
MONOCYTES # (AUTO): 0.4 THOU/MM3 (ref 0–0.8)
MONOCYTES % (AUTO): 9 % (ref 0–12)
NEUTROPHILS # (AUTO): 2.6 THOU/MM3 (ref 1.8–7.7)
NEUTROPHILS % (AUTO): 55 % (ref 37–80)
NRBC BLD-RTO: 0 /100 WBC
NUCLEATED RED BLOOD CELL #: 0 THOU/MM3 (ref 0–0)
OSMOLALITY,CALCULATED: 282 (ref 275–295)
PLATELET COUNT: 294 THOU/MM3 (ref 140–440)
POTASSIUM: 3.2 MMOL/L (ref 3.4–5.1)
RDW STANDARD DEVIATION: 42 FL (ref 35.1–43.9)
RED BLOOD COUNT: 4.2 MILN/MM3 (ref 4.5–5.9)
SODIUM: 143 MMOL/L (ref 136–145)
TOTAL PROTEIN: 5.7 GM/DL (ref 5.7–8.2)
WHITE BLOOD COUNT: 4.8 THOU/MM3 (ref 3.8–10.6)

## 2025-03-10 PROCEDURE — 0DB48ZX EXCISION OF ESOPHAGOGASTRIC JUNCTION, VIA NATURAL OR ARTIFICIAL OPENING ENDOSCOPIC, DIAGNOSTIC: ICD-10-PCS | Performed by: SPECIALIST

## 2025-03-10 RX ADMIN — MESALAMINE 1000 MG: 250 CAPSULE ORAL at 21:25

## 2025-03-10 RX ADMIN — MESALAMINE 1000 MG: 250 CAPSULE ORAL at 08:23

## 2025-03-10 NOTE — ESPR_ITS
<Statement entered by Demarco Winston MD - 03/10/25 20:30>    
Patient was seen and examined at the bedside.No acute over night events werer   
reported.  Hemoglobin remained stable.  GI specialist recommended to start   
patient on azathioprine 50 mg once daily, Solu-Medrol and continue mesalamine.    
He recommended that he will to EGD today and keep the patient n.p.o. for now.    
Electrolytes were repleted.  All labs and orders were reviewed.    
    
I saw and examined the patient, and I agree with current management stated by Dr Shahla MD,PGY1.    
Plan of care was discussed with the attending physician and resident physician.    
    
Disclaimer: Despite multiple revisions, due to the dictation software being   
used, the document bellow may not be free of grammatical errors including   
phonetic/typographic errors. However, this does not deter from our commitment to  
providing health care in the patient's best interest in mind.    
    
Dr. Marry MD, PGY 2    
    
Documentation for date of:    
03/10/25    
    
    
Subjective    
Subjective    
Interval history:     
Dave Batista is a 49-year-old male with a past medical history of Crohn's   
disease (diagnosed July 2024) who presented to the ED with weakness, hiccups and  
was found to have a perianal fistula, with concern for pyelonephritis as patient  
had CVA tenderness and UA consistent with UTI.  Patient was started on IV   
antibiotics and IV fluids.  General surgery was consulted however patient does   
not require I&D at this time.    
    
03/10: Seen and examined at bedside this morning.  No acute overnight events.    
Vitals stable, labs reviewed.  Hemoglobin with mild decrease, however no signs   
of bleed noted.  Possibly dilutional after IV fluid resuscitation.  Urine   
cultures pending.  Patient complains of hiccups which leads to worsened   
abdominal pain.  Will continue IV antibiotics at this time, with no plans for   
surgery as of now.  He is wondering when he will be discharged as he has a court  
case on Tuesday.  Will also resume home mesalamine.    
    
03/11: Seen and examined at bedside this morning.  No acute overnight events.    
Hemoglobin still remains low but stable and patient denies any signs of active   
bleeding, including hematochezia, melena, hemoptysis, or hematemesis.  GI   
following and plans for EGD today to evaluate hiccups and suspect possible GERD   
or ulcers in distal esophagus.  Placed NPO and will await further   
recommendations after EGD.  Otherwise, K noted to be 3.2 and repelted in AM but   
otherwise CMP unremarkable.  Home azathioprine started and will change steroids   
to Solu-Medrol 40 mg IV BID.    
    
Exam    
Vital Signs    
                                            
    
    
    
Temp Pulse Resp BP Pulse Ox O2 Del Method    
     
 96.8 F   60   16   105/67   98   Room Air     
     
 03/10/25 11:06  03/10/25 11:06  03/10/25 11:06  03/10/25 11:06  03/10/25 11:06   
03/10/25 11:06    
    
    
    
Narrative Exam    
General: AOx3, laying comfortably in bed, able to speak full sentences    
HEENT: NC/AT, mucous membranes moist, bilateral sclera anicteric    
Cardiovascular: regular rate and rhythm, S1/S2 present, no murmurs appreciated    
Pulmonary: clear to auscultation bilaterally, no rales/rhonchi/wheezes    
Abdominal: tenderness to palpation in lower quadrants, soft, nondistended, no   
guarding    
Musculoskeletal: normal ROM, no peripheral edema    
Skin: warm and dry, intact, no rashes    
Neuro: CN II-XII intact, no focal deficits    
    
Objective    
Labs    
    
                                                        03/11/25 05:19              
    
                                                        03/11/25 05:19              
    
Labs:     
                         Laboratory Results - last 24 hr    
    
    
    
  03/10/25 03/10/25    
    
  05:41 10:00    
     
WBC  4.8     
     
RBC  4.20 L     
     
Hgb  8.6 L     
     
Hct  28.3 L     
     
MCV  67 L     
     
MCH  20.5 L     
     
MCHC  30.4 L     
     
RDW Std Deviation  42.0     
     
Plt Count  294     
     
Neut % (Auto)  55     
     
Lymph % (Auto)  35     
     
Mono % (Auto)  9     
     
Eos % (Auto)  1     
     
Baso % (Auto)  0     
     
Neut # (Auto)  2.6     
     
Lymph # (Auto)  1.7     
     
Mono # (Auto)  0.4     
     
Eos # (Auto)  0.1     
     
Baso # (Auto)  0.0     
     
Immature Gran # (Auto)  0.01 H     
     
Absolute Nucleated RBC  0.00     
     
Immature Gran %  0     
     
Nucleated RBC %  0     
     
Sodium  143     
     
Potassium  3.2 L     
     
Chloride  107     
     
Carbon Dioxide  26.4     
     
Anion Gap  10     
     
BUN  7 L     
     
Creatinine  0.7     
     
Estim Creat Clear Calc  97.1     
     
eGFR  > 60     
     
BUN/Creatinine Ratio  10 L     
     
Glucose  96     
     
Calculated Osmolality  282     
     
Lactic Acid   0.9    
     
Calcium  8.6     
     
Corrected Calcium  9.3     
     
Magnesium  2.0     
     
Total Bilirubin  0.3     
     
AST  10     
     
ALT  < 7 L     
     
Alkaline Phosphatase  51     
     
Total Protein  5.7     
     
Albumin  3.1 L     
     
Globulin  2.6     
     
Albumin/Globulin Ratio  1.2     
    
    
    
    
Quality Measures    
Quality Measures    
VTE prophylaxis    
    
Assessment & Plan    
Assessment    
Current Active Medications:     
                                            
    
    
    
Generic Name Dose Route Start Last Admin    
    
  Trade Name Freq  PRN Reason Stop Dose Admin    
     
Acetaminophen  650 mg  03/09/25 11:16     
    
  Acetaminophen 325 Mg Tablet  PO  04/07/25 14:29     
    
  Q6H PRN      
    
  Fever >100.3 or pain      
     
Azathioprine  50 mg  03/10/25 09:00  03/10/25 10:00    
    
  Azathioprine 50 Mg Tablet  PO  04/09/25 08:59  50 mg    
    
  QDAY LUCITA   Administration    
     
Piperacillin Sod/Tazobactam  50 mls @ 12.5 mls/hr  03/08/25 22:00  03/10/25   
05:21    
    
  Sod 3.375 gm/ Sodium Chloride  IV  03/15/25 21:59  12.5 mls/hr    
    
  Q8HR LUCITA   Administration    
     
Mesalamine  1,000 mg  03/09/25 21:00  03/10/25 08:23    
    
  Mesalamine 250 Mg Capsule.Er  PO  04/08/25 20:59  1,000 mg    
    
  BID LUCITA   Administration    
     
Methylprednisolone Sodium Succinate  40 mg  03/10/25 21:00     
    
  Methylprednisolone Sod Succ 40 Mg Vial  IVP  03/17/25 20:59     
    
  Q12HR LUCITA      
     
Ondansetron HCl  4 mg  03/08/25 19:47  03/09/25 21:53    
    
  Ondansetron Inj 2 Mg/Ml Inj 2 Ml  IV  04/07/25 19:46  4 mg    
    
  Q6HR PRN   Administration    
    
  NAUSEA OR VOMITING      
    
  Protocol      
     
Oxycodone/Acetaminophen  1 tab  03/08/25 14:30     
    
  Oxycodone/Apap 5/325 Tablet  PO  03/13/25 14:29     
    
  Q6H PRN      
    
  PAIN SCALE 4-6 (Moderate      
    
    
    
Plan    
Dave Batista is a 49-year-old male with a past medical history of Crohn's   
disease (diagnosed July 2024) who presented to the ED with weakness, hiccups and  
was found to have a perianal fistula, with concern for pyelonephritis as patient  
had CVA tenderness and UA consistent with UTI.  Patient was started on IV   
antibiotics and IV fluids.  General surgery was consulted however patient does   
not require I&D at this time.    
    
#Crohn's disease    
#Perianal fistula    
#Abdominal pain    
Abscess as noted on exam, and seen on CT A/P (3 x 4 cm)    
? Zosyn 3.375 g IV every 8 hours (03/08-)    
? Azathioprine 50 mg p.o. daily    
? Mesalamine 1000 mg p.o. twice daily    
? Methylprednisolone 40 mg IV every 12 hours    
? GI consulted, appreciate recommendations    
? General Surgery consulted, no need for I&D at this time    
? Oxycodone for pain as needed    
    
#? Acute pyelonephritis    
#UTI    
UA consistent with UTI and CVA tenderness noted on exam.  CT A/P noted acute   
right-sided pyelonephritis.    
? Urine culture 03/08: Mixed kaylee, possible contamination    
? Zosyn as above    
    
#Hiccups    
Endorses intermittent hiccups, leading to worsening abdominal pain.  GI   
consulted and plans for EGD to evaluate, suspect underlying GERD versus PUD.    
? N.p.o.    
? Follow-up EGD results    
    
#Microcytic anemia    
Hgb 8.6, MCV 67.  No active bleeding at this time.  Drop in hemoglobin likely   
due to be dilutional after IVF resuscitation.    
Iron panel consistent with iron deficiency anemia, likely from poor absorption   
in setting of Crohn's disease.    
? Patient may benefit from IV iron    
    
Hospital management:    
Dispo: Continue IV antibiotics; pending urine culture    
GI PPx: None    
DVT PPx: Lovenox    
Diet: Low fiber    
CODE STATUS: Full code    
    
-----    
    
Plan discussed with attending physician Dr. Arrieta and senior resident physician  
Dr. Marry Gale MD    
PGY-1    
Internal Medicine    
Attending Provider Attestation/Addendum    
I have discussed and was present for the essential components of the history,   
physical examination, diagnosis, and treatment plan with the resident. I agree   
with the patient's care as documented by the resident and amended herein by me.   
Enrike Arrieta DO.    
    
Although this document has been carefully reviewed, there may still be some   
phonetic and other typographical errors.  These errors are purely grammatical   
due to imperfections in the software program and should not be construed in any   
way to compromise the substance of the patient's medical care during this visit.

## 2025-03-10 NOTE — SUR.PHASEI
pt awake and alert, breathing unlabored on room air. v/s stable. report called to Teri BULL. pt will be transferred to floor at this time.

## 2025-03-10 NOTE — SUR.PHASEI
pt received from OR in recovery bay 5. pt asleep but responds to voice, breathing unlabored on nc 2l. v/s stable. report received from Gracy BULL.

## 2025-03-10 NOTE — PD.RESPRO
Documentation for date of:
03/10/25


Subjective
Subjective
Interval history: 
Dave Batista is a 49-year-old male with a past medical history of Crohn's disease (diagnosed July 2024) who presented to the ED with weakness, hiccups and was found to have a perianal fistula, with concern for pyelonephritis as patient had CVA 
tenderness and UA consistent with UTI.  Patient was started on IV antibiotics and IV fluids.  General surgery was consulted however patient does not require I&D at this time.

03/10: Seen and examined at bedside this morning.  No acute overnight events.  Vitals stable, labs reviewed.  Hemoglobin with mild decrease, however no signs of bleed noted.  Possibly dilutional after IV fluid resuscitation.  Urine cultures pending. 
 Patient complains of hiccups which leads to worsened abdominal pain.  Will continue IV antibiotics at this time, with no plans for surgery as of now.  He is wondering when he will be discharged as he has a court case on Tuesday.  Will also resume 
home mesalamine.

03/11: Seen and examined at bedside this morning.  No acute overnight events.  Hemoglobin still remains low but stable and patient denies any signs of active bleeding, including hematochezia, melena, hemoptysis, or hematemesis.  GI following and 
plans for EGD today to evaluate hiccups and suspect possible GERD or ulcers in distal esophagus.  Placed NPO and will await further recommendations after EGD.  Otherwise, K noted to be 3.2 and repelted in AM but otherwise CMP unremarkable.  Home 
azathioprine started and will change steroids to Solu-Medrol 40 mg IV BID.

Exam
Vital Signs


Temp Pulse Resp BP Pulse Ox O2 Del Method
 96.8 F   60   16   105/67   98   Room Air 
 03/10/25 11:06  03/10/25 11:06  03/10/25 11:06  03/10/25 11:06  03/10/25 11:06  03/10/25 11:06


Narrative Exam
General: AOx3, laying comfortably in bed, able to speak full sentences
HEENT: NC/AT, mucous membranes moist, bilateral sclera anicteric
Cardiovascular: regular rate and rhythm, S1/S2 present, no murmurs appreciated
Pulmonary: clear to auscultation bilaterally, no rales/rhonchi/wheezes
Abdominal: tenderness to palpation in lower quadrants, soft, nondistended, no guarding
Musculoskeletal: normal ROM, no peripheral edema
Skin: warm and dry, intact, no rashes
Neuro: CN II-XII intact, no focal deficits

Objective
Labs

03/11/25 05:19          

03/11/25 05:19          

Labs: 
Laboratory Results - last 24 hr

  03/10/25 03/10/25
  05:41 10:00
WBC  4.8 
RBC  4.20 L 
Hgb  8.6 L 
Hct  28.3 L 
MCV  67 L 
MCH  20.5 L 
MCHC  30.4 L 
RDW Std Deviation  42.0 
Plt Count  294 
Neut % (Auto)  55 
Lymph % (Auto)  35 
Mono % (Auto)  9 
Eos % (Auto)  1 
Baso % (Auto)  0 
Neut # (Auto)  2.6 
Lymph # (Auto)  1.7 
Mono # (Auto)  0.4 
Eos # (Auto)  0.1 
Baso # (Auto)  0.0 
Immature Gran # (Auto)  0.01 H 
Absolute Nucleated RBC  0.00 
Immature Gran %  0 
Nucleated RBC %  0 
Sodium  143 
Potassium  3.2 L 
Chloride  107 
Carbon Dioxide  26.4 
Anion Gap  10 
BUN  7 L 
Creatinine  0.7 
Estim Creat Clear Calc  97.1 
eGFR  > 60 
BUN/Creatinine Ratio  10 L 
Glucose  96 
Calculated Osmolality  282 
Lactic Acid   0.9
Calcium  8.6 
Corrected Calcium  9.3 
Magnesium  2.0 
Total Bilirubin  0.3 
AST  10 
ALT  < 7 L 
Alkaline Phosphatase  51 
Total Protein  5.7 
Albumin  3.1 L 
Globulin  2.6 
Albumin/Globulin Ratio  1.2 



Quality Measures
Quality Measures
VTE prophylaxis

Assessment & Plan
Assessment
Current Active Medications: 


Generic Name Dose Route Start Last Admin
  Trade Name Freq  PRN Reason Stop Dose Admin
Acetaminophen  650 mg  03/09/25 11:16 
  Acetaminophen 325 Mg Tablet  PO  04/07/25 14:29 
  Q6H PRN  
  Fever >100.3 or pain  
Azathioprine  50 mg  03/10/25 09:00  03/10/25 10:00
  Azathioprine 50 Mg Tablet  PO  04/09/25 08:59  50 mg
  QDAY LUCITA   Administration
Piperacillin Sod/Tazobactam  50 mls @ 12.5 mls/hr  03/08/25 22:00  03/10/25 05:21
  Sod 3.375 gm/ Sodium Chloride  IV  03/15/25 21:59  12.5 mls/hr
  Q8HR LUCITA   Administration
Mesalamine  1,000 mg  03/09/25 21:00  03/10/25 08:23
  Mesalamine 250 Mg Capsule.Er  PO  04/08/25 20:59  1,000 mg
  BID LUCITA   Administration
Methylprednisolone Sodium Succinate  40 mg  03/10/25 21:00 
  Methylprednisolone Sod Succ 40 Mg Vial  IVP  03/17/25 20:59 
  Q12HR LUCITA  
Ondansetron HCl  4 mg  03/08/25 19:47  03/09/25 21:53
  Ondansetron Inj 2 Mg/Ml Inj 2 Ml  IV  04/07/25 19:46  4 mg
  Q6HR PRN   Administration
  NAUSEA OR VOMITING  
  Protocol  
Oxycodone/Acetaminophen  1 tab  03/08/25 14:30 
  Oxycodone/Apap 5/325 Tablet  PO  03/13/25 14:29 
  Q6H PRN  
  PAIN SCALE 4-6 (Moderate  


Plan
Dave Batista is a 49-year-old male with a past medical history of Crohn's disease (diagnosed July 2024) who presented to the ED with weakness, hiccups and was found to have a perianal fistula, with concern for pyelonephritis as patient had CVA 
tenderness and UA consistent with UTI.  Patient was started on IV antibiotics and IV fluids.  General surgery was consulted however patient does not require I&D at this time.

#Crohn's disease
#Perianal fistula
#Abdominal pain
Abscess as noted on exam, and seen on CT A/P (3 x 4 cm)
? Zosyn 3.375 g IV every 8 hours (03/08-)
? Azathioprine 50 mg p.o. daily
? Mesalamine 1000 mg p.o. twice daily
? Methylprednisolone 40 mg IV every 12 hours
? GI consulted, appreciate recommendations
? General Surgery consulted, no need for I&D at this time
? Oxycodone for pain as needed

#? Acute pyelonephritis
#UTI
UA consistent with UTI and CVA tenderness noted on exam.  CT A/P noted acute right-sided pyelonephritis.
? Urine culture 03/08: Mixed kaylee, possible contamination
? Zosyn as above

#Hiccups
Endorses intermittent hiccups, leading to worsening abdominal pain.  GI consulted and plans for EGD to evaluate, suspect underlying GERD versus PUD.
? N.p.o.
? Follow-up EGD results

#Microcytic anemia
Hgb 8.6, MCV 67.  No active bleeding at this time.  Drop in hemoglobin likely due to be dilutional after IVF resuscitation.
Iron panel consistent with iron deficiency anemia, likely from poor absorption in setting of Crohn's disease.
? Patient may benefit from IV iron

Hospital management:
Dispo: Continue IV antibiotics; pending urine culture
GI PPx: None
DVT PPx: Lovenox
Diet: Low fiber
CODE STATUS: Full code

-----

Plan discussed with attending physician Dr. Arrieta and senior resident physician Dr. Catrachito Gale MD
PGY-1
Internal Medicine
Attending Provider Attestation/Addendum
I have discussed and was present for the essential components of the history, physical examination, diagnosis, and treatment plan with the resident. I agree with the patient's care as documented by the resident and amended herein by me. Enrike Arrieta DO.

Although this document has been carefully reviewed, there may still be some phonetic and other typographical errors.  These errors are purely grammatical due to imperfections in the software program and should not be construed in any way to 
compromise the substance of the patient's medical care during this visit.

## 2025-03-10 NOTE — PD.SURCONS
HPI
Consult details
History of present illness: 
49M with Crohn's who initially presented with a perianal abscess and associated fistula s/p EUA with seton placement 12/2024, seton removed 8/20/2024, admitted with abdominal pain and findings of pyelonephritis. Pt underwent CT AP which also 
indicated a perianal abscess up to 4cm, however he states the perianal region is stably draining and he denies pain to the area. His BMs range between constipation and diarrhea and he continues to have difficulty following up with his GI in 
[place], stating that it is difficult to get appts and that sometimes when he is there he is not having any symptoms



Review of Systems
Review of Systems
ROS Unobtainable: All systems reviewed & no additional complaints except as documented

Meds
Home Medications and Allergies
Home Medications

?Medication ?Instructions ?Recorded ?Confirmed ?Type
SALOFALK 500 mg PO 08/01/24 01/13/25 History
diphenoxylate-atropine 2.5 See Rx Instructions .Route 08/21/24 01/13/25 History
mg-0.025 mg tablet .COMPLEX PRN Diarrhea   
mesalamine 500 mg capsule,extended 1,000 mg PO BID 08/28/24 01/13/25 History
release ([facility])    


Allergies

Allergy/AdvReac Type Severity Reaction Status Date / Time
sulfamethizole Allergy Mild Rash Verified 01/13/25 14:59



Exam
Vital Signs



Temp Pulse Resp BP Pulse Ox O2 Del Method
 96.8 F   60   16   105/67   98   Room Air 
 03/10/25 11:06  03/10/25 11:06  03/10/25 11:06  03/10/25 11:06  03/10/25 11:06  03/10/25 11:06



Constitutional
Constitutional: no acute distress
Routine Respiratory Exam
Respiratory: Present no resp distress
Routine Rectal Exam
Comments: 
right perianal chronic wound with purulent drainage, no erythema, no fluctuance or tenderness

Results
Results: Laboratory
Laboratory results: results reviewed
Results: Imaging
CT scan - abdomen: report reviewed

Assessment & Plan
Plan
49M with Crohn's who initially presented with a perianal abscess and associated fistula s/p EUA with seton placement 12/2024, seton removed 8/20/2024, admitted with pyelonephritis. On exam he does not have signs of an abscess but what is most likely 
being represented on the CT is his chronic perianal fistula which does not need surgical intervention for now

Appreciate GI recs
Follow up with GI as outpt

## 2025-03-10 NOTE — ESCONSULT_ITS
HPI    
Consult details    
History of present illness:     
49M with Crohn's who initially presented with a perianal abscess and associated   
fistula s/p EUA with seton placement 12/2024, seton removed 8/20/2024, admitted   
with abdominal pain and findings of pyelonephritis. Pt underwent CT AP which   
also indicated a perianal abscess up to 4cm, however he states the perianal   
region is stably draining and he denies pain to the area. His BMs range between   
constipation and diarrhea and he continues to have difficulty following up with   
his GI in [place], stating that it is difficult to get appts and that   
sometimes when he is there he is not having any symptoms    
    
    
    
Review of Systems    
Review of Systems    
ROS Unobtainable: All systems reviewed & no additional complaints except as   
documented    
    
Meds    
Home Medications and Allergies    
                                Home Medications    
    
    
    
?Medication ?Instructions ?Recorded ?Confirmed ?Type    
     
                          SALOFALK                  500 mg PO 08/01/24 01/13/25     
History    
     
                          diphenoxylate-atropine 2.5 See Rx Instructions .Route     
08/21/24 01/13/25 History    
    
                          mg-0.025 mg tablet        .COMPLEX PRN Diarrhea       
     
                          mesalamine 500 mg capsule,extended 1,000 mg PO BID 08/ 28/24 01/13/25 History    
    
                                        release ([facility])        
    
    
    
                                    Allergies    
    
    
    
Allergy/AdvReac Type Severity Reaction Status Date / Time    
     
sulfamethizole Allergy Mild Rash Verified 01/13/25 14:59    
    
    
    
    
Exam    
Vital Signs    
    
                                            
    
    
    
Temp Pulse Resp BP Pulse Ox O2 Del Method    
     
 96.8 F   60   16   105/67   98   Room Air     
     
 03/10/25 11:06  03/10/25 11:06  03/10/25 11:06  03/10/25 11:06  03/10/25 11:06   
03/10/25 11:06    
    
    
    
    
Constitutional    
Constitutional: no acute distress    
Routine Respiratory Exam    
Respiratory: Present no resp distress    
Routine Rectal Exam    
Comments:     
right perianal chronic wound with purulent drainage, no erythema, no fluctuance   
or tenderness    
    
Results    
Results: Laboratory    
Laboratory results: results reviewed    
Results: Imaging    
CT scan - abdomen: report reviewed    
    
Assessment & Plan    
Plan    
49M with Crohn's who initially presented with a perianal abscess and associated   
fistula s/p EUA with seton placement 12/2024, seton removed 8/20/2024, admitted   
with pyelonephritis. On exam he does not have signs of an abscess but what is   
most likely being represented on the CT is his chronic perianal fistula which   
does not need surgical intervention for now    
    
Appreciate GI recs    
Follow up with GI as outpt

## 2025-03-11 VITALS
TEMPERATURE: 97.52 F | SYSTOLIC BLOOD PRESSURE: 93 MMHG | OXYGEN SATURATION: 97 % | RESPIRATION RATE: 16 BRPM | DIASTOLIC BLOOD PRESSURE: 54 MMHG | HEART RATE: 54 BPM

## 2025-03-11 VITALS
RESPIRATION RATE: 16 BRPM | DIASTOLIC BLOOD PRESSURE: 63 MMHG | HEART RATE: 60 BPM | OXYGEN SATURATION: 100 % | TEMPERATURE: 96.8 F | SYSTOLIC BLOOD PRESSURE: 108 MMHG

## 2025-03-11 VITALS
DIASTOLIC BLOOD PRESSURE: 68 MMHG | HEART RATE: 59 BPM | RESPIRATION RATE: 16 BRPM | TEMPERATURE: 97.4 F | OXYGEN SATURATION: 98 % | SYSTOLIC BLOOD PRESSURE: 112 MMHG

## 2025-03-11 VITALS — OXYGEN SATURATION: 97 %

## 2025-03-11 VITALS
RESPIRATION RATE: 16 BRPM | OXYGEN SATURATION: 97 % | HEART RATE: 58 BPM | DIASTOLIC BLOOD PRESSURE: 65 MMHG | SYSTOLIC BLOOD PRESSURE: 108 MMHG | TEMPERATURE: 97.6 F

## 2025-03-11 LAB
ALANINE AMINOTRANSFERASE: < 7 U/L (ref 10–49)
ALBUMIN, SERUM: 3.4 GM/DL (ref 3.5–5)
ALBUMIN/GLOBULIN RATIO: 1.3 (ref 1.2–2.2)
ALKALINE PHOSPHATASE: 55 U/L (ref 46–116)
ANION GAP: 8 (ref 7–16)
ASPARTATE AMINO TRANSFERASE: < 8 U/L (ref 0–34)
BASOPHILS # (AUTO): 0 THOU/MM3 (ref 0–0.2)
BASOPHILS % (AUTO): 0 % (ref 0–2.5)
BILIRUBIN,TOTAL: 0.3 MG/DL (ref 0.3–1.2)
BLOOD UREA NITROGEN: 7 MG/DL (ref 9–23)
BUN/CREATININE RATIO: 9 RATIO (ref 12–20)
CALCIUM (CORRECTED): 9.4 MG/DL (ref 8.5–10.1)
CALCIUM: 8.9 MG/DL (ref 8.3–10.6)
CARBON DIOXIDE: 27.4 MMOL/L (ref 20–31)
CHLORIDE: 106 MMOL/L (ref 98–107)
CREAT CL PREDICTED SERPL C-G-VRATE: 85 ML/MIN (ref 60–?)
CREATININE (COMPONENT): 0.8 MG/DL (ref 0.6–1.3)
EGFR: > 60 SEE NOTE
EOSINOPHILS # (AUTO): 0 THOU/MM3 (ref 0–0.5)
EOSINOPHILS % (AUTO): 0 % (ref 0–10)
GLOBULIN: 2.6 GM/DL (ref 2.3–3.5)
GLUCOSE: 139 MG/DL (ref 74–106)
HEMATOCRIT: 31.4 % (ref 41–53)
HEMOGLOBIN: 9.2 G/DL (ref 13.5–16)
IMM GRANULOCYTES # BLD AUTO: 0.01 THOU/MM3 (ref 0–0)
IMMATURE GRANULOCYTES % (AUTO): 0 % (ref 0–0)
LYMPHOCYTES # (AUTO): 0.9 THOU/MM3 (ref 1–4.8)
LYMPHOCYTES % (AUTO): 28 % (ref 10–50)
MAGNESIUM: 2.1 MG/DL (ref 1.6–2.6)
MEAN CORPUSCULAR HEMOGLOBIN: 20.3 PG (ref 25–35)
MEAN CORPUSCULAR HGB CONC: 29.3 G/DL (ref 31–37)
MEAN CORPUSCULAR VOLUME: 69 FL (ref 80–100)
MONOCYTES # (AUTO): 0 THOU/MM3 (ref 0–0.8)
MONOCYTES % (AUTO): 1 % (ref 0–12)
NEUTROPHILS # (AUTO): 2.2 THOU/MM3 (ref 1.8–7.7)
NEUTROPHILS % (AUTO): 71 % (ref 37–80)
NRBC BLD-RTO: 0 /100 WBC
NUCLEATED RED BLOOD CELL #: 0 THOU/MM3 (ref 0–0)
OSMOLALITY,CALCULATED: 281 (ref 275–295)
PHOSPHOROUS: 5.5 MG/DL (ref 2.4–5.1)
PLATELET COUNT: 303 THOU/MM3 (ref 140–440)
POTASSIUM: 4.2 MMOL/L (ref 3.4–5.1)
RDW STANDARD DEVIATION: 43.4 FL (ref 35.1–43.9)
RED BLOOD COUNT: 4.54 MILN/MM3 (ref 4.5–5.9)
SODIUM: 141 MMOL/L (ref 136–145)
TOTAL PROTEIN: 6 GM/DL (ref 5.7–8.2)
WHITE BLOOD COUNT: 3.1 THOU/MM3 (ref 3.8–10.6)

## 2025-03-11 RX ADMIN — MESALAMINE 1000 MG: 250 CAPSULE ORAL at 10:43

## 2025-03-11 NOTE — PD.IMPROG
Documentation for date of:
03/11/25


Subjective
Subjective
Interval history: 
Late entry for the note
Case discussed with internal medicine team okay to discharge patient


Exam
Vital Signs



Temp Pulse Resp BP Pulse Ox O2 Del Method O2 Flow Rate
 97.6 F   58 L  16   108/65   97   Room Air   2 
 03/11/25 11:48  03/11/25 11:48  03/11/25 11:48  03/11/25 11:48  03/11/25 11:48  03/11/25 11:48  03/10/25 18:46




Objective
Labs

03/11/25 05:19          

03/11/25 05:19          

Labs: 

Laboratory Results - last 24 hr

  03/11/25
  05:19
WBC  3.1 L
RBC  4.54
Hgb  9.2 L
Hct  31.4 L
MCV  69 L
MCH  20.3 L
MCHC  29.3 L
RDW Std Deviation  43.4
Plt Count  303
Neut % (Auto)  71
Lymph % (Auto)  28
Mono % (Auto)  1
Eos % (Auto)  0
Baso % (Auto)  0
Neut # (Auto)  2.2
Lymph # (Auto)  0.9 L
Mono # (Auto)  0.0
Eos # (Auto)  0.0
Baso # (Auto)  0.0
Immature Gran # (Auto)  0.01 H
Absolute Nucleated RBC  0.00
Immature Gran %  0
Nucleated RBC %  0
Sodium  141
Potassium  4.2  D
Chloride  106
Carbon Dioxide  27.4
Anion Gap  8
BUN  7 L
Creatinine  0.8
Estim Creat Clear Calc  85.0
eGFR  > 60
BUN/Creatinine Ratio  9 L
Glucose  139 H
Calculated Osmolality  281
Calcium  8.9
Corrected Calcium  9.4
Phosphorus  5.5 H
Magnesium  2.1
Total Bilirubin  0.3
AST  < 8
ALT  < 7 L
Alkaline Phosphatase  55
Total Protein  6.0
Albumin  3.4 L
Globulin  2.6
Albumin/Globulin Ratio  1.3



Impressions
Impression: 
Esophageal ulceration okay to discharge patient home to be followed by the PCP on a PPI


Assessment & Plan
A&P Narrative
# Persistent hiccups with nausea vomiting
Patient may have a history of gastroesophageal disease with multiple ulcers distal esophagus

Plan
Consent obtained for fiberoptic esophagogastroduodenoscopy with possible biopsy possible therapeutic intervention under intravenous moderate sedation scheduled for tomorrow
N.p.o. midnight tonight except p.o. meds

# Crohn's disease with perianal fistula and perianal abscess
Solu-Medrol 40 mg IV push every 12
Continue azathioprine as well as mesalamine along with Humira
He can follow-up with his gastroenterologist in [place] upon discharge Dr. Grove
Recommend consultation with Dr. Ritchie for the perianal abscess and fistula may need surgical intervention

Thank you once again for the opportunity to participate in the care of this patient
Time Spent With Patient
Time: 
Total time spent is greater than 50% in coordination of care (as documented) at patient's floor/unit and/or counseling patient:

## 2025-03-11 NOTE — PD.RESDS
Planned Discharge Date
03/11/25


DS: Providers
Provider
Date of admission: 
03/08/25 14:30

Primary care physician: 
Tretn Daniels MD

Admitting Provider: 
Chris Arrieta DO

Attending Provider on Admission: 
Chris Arrieta DO

Consults: 


03/08/25 18:19
[facility] Referral - Nutrition Routine 
 Comment: Positive screening for nutrition needs.

03/09/25 12:12
Consult to Gastroenterology Routine 
 Comment: Crohns
 Consulting Provider: Tonio De La Torre

03/09/25 15:11
Consult to General Surgery Routine 
 Comment: 
 Consulting Provider: Roxie Ritchie



Attending Provider on DC: 
Reinier Gale MD

Discharging Provider: 
Reinier Gale MD


DS: Diagnosis
Problem List Completed
Was Problem List Reviewed/Reconciled?: Yes

Hospital Course
Hospital Course
Hospital course: 
Dave Batista is a 48 year-old Maltese-speaking male with history of Crohn's disease (diagnosed July 2024) who presented to the ED complaining of generalized weakness and hiccups for the past 1 week.  States he has a known history of IBD and has 
been feeling ill ever since the diagnosis with worsening intermittent symptoms.  States non-stop hiccups are the most bothersome to him and he is unable to eat very much due to early satiety and is compliant with his medications.  He reports having 
a perianal abscess which has been draining for a while causing a perianal fistula.  For the past 1 week, he has been having increased bruising, fever, chills and feeling cold in his hands and feet and unable to eat very much.  Also had off and on 
formed stool with episodes of diarrhea and mucus, but denies melena or hematochezia.  Follows the [facility] and has been compliant with all his medications and followed by gastroenterologist, Dr. Grove, in [place].  He is on 
azathioprine 50 mg daily, mesalamine 1.2 g QID, prednisone 60 mg daily, and adalimumab/Humira 80 mg injections q2 weeks.  Imaging showed acute right-sided pyelonephritis and perianal abscess, measuring 3 x 4 cm.  General surgery was consulted and 
stated that abscess was too small to drain.  Urine cultures did not grow any bacteria, but IV antibiotics continued.

GI consulted and patient underwent EGD to evaluate for possible GERD or PUD as etiology of hiccups.  Underwent EGD on 03/10 that showed esophageal ulcers, esophagitis, and gastritis.  Biopsies taken of lower and upper third of esophagus.  
Recommended protonix 40 mg PO BID and promethazine 12.5 mg PO QID.  Patient can then follow-up at [facility] and Dr. Grove outpatient.  Otherwise, no acute events throughout hospital stay.  Vital signs remained stable and labs reviewed and unremarkable.  
He was also instructed to continue taking his medications for his Crohn's disease and to take Augmentin for 4 more days to complete antibiotic course for pyelonephritis.

Diagnoses during admission:
#Crohn's disease
#Perianal fistula
#Abdominal pain
#Acute pyelonephritis
#UTI
#Esophageal ulcer
#Microcytic anemia

Discharge instructions:
Take Augmentin 875 mg twice daily for 5 more days to complete antibiotic course
Take azathioprine 50 mg once daily, Protonix 40 mg twice daily, promethazine 12.5 mg 4 times daily as prescribed
Take prednisone 30 mg for 7 days, 25 mg for next 7 days, 20 mg for next 7 days, 15 mg for next 7 days, 10 mg for next 7 days and 5 mg for next 7 days to complete steroid taper course for Crohn's disease
Take diphenoxylate?atropine as needed
Follow-up with PCP as outpatient within 2 weeks
Follow-up with GI specialist, as outpatient within 2 weeks
In case of emergency, call 911 or come back to the ED
Time Spent with Patient
Time attestation: 
Total time spent providing and/or coordinating discharge services:


Exam
Vital Signs


Temp Pulse Resp BP Pulse Ox O2 Del Method O2 Flow Rate
 97.6 F   58 L  16   108/65   97   Room Air   2 
 03/11/25 11:48  03/11/25 11:48  03/11/25 11:48  03/11/25 11:48  03/11/25 11:48  03/11/25 11:48  03/10/25 18:46


Narrative Exam
General: AOx3, laying comfortably in bed, able to speak full sentences
HEENT: NC/AT, mucous membranes moist, bilateral sclera anicteric
Cardiovascular: regular rate and rhythm, S1/S2 present, no murmurs appreciated
Pulmonary: clear to auscultation bilaterally, no rales/rhonchi/wheezes
Abdominal: tenderness to palpation in lower quadrants, soft, nondistended, no guarding
Musculoskeletal: normal ROM, no peripheral edema
Skin: warm and dry, intact, no rashes
Neuro: CN II-XII intact, no focal deficits

Discharge Plan
Plan
Patient Disposition: HOME (Self Care)

Care Plan Goals:
Take Augmentin 875 mg twice daily for 5 more days to complete antibiotic course
Take azathioprine 50 mg once daily, Protonix 40 mg twice daily, promethazine 12.5 mg 4 times daily as prescribed
Take prednisone 30 mg for 7 days, 25 mg for next 7 days, 20 mg for next 7 days, 15 mg for next 7 days, 10 mg for next 7 days and 5 mg for next 7 days to complete steroid taper course for Crohn's disease
Take diphenoxylate?atropine as needed
Follow-up with PCP as outpatient within 2 weeks
Follow-up with GI specialist, as outpatient within 2 weeks
In case of emergency, call 911 or come back to the ED

[place] Augmentin 875 mg dos veces al d?a jillian 5 d?as m?s para completar el tratamiento con antibi?ticos
[place] azatioprina 50 mg nohemy vez al d?a, Protonix 40 mg dos veces al d?a, prometazina 12,5 mg 4 veces al d?a seg?n lo prescrito
[place] prednisona 30 mg jillian 7 d?as, 25 mg jillian los siguientes 7 d?as, 20 mg jillian los siguientes 7 d?as, 15 mg jillian los siguientes 7 d?as, 10 mg jillian los siguientes 7 d?as y 5 mg jillian los siguientes 7 d?as para completar el 
tratamiento de reducci?n gradual de esteroides para la enfermedad de Crohn
[place] difenoxilato-atropina seg?n sea necesario
Seguimiento con el m?dico de atenci?n primaria shawna paciente ambulatorio dentro de las 2 semanas
Seguimiento con el especialista en gastroenterolog?a, shawna paciente ambulatorio dentro de las 2 semanas
En marco de emergencia, llame al 911 o regrese al departamento de emergencias



Prescriptions/Referrals
Prescriptions/Med Rec:
New
  pantoprazole [Protonix] 40 mg tablet,delayed release (DR/EC) 
   40 mg PO Q12H Qty: 60 0RF
  promethazine 12.5 mg tablet 
   12.5 mg PO QID 30 Days Qty: 120 0RF
  amoxicillin-pot clavulanate 875-125 mg tablet 
   1 tab PO Q12H 5 Days Qty: 10 0RF
  azathioprine 50 mg Tablet 
   50 mg PO QDAY 30 Days Qty: 30 0RF
  prednisone 5 mg tablet 
   See Taper  PO QDAY Qty: 147 0RF
   Taper: Prednisone Taper
   30 mg DAILY for 7 Days and 0 Hour
   25 mg DAILY for 7 Days and 0 Hour
   20 mg DAILY for 7 Days and 0 Hour
   15 mg DAILY for 7 Days
   10 mg DAILY for 7 Days
   5 mg DAILY for 7 Days
  mesalamine 500 mg capsule, extended release 
   1,000 mg PO BID Qty: 90 0RF

Continued
  diphenoxylate-atropine 2.5-0.025 mg tablet 
   See Rx Instructions  .ROUTE .COMPLEX PRN (Reason: Diarrhea) 
   Patient Comments:
   TAKE 1-2 TABLETS BY MOUTH EVERY 8 HOURS AS NEEDED DIARRHEA MAX 8 TABS/24 HRS
   Rx Instructions:
   Take 1-2 tablets by mouth every 8 hours as needed for diarrhea

Discontinued
  ibuprofen 400 mg tablet 
   400 mg PO ONCE Qty: 1 0RF
  ibuprofen 600 mg tablet 
   600 mg PO ONCE Qty: 1 0RF
  ibuprofen 600 mg tablet 
   600 mg PO Q6H PRN (Reason: pain) Qty: 30 0RF
  SALOFALK   
   500 mg PO  
  mesalamine [Pentasa] 500 mg capsule, extended release 
   1,000 mg PO BID 
  ciprofloxacin HCl [Cipro] 500 mg tablet 
   500 mg PO BID Qty: 14 0RF

Referrals:
Trent Daniels MD [Primary Care Provider] - 

Patient/Caregiver Discharge Instructions
Other Discharge Activity Instructions:: Please return to the emergency department for any worsening or persistent symptoms 
Please see above for all medication changes, will continue your previous dosages of prednisone, mesalamine and azathioprine for UC that your gastroenterologist has prescribed 
Please see your primary care physician within 7 to 10 days of discharge 
 
Por favor, regrese al departamento de emergencias si los s?ntomas empeoran o persisten 
Por favor, consulte m?s arriba todos los cambios de medicaci?n, continuar? con las dosis anteriores de prednisona, mesalamina y azatioprina para la colitis ulcerosa que le haya recetado hsu gastroenter?logo 
Por favor, consulte a hsu m?dico de atenci?n primaria dentro de los 7 a 10 d?as posteriores al muna 


Education Materials:  Low-Fiber Diet, Discharge Instructions for ..., Understanding Colitis

Print Language: Maltese

Stand Alone Forms:  June Award Info., Patient Portal Info Letter

Discharge Order
Discharge Orders:
Discharge  (Routine); Ordered 03/11/25
   Ordered By:  Chris Arrieta


Quality Discharge
Quality Measures
VTE prophylaxis

MD Attestestation
MD Attestation
I have discussed and was present for the essential components of the discharge history, physical examination, diagnosis, and discharge treatment plan with the resident. I agree with the patient's discharge care as documented by the resident and 
amended herein by me. Enrike Arrieta, DO.

The patient understood all discharge instructions, all questions were answered satisfactorily.  The patient was instructed to return to the Emergency Department is symptoms worsened or persisted.  Patient will be discharged with his previous 
regiment for Crohn's disease.  Will also be discharged with a short course of Augmentin 875 every 12 hours for 5 days.  Protonix every 12 hours and Reglan has also been added.  Patient will need to follow-up with his gastroenterologist in 
[place] within the next month for further workup and evaluation.  Patient was stable, afebrile, tolerating p.o. intake and ambulatory at time of discharge home.

Although this document has been carefully reviewed, there may still be some phonetic and other typographical errors.  These errors are purely grammatical due to imperfections in the software program and should not be construed in any way to 
compromise the substance of the patient's medical care during this visit.

## 2025-03-11 NOTE — ESPR_ITS
Documentation for date of:    
03/11/25    
    
    
Subjective    
Subjective    
Interval history:     
Late entry for the note    
Case discussed with internal medicine team okay to discharge patient    
    
    
Exam    
Vital Signs    
    
                                            
    
    
    
Temp Pulse Resp BP Pulse Ox O2 Del Method O2 Flow Rate    
     
 97.6 F   58 L  16   108/65   97   Room Air   2     
     
 03/11/25 11:48  03/11/25 11:48  03/11/25 11:48  03/11/25 11:48  03/11/25 11:48   
03/11/25 11:48  03/10/25 18:46    
    
    
    
    
    
Objective    
Labs    
    
                                                        03/11/25 05:19              
    
                                                        03/11/25 05:19              
    
Labs:     
    
                         Laboratory Results - last 24 hr    
    
    
    
  03/11/25    
    
  05:19    
     
WBC  3.1 L    
     
RBC  4.54    
     
Hgb  9.2 L    
     
Hct  31.4 L    
     
MCV  69 L    
     
MCH  20.3 L    
     
MCHC  29.3 L    
     
RDW Std Deviation  43.4    
     
Plt Count  303    
     
Neut % (Auto)  71    
     
Lymph % (Auto)  28    
     
Mono % (Auto)  1    
     
Eos % (Auto)  0    
     
Baso % (Auto)  0    
     
Neut # (Auto)  2.2    
     
Lymph # (Auto)  0.9 L    
     
Mono # (Auto)  0.0    
     
Eos # (Auto)  0.0    
     
Baso # (Auto)  0.0    
     
Immature Gran # (Auto)  0.01 H    
     
Absolute Nucleated RBC  0.00    
     
Immature Gran %  0    
     
Nucleated RBC %  0    
     
Sodium  141    
     
Potassium  4.2  D    
     
Chloride  106    
     
Carbon Dioxide  27.4    
     
Anion Gap  8    
     
BUN  7 L    
     
Creatinine  0.8    
     
Estim Creat Clear Calc  85.0    
     
eGFR  > 60    
     
BUN/Creatinine Ratio  9 L    
     
Glucose  139 H    
     
Calculated Osmolality  281    
     
Calcium  8.9    
     
Corrected Calcium  9.4    
     
Phosphorus  5.5 H    
     
Magnesium  2.1    
     
Total Bilirubin  0.3    
     
AST  < 8    
     
ALT  < 7 L    
     
Alkaline Phosphatase  55    
     
Total Protein  6.0    
     
Albumin  3.4 L    
     
Globulin  2.6    
     
Albumin/Globulin Ratio  1.3    
    
    
    
    
Impressions    
Impression:     
Esophageal ulceration okay to discharge patient home to be followed by the PCP   
on a PPI    
    
    
Assessment & Plan    
A&P Narrative    
# Persistent hiccups with nausea vomiting    
Patient may have a history of gastroesophageal disease with multiple ulcers   
distal esophagus    
    
Plan    
Consent obtained for fiberoptic esophagogastroduodenoscopy with possible biopsy   
possible therapeutic intervention under intravenous moderate sedation scheduled   
for tomorrow    
N.p.o. midnight tonight except p.o. meds    
    
# Crohn's disease with perianal fistula and perianal abscess    
Solu-Medrol 40 mg IV push every 12    
Continue azathioprine as well as mesalamine along with Humira    
He can follow-up with his gastroenterologist in [place] upon discharge Dr. Grove    
Recommend consultation with Dr. Ritchie for the perianal abscess and fistula may   
need surgical intervention    
    
Thank you once again for the opportunity to participate in the care of this   
patient    
Time Spent With Patient    
Time:     
Total time spent is greater than 50% in coordination of care (as documented) at   
patient's floor/unit and/or counseling patient:

## 2025-03-11 NOTE — ESDS_ITS
<Statement entered by Demarco Winston MD - 03/11/25 16:09>    
I saw and examined the patient, and I agree with current management stated by Dr Shalha MD,PGY1.    
Plan of care was discussed with the attending physician and resident physician.    
    
Disclaimer: Despite multiple revisions, due to the dictation software being   
used, the document bellow may not be free of grammatical errors including   
phonetic/typographic errors. However, this does not deter from our commitment to  
providing health care in the patient's best interest in mind.    
    
Dr. Catrachito MD, PGY 2    
    
Planned Discharge Date    
03/11/25    
    
    
DS: Providers    
Provider    
Date of admission:     
03/08/25 14:30    
    
Primary care physician:     
Trent Daniels MD    
    
Admitting Provider:     
Chris Arrieta DO    
    
Attending Provider on Admission:     
Chris Arrieta DO    
    
Consults:     
                                            
    
03/08/25 18:19    
[facility] Referral - Nutrition Routine     
   Comment: Positive screening for nutrition needs.    
    
03/09/25 12:12    
Consult to Gastroenterology Routine     
   Comment: Crohns    
   Consulting Provider: Tonio De La Torre    
    
03/09/25 15:11    
Consult to General Surgery Routine     
   Comment:     
   Consulting Provider: Roxie Ritchie    
    
    
    
Attending Provider on DC:     
Reinier Gale MD    
    
Discharging Provider:     
Reinier Gale MD    
    
    
DS: Diagnosis    
Problem List Completed    
Was Problem List Reviewed/Reconciled?: Yes    
    
Hospital Course    
Hospital Course    
Hospital course:     
Dave Batista is a 48 year-old Setswana-speaking male with history of Crohn's   
disease (diagnosed July 2024) who presented to the ED complaining of generalized  
weakness and hiccups for the past 1 week.  States he has a known history of IBD   
and has been feeling ill ever since the diagnosis with worsening intermittent   
symptoms.  States non-stop hiccups are the most bothersome to him and he is   
unable to eat very much due to early satiety and is compliant with his   
medications.  He reports having a perianal abscess which has been draining for a  
while causing a perianal fistula.  For the past 1 week, he has been having   
increased bruising, fever, chills and feeling cold in his hands and feet and   
unable to eat very much.  Also had off and on formed stool with episodes of   
diarrhea and mucus, but denies melena or hematochezia.  Follows the [facility] and has been compliant with all his medications and followed by   
gastroenterologist, Dr. Grove, in [place].  He is on azathioprine 50 mg   
daily, mesalamine 1.2 g QID, prednisone 60 mg daily, and adalimumab/Humira 80 mg  
injections q2 weeks.  Imaging showed acute right-sided pyelonephritis and   
perianal abscess, measuring 3 x 4 cm.  General surgery was consulted and stated   
that abscess was too small to drain.  Urine cultures did not grow any bacteria,   
but IV antibiotics continued.    
    
GI consulted and patient underwent EGD to evaluate for possible GERD or PUD as   
etiology of hiccups.  Underwent EGD on 03/10 that showed esophageal ulcers,   
esophagitis, and gastritis.  Biopsies taken of lower and upper third of   
esophagus.  Recommended protonix 40 mg PO BID and promethazine 12.5 mg PO QID.    
Patient can then follow-up at [facility] and Dr. Grove outpatient.  Otherwise, no acute  
events throughout hospital stay.  Vital signs remained stable and labs reviewed   
and unremarkable.  He was also instructed to continue taking his medications for  
his Crohn's disease and to take Augmentin for 4 more days to complete antibiotic  
course for pyelonephritis.    
    
Diagnoses during admission:    
#Crohn's disease    
#Perianal fistula    
#Abdominal pain    
#Acute pyelonephritis    
#UTI    
#Esophageal ulcer    
#Microcytic anemia    
    
Discharge instructions:    
Take Augmentin 875 mg twice daily for 5 more days to complete antibiotic course    
Take azathioprine 50 mg once daily, Protonix 40 mg twice daily, promethazine   
12.5 mg 4 times daily as prescribed    
Take prednisone 30 mg for 7 days, 25 mg for next 7 days, 20 mg for next 7 days,   
15 mg for next 7 days, 10 mg for next 7 days and 5 mg for next 7 days to   
complete steroid taper course for Crohn's disease    
Take diphenoxylate?atropine as needed    
Follow-up with PCP as outpatient within 2 weeks    
Follow-up with GI specialist, as outpatient within 2 weeks    
In case of emergency, call 911 or come back to the ED    
Time Spent with Patient    
Time attestation:     
Total time spent providing and/or coordinating discharge services:    
    
    
Exam    
Vital Signs    
                                            
    
    
    
Temp Pulse Resp BP Pulse Ox O2 Del Method O2 Flow Rate    
     
 97.6 F   58 L  16   108/65   97   Room Air   2     
     
 03/11/25 11:48  03/11/25 11:48  03/11/25 11:48  03/11/25 11:48  03/11/25 11:48   
03/11/25 11:48  03/10/25 18:46    
    
    
    
Narrative Exam    
General: AOx3, laying comfortably in bed, able to speak full sentences    
HEENT: NC/AT, mucous membranes moist, bilateral sclera anicteric    
Cardiovascular: regular rate and rhythm, S1/S2 present, no murmurs appreciated    
Pulmonary: clear to auscultation bilaterally, no rales/rhonchi/wheezes    
Abdominal: tenderness to palpation in lower quadrants, soft, nondistended, no   
guarding    
Musculoskeletal: normal ROM, no peripheral edema    
Skin: warm and dry, intact, no rashes    
Neuro: CN II-XII intact, no focal deficits    
    
Discharge Plan    
Plan    
Patient Disposition: HOME (Self Care)    
    
Care Plan Goals:    
Take Augmentin 875 mg twice daily for 5 more days to complete antibiotic course    
Take azathioprine 50 mg once daily, Protonix 40 mg twice daily, promethazine 12  
.5 mg 4 times daily as prescribed    
Take prednisone 30 mg for 7 days, 25 mg for next 7 days, 20 mg for next 7 days,   
15 mg for next 7 days, 10 mg for next 7 days and 5 mg for next 7 days to   
complete steroid taper course for Crohn's disease    
Take diphenoxylate?atropine as needed    
Follow-up with PCP as outpatient within 2 weeks    
Follow-up with GI specialist, as outpatient within 2 weeks    
In case of emergency, call 911 or come back to the ED    
    
[place] Augmentin 875 mg dos veces al d?a jillian 5 d?as m?s para completar el   
tratamiento con antibi?ticos    
[place] azatioprina 50 mg nohemy vez al d?a, Protonix 40 mg dos veces al d?a,   
prometazina 12,5 mg 4 veces al d?a seg?n lo prescrito    
[place] prednisona 30 mg jillian 7 d?as, 25 mg jillian los siguientes 7 d?as, 20 mg  
jillian los siguientes 7 d?as, 15 mg jillian los siguientes 7 d?as, 10 mg   
jillian los siguientes 7 d?as y 5 mg jillian los siguientes 7 d?as para   
completar el tratamiento de reducci?n gradual de esteroides para la enfermedad   
de Crohn    
[place] difenoxilato-atropina seg?n sea necesario    
Seguimiento con el m?dico de atenci?n primaria shawna paciente ambulatorio dentro   
de las 2 semanas    
Seguimiento con el especialista en gastroenterolog?a, shawna paciente ambulatorio   
dentro de las 2 semanas    
En marco de emergencia, llame al 911 o regrese al departamento de emergencias    
    
    
    
Prescriptions/Referrals    
Prescriptions/Med Rec:    
New    
  pantoprazole [Protonix] 40 mg tablet,delayed release (DR/EC)     
   40 mg PO Q12H Qty: 60 0RF    
  promethazine 12.5 mg tablet     
   12.5 mg PO QID 30 Days Qty: 120 0RF    
  amoxicillin-pot clavulanate 875-125 mg tablet     
   1 tab PO Q12H 5 Days Qty: 10 0RF    
  azathioprine 50 mg Tablet     
   50 mg PO QDAY 30 Days Qty: 30 0RF    
  prednisone 5 mg tablet     
   See Taper  PO QDAY Qty: 147 0RF    
   Taper: Prednisone Taper    
   30 mg DAILY for 7 Days and 0 Hour    
   25 mg DAILY for 7 Days and 0 Hour    
   20 mg DAILY for 7 Days and 0 Hour    
   15 mg DAILY for 7 Days    
   10 mg DAILY for 7 Days    
   5 mg DAILY for 7 Days    
  mesalamine 500 mg capsule, extended release     
   1,000 mg PO BID Qty: 90 0RF    
    
Continued    
  diphenoxylate-atropine 2.5-0.025 mg tablet     
   See Rx Instructions  .ROUTE .COMPLEX PRN (Reason: Diarrhea)     
   Patient Comments:    
   TAKE 1-2 TABLETS BY MOUTH EVERY 8 HOURS AS NEEDED DIARRHEA MAX 8 TABS/24 HRS    
   Rx Instructions:    
   Take 1-2 tablets by mouth every 8 hours as needed for diarrhea    
    
Discontinued    
  ibuprofen 400 mg tablet     
   400 mg PO ONCE Qty: 1 0RF    
  ibuprofen 600 mg tablet     
   600 mg PO ONCE Qty: 1 0RF    
  ibuprofen 600 mg tablet     
   600 mg PO Q6H PRN (Reason: pain) Qty: 30 0RF    
  SALOFALK       
   500 mg PO      
  mesalamine [Pentasa] 500 mg capsule, extended release     
   1,000 mg PO BID     
  ciprofloxacin HCl [Cipro] 500 mg tablet     
   500 mg PO BID Qty: 14 0RF    
    
Referrals:    
Trent Daniels MD [Primary Care Provider] -     
    
Patient/Caregiver Discharge Instructions    
Other Discharge Activity Instructions:: Please return to the emergency   
department for any worsening or persistent symptoms     
Please see above for all medication changes, will continue your previous dosages  
of prednisone, mesalamine and azathioprine for UC that your gastroenterologist   
has prescribed     
Please see your primary care physician within 7 to 10 days of discharge     
     
Por favor, regrese al departamento de emergencias si los s?ntomas empeoran o   
persisten     
Por favor, consulte m?s arriba todos los cambios de medicaci?n, continuar? con   
las dosis anteriores de prednisona, mesalamina y azatioprina para la colitis   
ulcerosa que le haya recetado hsu gastroenter?logo     
Por favor, consulte a hsu m?dico de atenci?n primaria dentro de los 7 a 10 d?as   
posteriores al muna     
    
    
Education Materials:  Low-Fiber Diet, Discharge Instructions for ...,   
Understanding Colitis    
    
Print Language: Setswana    
    
Stand Alone Forms:  [employer] Info., Patient Portal Info Letter    
    
Discharge Order    
Discharge Orders:    
Discharge  (Routine); Ordered 03/11/25    
   Ordered By:  Chris Arrieta    
    
    
Quality Discharge    
Quality Measures    
VTE prophylaxis    
    
MD Attestestation    
MD Attestation    
I have discussed and was present for the essential components of the discharge   
history, physical examination, diagnosis, and discharge treatment plan with the   
resident. I agree with the patient's discharge care as documented by the   
resident and amended herein by me. Enrike Arrieta, DO.    
    
The patient understood all discharge instructions, all questions were answered   
satisfactorily.  The patient was instructed to return to the Emergency   
Department is symptoms worsened or persisted.  Patient will be discharged with   
his previous regiment for Crohn's disease.  Will also be discharged with a short  
course of Augmentin 875 every 12 hours for 5 days.  Protonix every 12 hours and   
Reglan has also been added.  Patient will need to follow-up with his   
gastroenterologist in [place] within the next month for further workup and   
evaluation.  Patient was stable, afebrile, tolerating p.o. intake and ambulatory  
at time of discharge home.    
    
Although this document has been carefully reviewed, there may still be some   
phonetic and other typographical errors.  These errors are purely grammatical   
due to imperfections in the software program and should not be construed in any   
way to compromise the substance of the patient's medical care during this visit.

## 2025-03-14 ENCOUNTER — HOSPITAL ENCOUNTER (OUTPATIENT)
Dept: HOSPITAL 104 - SWHD | Age: 50
Discharge: HOME | End: 2025-03-14
Payer: MEDICAID

## 2025-03-14 DIAGNOSIS — L98.412: ICD-10-CM
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
## 2025-03-14 DIAGNOSIS — S31.819D: Primary | ICD-10-CM

## 2025-03-14 DIAGNOSIS — D64.9: ICD-10-CM

## 2025-03-14 DIAGNOSIS — K50.90: ICD-10-CM

## 2025-03-14 DIAGNOSIS — L92.9: ICD-10-CM

## 2025-03-14 DIAGNOSIS — N63.0: ICD-10-CM

## 2025-03-14 PROCEDURE — 99213 OFFICE O/P EST LOW 20 MIN: CPT

## 2025-03-14 PROCEDURE — G0463 HOSPITAL OUTPT CLINIC VISIT: HCPCS

## 2025-03-21 ENCOUNTER — HOSPITAL ENCOUNTER (OUTPATIENT)
Age: 50
Discharge: HOME | End: 2025-03-21
Payer: MEDICAID
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
## 2025-03-21 DIAGNOSIS — L92.9: ICD-10-CM

## 2025-03-21 DIAGNOSIS — K50.90: ICD-10-CM

## 2025-03-21 DIAGNOSIS — D64.9: ICD-10-CM

## 2025-03-21 DIAGNOSIS — N63.0: ICD-10-CM

## 2025-03-21 DIAGNOSIS — S31.819D: Primary | ICD-10-CM

## 2025-03-21 DIAGNOSIS — L98.412: ICD-10-CM

## 2025-03-21 PROCEDURE — A9270 NON-COVERED ITEM OR SERVICE: HCPCS

## 2025-03-21 PROCEDURE — G0463 HOSPITAL OUTPT CLINIC VISIT: HCPCS

## 2025-03-21 PROCEDURE — 99213 OFFICE O/P EST LOW 20 MIN: CPT

## 2025-03-30 ENCOUNTER — HOSPITAL ENCOUNTER (INPATIENT)
Dept: HOSPITAL 104 - SERX | Age: 50
LOS: 5 days | Discharge: HOME | DRG: 254 | End: 2025-04-04
Payer: MEDICAID

## 2025-03-30 VITALS
OXYGEN SATURATION: 96 % | RESPIRATION RATE: 18 BRPM | HEART RATE: 63 BPM | DIASTOLIC BLOOD PRESSURE: 70 MMHG | TEMPERATURE: 97.16 F | SYSTOLIC BLOOD PRESSURE: 106 MMHG

## 2025-03-30 VITALS
HEART RATE: 73 BPM | TEMPERATURE: 98.06 F | DIASTOLIC BLOOD PRESSURE: 81 MMHG | RESPIRATION RATE: 15 BRPM | SYSTOLIC BLOOD PRESSURE: 118 MMHG | OXYGEN SATURATION: 99 %

## 2025-03-30 VITALS
RESPIRATION RATE: 18 BRPM | TEMPERATURE: 98.42 F | SYSTOLIC BLOOD PRESSURE: 129 MMHG | DIASTOLIC BLOOD PRESSURE: 84 MMHG | HEART RATE: 89 BPM | OXYGEN SATURATION: 99 %

## 2025-03-30 VITALS
SYSTOLIC BLOOD PRESSURE: 135 MMHG | OXYGEN SATURATION: 99 % | HEART RATE: 85 BPM | RESPIRATION RATE: 16 BRPM | TEMPERATURE: 98.8 F | DIASTOLIC BLOOD PRESSURE: 88 MMHG

## 2025-03-30 VITALS
OXYGEN SATURATION: 100 % | SYSTOLIC BLOOD PRESSURE: 136 MMHG | TEMPERATURE: 98.6 F | RESPIRATION RATE: 18 BRPM | DIASTOLIC BLOOD PRESSURE: 86 MMHG | HEART RATE: 73 BPM

## 2025-03-30 VITALS
TEMPERATURE: 98.42 F | RESPIRATION RATE: 16 BRPM | SYSTOLIC BLOOD PRESSURE: 127 MMHG | OXYGEN SATURATION: 97 % | HEART RATE: 78 BPM | DIASTOLIC BLOOD PRESSURE: 79 MMHG

## 2025-03-30 VITALS
DIASTOLIC BLOOD PRESSURE: 79 MMHG | RESPIRATION RATE: 16 BRPM | OXYGEN SATURATION: 97 % | HEART RATE: 74 BPM | SYSTOLIC BLOOD PRESSURE: 127 MMHG | TEMPERATURE: 98.4 F

## 2025-03-30 VITALS — BODY MASS INDEX: 22.4 KG/M2 | BODY MASS INDEX: 22.1 KG/M2

## 2025-03-30 VITALS
DIASTOLIC BLOOD PRESSURE: 79 MMHG | OXYGEN SATURATION: 98 % | TEMPERATURE: 97.3 F | RESPIRATION RATE: 16 BRPM | SYSTOLIC BLOOD PRESSURE: 118 MMHG | HEART RATE: 63 BPM

## 2025-03-30 VITALS — HEART RATE: 63 BPM

## 2025-03-30 DIAGNOSIS — D63.8: ICD-10-CM

## 2025-03-30 DIAGNOSIS — R31.9: ICD-10-CM

## 2025-03-30 DIAGNOSIS — K50.913: ICD-10-CM

## 2025-03-30 DIAGNOSIS — G89.29: ICD-10-CM

## 2025-03-30 DIAGNOSIS — R82.81: ICD-10-CM

## 2025-03-30 DIAGNOSIS — K61.0: Primary | ICD-10-CM

## 2025-03-30 DIAGNOSIS — B96.20: ICD-10-CM

## 2025-03-30 DIAGNOSIS — N50.811: ICD-10-CM

## 2025-03-30 DIAGNOSIS — N10: ICD-10-CM

## 2025-03-30 DIAGNOSIS — Z87.19: ICD-10-CM

## 2025-03-30 LAB
ALBUMIN/GLOB SERPL: 1.4 {RATIO} (ref 1.2–2.2)
ALP SERPL-CCNC: 83 U/L (ref 46–116)
ALT SERPL-CCNC: 10 U/L (ref 10–49)
ANION GAP SERPL CALC-SCNC: 8 MMOL/L (ref 7–16)
AST SERPL-CCNC: 14 U/L (ref 0–34)
BASOPHILS NFR BLD AUTO: 0 % (ref 0–2.5)
BILIRUB SERPL-MCNC: 1.2 MG/DL (ref 0.3–1.2)
BUN SERPL-MCNC: 12 MG/DL (ref 9–23)
BUN/CREAT SERPL: 17 RATIO (ref 12–20)
CALCIUM ALBUM COR SERPL-MCNC: 8.9 MG/DL (ref 8.5–10.1)
CALCIUM SERPL-MCNC: 8.9 MG/DL (ref 8.3–10.6)
CHLORIDE SERPL-SCNC: 98 MMOL/L (ref 98–107)
CO2 SERPL-SCNC: 27.8 MMOL/L (ref 20–31)
CREAT SERPL-MCNC: 0.7 MG/DL (ref 0.6–1.3)
EGFR: > 60 SEE NOTE
EOSINOPHIL NFR BLD AUTO: 0 % (ref 0–10)
GLOBULIN SER CALC-MCNC: 2.9 GM/DL (ref 2.3–3.5)
GLUCOSE SERPL-MCNC: 151 MG/DL (ref 74–106)
HCT VFR BLD AUTO: 36.6 % (ref 41–53)
HGB BLD-MCNC: 11.1 G/DL (ref 13.5–16)
IMM GRANULOCYTES # BLD AUTO: 0.03 THOU/MM3 (ref 0–0)
IMM GRANULOCYTES NFR BLD AUTO: 0 % (ref 0–0)
LIPASE: 26 U/L (ref 12–53)
LYMPHOCYTES # BLD AUTO: 0.9 THOU/MM3 (ref 1–4.8)
LYMPHOCYTES NFR BLD AUTO: 13 % (ref 10–50)
MCH RBC QN AUTO: 21.1 PG (ref 25–35)
MCHC RBC AUTO-ENTMCNC: 30.3 G/DL (ref 31–37)
MCV RBC AUTO: 69 FL (ref 80–100)
MONOCYTES # BLD AUTO: 0.5 THOU/MM3 (ref 0–0.8)
MONOCYTES NFR BLD AUTO: 7 % (ref 0–12)
NEUTROPHILS # BLD AUTO: 5.8 THOU/MM3 (ref 1.8–7.7)
NEUTROPHILS NFR BLD AUTO: 79 % (ref 37–80)
NRBC BLD-RTO: 0 /100 WBC
OSMOLALITY,CALCULATED: 270 (ref 275–295)
PLATELET COUNT: 260 THOU/MM3 (ref 140–440)
POTASSIUM: 3.9 MMOL/L (ref 3.4–5.1)
PROTEIN,URINE: (no result)
RBC,URINE: 5 /HPF (ref 0–3)
RDW STANDARD DEVIATION: 50.4 FL (ref 35.1–43.9)
RED BLOOD COUNT: 5.27 MILN/MM3 (ref 4.5–5.9)
SODIUM: 134 MMOL/L (ref 136–145)
SPECIFIC GRAVITY,URINE: 1.03 (ref 1–1.03)
SQUAMOUS EPITHELIAL CELL,URINE: 1 /HPF (ref 0–5)
TOTAL PROTEIN: 6.9 GM/DL (ref 5.7–8.2)
TRANSITIONAL EPI CELLS,URINE: 1 /HPF (ref 0–5)
WBC,URINE: 29 /HPF (ref 0–5)
WHITE BLOOD COUNT: 7.3 THOU/MM3 (ref 3.8–10.6)

## 2025-03-30 PROCEDURE — 85025 COMPLETE CBC W/AUTO DIFF WBC: CPT

## 2025-03-30 PROCEDURE — 87070 CULTURE OTHR SPECIMN AEROBIC: CPT

## 2025-03-30 PROCEDURE — 86140 C-REACTIVE PROTEIN: CPT

## 2025-03-30 PROCEDURE — A9270 NON-COVERED ITEM OR SERVICE: HCPCS

## 2025-03-30 PROCEDURE — 74176 CT ABD & PELVIS W/O CONTRAST: CPT

## 2025-03-30 PROCEDURE — 81001 URINALYSIS AUTO W/SCOPE: CPT

## 2025-03-30 PROCEDURE — 83690 ASSAY OF LIPASE: CPT

## 2025-03-30 PROCEDURE — 96375 TX/PRO/DX INJ NEW DRUG ADDON: CPT

## 2025-03-30 PROCEDURE — 87205 SMEAR GRAM STAIN: CPT

## 2025-03-30 PROCEDURE — 77012 CT SCAN FOR NEEDLE BIOPSY: CPT

## 2025-03-30 PROCEDURE — C1769 GUIDE WIRE: HCPCS

## 2025-03-30 PROCEDURE — 96374 THER/PROPH/DIAG INJ IV PUSH: CPT

## 2025-03-30 PROCEDURE — C1729 CATH, DRAINAGE: HCPCS

## 2025-03-30 PROCEDURE — 99285 EMERGENCY DEPT VISIT HI MDM: CPT

## 2025-03-30 PROCEDURE — C1725 CATH, TRANSLUMIN NON-LASER: HCPCS

## 2025-03-30 PROCEDURE — 87186 SC STD MICRODIL/AGAR DIL: CPT

## 2025-03-30 PROCEDURE — 74177 CT ABD & PELVIS W/CONTRAST: CPT

## 2025-03-30 PROCEDURE — 87086 URINE CULTURE/COLONY COUNT: CPT

## 2025-03-30 PROCEDURE — 87077 CULTURE AEROBIC IDENTIFY: CPT

## 2025-03-30 PROCEDURE — 87081 CULTURE SCREEN ONLY: CPT

## 2025-03-30 PROCEDURE — 96376 TX/PRO/DX INJ SAME DRUG ADON: CPT

## 2025-03-30 PROCEDURE — 80053 COMPREHEN METABOLIC PANEL: CPT

## 2025-03-30 PROCEDURE — 74018 RADEX ABDOMEN 1 VIEW: CPT

## 2025-03-30 PROCEDURE — 76000 FLUOROSCOPY <1 HR PHYS/QHP: CPT

## 2025-03-30 PROCEDURE — 36415 COLL VENOUS BLD VENIPUNCTURE: CPT

## 2025-03-30 RX ADMIN — PIPERACILLIN SODIUM AND TAZOBACTAM SODIUM 100 GM: 3; .375 INJECTION, SOLUTION INTRAVENOUS at 15:04

## 2025-03-30 RX ADMIN — PIPERACILLIN SODIUM AND TAZOBACTAM SODIUM 12.5 GM: 3; .375 INJECTION, SOLUTION INTRAVENOUS at 21:42

## 2025-03-30 RX ADMIN — SODIUM CHLORIDE 250 ML: 9 INJECTION, SOLUTION INTRAVENOUS at 10:05

## 2025-03-30 RX ADMIN — HEPARIN SODIUM 5000 UNIT: 5000 INJECTION, SOLUTION INTRAVENOUS; SUBCUTANEOUS at 21:42

## 2025-03-30 RX ADMIN — MORPHINE SULFATE 1 MG: 10 INJECTION INTRAVENOUS at 19:21

## 2025-03-30 RX ADMIN — MORPHINE SULFATE 1 MG: 10 INJECTION INTRAVENOUS at 15:04

## 2025-03-30 RX ADMIN — MORPHINE SULFATE 4 MG: 10 INJECTION INTRAVENOUS at 10:04

## 2025-03-30 RX ADMIN — ONDANSETRON 4 MG: 2 INJECTION, SOLUTION INTRAMUSCULAR; INTRAVENOUS at 15:04

## 2025-03-30 RX ADMIN — ONDANSETRON 4 MG: 2 INJECTION, SOLUTION INTRAMUSCULAR; INTRAVENOUS at 10:03

## 2025-03-31 VITALS — HEART RATE: 59 BPM | DIASTOLIC BLOOD PRESSURE: 79 MMHG | SYSTOLIC BLOOD PRESSURE: 118 MMHG

## 2025-03-31 VITALS
HEART RATE: 71 BPM | OXYGEN SATURATION: 98 % | DIASTOLIC BLOOD PRESSURE: 74 MMHG | SYSTOLIC BLOOD PRESSURE: 112 MMHG | RESPIRATION RATE: 18 BRPM | TEMPERATURE: 96.98 F

## 2025-03-31 VITALS
DIASTOLIC BLOOD PRESSURE: 72 MMHG | OXYGEN SATURATION: 98 % | RESPIRATION RATE: 16 BRPM | TEMPERATURE: 98.1 F | HEART RATE: 77 BPM | SYSTOLIC BLOOD PRESSURE: 116 MMHG

## 2025-03-31 VITALS
DIASTOLIC BLOOD PRESSURE: 75 MMHG | SYSTOLIC BLOOD PRESSURE: 136 MMHG | OXYGEN SATURATION: 90 % | HEART RATE: 80 BPM | RESPIRATION RATE: 18 BRPM | TEMPERATURE: 99.6 F

## 2025-03-31 VITALS
DIASTOLIC BLOOD PRESSURE: 86 MMHG | RESPIRATION RATE: 16 BRPM | SYSTOLIC BLOOD PRESSURE: 121 MMHG | HEART RATE: 73 BPM | TEMPERATURE: 96.98 F | OXYGEN SATURATION: 99 %

## 2025-03-31 VITALS — HEART RATE: 72 BPM

## 2025-03-31 VITALS — HEART RATE: 70 BPM

## 2025-03-31 LAB
ALBUMIN SERPL-MCNC: 3.3 GM/DL (ref 3.5–5)
ALBUMIN/GLOB SERPL: 1.3 {RATIO} (ref 1.2–2.2)
ALP SERPL-CCNC: 72 U/L (ref 46–116)
ALT SERPL-CCNC: 7 U/L (ref 10–49)
ANION GAP SERPL CALC-SCNC: 7 MMOL/L (ref 7–16)
AST SERPL-CCNC: < 10 U/L (ref 0–34)
BASOPHILS NFR BLD AUTO: 0 % (ref 0–2.5)
BILIRUB SERPL-MCNC: 1.3 MG/DL (ref 0.3–1.2)
BUN SERPL-MCNC: 9 MG/DL (ref 9–23)
BUN/CREAT SERPL: 13 RATIO (ref 12–20)
CALCIUM SERPL-MCNC: 8.4 MG/DL (ref 8.3–10.6)
CHLORIDE SERPL-SCNC: 100 MMOL/L (ref 98–107)
CO2 SERPL-SCNC: 28.8 MMOL/L (ref 20–31)
CREAT CL PREDICTED SERPL C-G-VRATE: 104.6 ML/MIN (ref 60–?)
CREAT SERPL-MCNC: 0.7 MG/DL (ref 0.6–1.3)
CRP SERPL-MCNC: 20.2 MG/DL (ref 0–0.9)
EGFR: > 60 SEE NOTE
EOSINOPHIL # BLD AUTO: 0.1 THOU/MM3 (ref 0–0.5)
EOSINOPHIL NFR BLD AUTO: 1 % (ref 0–10)
GLOBULIN SER CALC-MCNC: 2.6 GM/DL (ref 2.3–3.5)
GLUCOSE SERPL-MCNC: 104 MG/DL (ref 74–106)
HCT VFR BLD AUTO: 31.3 % (ref 41–53)
HGB BLD-MCNC: 9.6 G/DL (ref 13.5–16)
IMM GRANULOCYTES # BLD AUTO: 0.02 THOU/MM3 (ref 0–0)
IMM GRANULOCYTES NFR BLD AUTO: 0 % (ref 0–0)
LYMPHOCYTES # BLD AUTO: 1.2 THOU/MM3 (ref 1–4.8)
LYMPHOCYTES NFR BLD AUTO: 20 % (ref 10–50)
MCH RBC QN AUTO: 21.1 PG (ref 25–35)
MCHC RBC AUTO-ENTMCNC: 30.7 G/DL (ref 31–37)
MCV RBC AUTO: 69 FL (ref 80–100)
MONOCYTES # BLD AUTO: 0.5 THOU/MM3 (ref 0–0.8)
MONOCYTES NFR BLD AUTO: 8 % (ref 0–12)
NEUTROPHILS # BLD AUTO: 4.5 THOU/MM3 (ref 1.8–7.7)
NEUTROPHILS NFR BLD AUTO: 71 % (ref 37–80)
NRBC BLD-RTO: 0 /100 WBC
OSMOLALITY,CALCULATED: 270 (ref 275–295)
PLATELET COUNT: 267 THOU/MM3 (ref 140–440)
POTASSIUM: 3.9 MMOL/L (ref 3.4–5.1)
RDW STANDARD DEVIATION: 49.4 FL (ref 35.1–43.9)
RED BLOOD COUNT: 4.55 MILN/MM3 (ref 4.5–5.9)
SODIUM: 136 MMOL/L (ref 136–145)
TOTAL PROTEIN: 5.9 GM/DL (ref 5.7–8.2)
WHITE BLOOD COUNT: 6.4 THOU/MM3 (ref 3.8–10.6)

## 2025-03-31 RX ADMIN — METHYLPREDNISOLONE SODIUM SUCCINATE 40 MG: 125 INJECTION, POWDER, FOR SOLUTION INTRAMUSCULAR; INTRAVENOUS at 08:27

## 2025-03-31 RX ADMIN — PIPERACILLIN SODIUM AND TAZOBACTAM SODIUM 12.5 GM: 3; .375 INJECTION, SOLUTION INTRAVENOUS at 05:11

## 2025-03-31 RX ADMIN — METHYLPREDNISOLONE SODIUM SUCCINATE 40 MG: 125 INJECTION, POWDER, FOR SOLUTION INTRAMUSCULAR; INTRAVENOUS at 21:29

## 2025-03-31 RX ADMIN — PIPERACILLIN SODIUM AND TAZOBACTAM SODIUM 12.5 GM: 3; .375 INJECTION, SOLUTION INTRAVENOUS at 14:34

## 2025-03-31 RX ADMIN — HEPARIN SODIUM 5000 UNIT: 5000 INJECTION, SOLUTION INTRAVENOUS; SUBCUTANEOUS at 21:36

## 2025-03-31 RX ADMIN — PIPERACILLIN SODIUM AND TAZOBACTAM SODIUM 12.5 GM: 3; .375 INJECTION, SOLUTION INTRAVENOUS at 21:33

## 2025-04-01 VITALS
SYSTOLIC BLOOD PRESSURE: 126 MMHG | RESPIRATION RATE: 18 BRPM | TEMPERATURE: 97.2 F | OXYGEN SATURATION: 95 % | HEART RATE: 68 BPM | DIASTOLIC BLOOD PRESSURE: 87 MMHG

## 2025-04-01 VITALS — TEMPERATURE: 97.1 F | DIASTOLIC BLOOD PRESSURE: 73 MMHG | HEART RATE: 56 BPM | SYSTOLIC BLOOD PRESSURE: 109 MMHG

## 2025-04-01 VITALS
RESPIRATION RATE: 16 BRPM | OXYGEN SATURATION: 100 % | SYSTOLIC BLOOD PRESSURE: 134 MMHG | DIASTOLIC BLOOD PRESSURE: 65 MMHG | HEART RATE: 61 BPM

## 2025-04-01 VITALS
HEART RATE: 64 BPM | RESPIRATION RATE: 24 BRPM | OXYGEN SATURATION: 100 % | SYSTOLIC BLOOD PRESSURE: 105 MMHG | DIASTOLIC BLOOD PRESSURE: 74 MMHG

## 2025-04-01 VITALS
HEART RATE: 60 BPM | DIASTOLIC BLOOD PRESSURE: 72 MMHG | RESPIRATION RATE: 14 BRPM | TEMPERATURE: 96.44 F | SYSTOLIC BLOOD PRESSURE: 112 MMHG | OXYGEN SATURATION: 99 %

## 2025-04-01 VITALS — HEART RATE: 62 BPM

## 2025-04-01 VITALS
SYSTOLIC BLOOD PRESSURE: 112 MMHG | HEART RATE: 59 BPM | OXYGEN SATURATION: 98 % | TEMPERATURE: 98.1 F | DIASTOLIC BLOOD PRESSURE: 75 MMHG | RESPIRATION RATE: 19 BRPM

## 2025-04-01 VITALS
HEART RATE: 57 BPM | TEMPERATURE: 97.2 F | OXYGEN SATURATION: 99 % | DIASTOLIC BLOOD PRESSURE: 86 MMHG | SYSTOLIC BLOOD PRESSURE: 134 MMHG | RESPIRATION RATE: 17 BRPM

## 2025-04-01 VITALS
TEMPERATURE: 97.16 F | DIASTOLIC BLOOD PRESSURE: 88 MMHG | SYSTOLIC BLOOD PRESSURE: 132 MMHG | OXYGEN SATURATION: 98 % | HEART RATE: 68 BPM | RESPIRATION RATE: 18 BRPM

## 2025-04-01 VITALS
OXYGEN SATURATION: 100 % | DIASTOLIC BLOOD PRESSURE: 76 MMHG | RESPIRATION RATE: 25 BRPM | SYSTOLIC BLOOD PRESSURE: 111 MMHG | HEART RATE: 67 BPM

## 2025-04-01 VITALS
SYSTOLIC BLOOD PRESSURE: 101 MMHG | HEART RATE: 60 BPM | RESPIRATION RATE: 14 BRPM | TEMPERATURE: 96 F | OXYGEN SATURATION: 98 % | DIASTOLIC BLOOD PRESSURE: 67 MMHG

## 2025-04-01 VITALS — TEMPERATURE: 95.9 F | HEART RATE: 73 BPM | SYSTOLIC BLOOD PRESSURE: 104 MMHG | DIASTOLIC BLOOD PRESSURE: 64 MMHG

## 2025-04-01 VITALS — HEART RATE: 72 BPM

## 2025-04-01 VITALS
HEART RATE: 66 BPM | SYSTOLIC BLOOD PRESSURE: 109 MMHG | DIASTOLIC BLOOD PRESSURE: 73 MMHG | RESPIRATION RATE: 13 BRPM | OXYGEN SATURATION: 100 %

## 2025-04-01 VITALS
DIASTOLIC BLOOD PRESSURE: 68 MMHG | HEART RATE: 55 BPM | SYSTOLIC BLOOD PRESSURE: 109 MMHG | OXYGEN SATURATION: 100 % | RESPIRATION RATE: 16 BRPM

## 2025-04-01 VITALS — HEART RATE: 55 BPM

## 2025-04-01 LAB
ALBUMIN SERPL-MCNC: 3.6 GM/DL (ref 3.5–5)
ALBUMIN/GLOB SERPL: 1.4 {RATIO} (ref 1.2–2.2)
ALP SERPL-CCNC: 84 U/L (ref 46–116)
ALT SERPL-CCNC: 9 U/L (ref 10–49)
ANION GAP SERPL CALC-SCNC: 8 MMOL/L (ref 7–16)
AST SERPL-CCNC: 11 U/L (ref 0–34)
BASOPHILS NFR BLD AUTO: 0 % (ref 0–2.5)
BILIRUB SERPL-MCNC: 1.1 MG/DL (ref 0.3–1.2)
BUN SERPL-MCNC: 14 MG/DL (ref 9–23)
BUN/CREAT SERPL: 20 RATIO (ref 12–20)
CALCIUM ALBUM COR SERPL-MCNC: 9.2 MG/DL (ref 8.5–10.1)
CALCIUM SERPL-MCNC: 8.9 MG/DL (ref 8.3–10.6)
CHLORIDE SERPL-SCNC: 99 MMOL/L (ref 98–107)
CO2 SERPL-SCNC: 29.2 MMOL/L (ref 20–31)
CREAT CL PREDICTED SERPL C-G-VRATE: 104.6 ML/MIN (ref 60–?)
CREAT SERPL-MCNC: 0.7 MG/DL (ref 0.6–1.3)
EGFR: > 60 SEE NOTE
EOSINOPHIL NFR BLD AUTO: 0 % (ref 0–10)
GLOBULIN SER CALC-MCNC: 2.6 GM/DL (ref 2.3–3.5)
GLUCOSE SERPL-MCNC: 146 MG/DL (ref 74–106)
HCT VFR BLD AUTO: 32.2 % (ref 41–53)
HGB BLD-MCNC: 9.9 G/DL (ref 13.5–16)
IMM GRANULOCYTES # BLD AUTO: 0.06 THOU/MM3 (ref 0–0)
IMM GRANULOCYTES NFR BLD AUTO: 1 % (ref 0–0)
LYMPHOCYTES # BLD AUTO: 0.9 THOU/MM3 (ref 1–4.8)
LYMPHOCYTES NFR BLD AUTO: 12 % (ref 10–50)
MCHC RBC AUTO-ENTMCNC: 30.7 G/DL (ref 31–37)
MCV RBC AUTO: 68 FL (ref 80–100)
MONOCYTES # BLD AUTO: 0.2 THOU/MM3 (ref 0–0.8)
MONOCYTES NFR BLD AUTO: 2 % (ref 0–12)
NEUTROPHILS # BLD AUTO: 6.6 THOU/MM3 (ref 1.8–7.7)
NEUTROPHILS NFR BLD AUTO: 86 % (ref 37–80)
NRBC BLD-RTO: 0 /100 WBC
OSMOLALITY,CALCULATED: 275 (ref 275–295)
PLATELET COUNT: 296 THOU/MM3 (ref 140–440)
POTASSIUM: 4.4 MMOL/L (ref 3.4–5.1)
RDW STANDARD DEVIATION: 47.8 FL (ref 35.1–43.9)
RED BLOOD COUNT: 4.72 MILN/MM3 (ref 4.5–5.9)
SODIUM: 136 MMOL/L (ref 136–145)
TOTAL PROTEIN: 6.2 GM/DL (ref 5.7–8.2)
WHITE BLOOD COUNT: 7.7 THOU/MM3 (ref 3.8–10.6)

## 2025-04-01 RX ADMIN — MORPHINE SULFATE 1 MG: 10 INJECTION INTRAVENOUS at 16:02

## 2025-04-01 RX ADMIN — ACETAMINOPHEN 325MG 650 MG: 325 TABLET ORAL at 22:14

## 2025-04-01 RX ADMIN — ONDANSETRON 4 MG: 2 INJECTION, SOLUTION INTRAMUSCULAR; INTRAVENOUS at 16:02

## 2025-04-01 RX ADMIN — METHYLPREDNISOLONE SODIUM SUCCINATE 40 MG: 125 INJECTION, POWDER, FOR SOLUTION INTRAMUSCULAR; INTRAVENOUS at 21:55

## 2025-04-01 RX ADMIN — PIPERACILLIN SODIUM AND TAZOBACTAM SODIUM 12.5 GM: 3; .375 INJECTION, SOLUTION INTRAVENOUS at 05:18

## 2025-04-01 RX ADMIN — PIPERACILLIN SODIUM AND TAZOBACTAM SODIUM 12.5 GM: 3; .375 INJECTION, SOLUTION INTRAVENOUS at 16:02

## 2025-04-01 RX ADMIN — LIDOCAINE HYDROCHLORIDE 15 ML: 10 INJECTION, SOLUTION EPIDURAL; INFILTRATION; INTRACAUDAL; PERINEURAL at 14:27

## 2025-04-01 RX ADMIN — FENTANYL CITRATE 125 MCG: 50 INJECTION INTRAMUSCULAR; INTRAVENOUS at 14:17

## 2025-04-01 RX ADMIN — PIPERACILLIN SODIUM AND TAZOBACTAM SODIUM 12.5 GM: 3; .375 INJECTION, SOLUTION INTRAVENOUS at 21:54

## 2025-04-01 RX ADMIN — METHYLPREDNISOLONE SODIUM SUCCINATE 40 MG: 125 INJECTION, POWDER, FOR SOLUTION INTRAMUSCULAR; INTRAVENOUS at 09:52

## 2025-04-01 RX ADMIN — SODIUM CHLORIDE 999 ML: 9 INJECTION, SOLUTION INTRAVENOUS at 14:08

## 2025-04-02 VITALS
TEMPERATURE: 97.34 F | DIASTOLIC BLOOD PRESSURE: 87 MMHG | SYSTOLIC BLOOD PRESSURE: 138 MMHG | RESPIRATION RATE: 17 BRPM | HEART RATE: 63 BPM | OXYGEN SATURATION: 99 %

## 2025-04-02 VITALS
SYSTOLIC BLOOD PRESSURE: 104 MMHG | DIASTOLIC BLOOD PRESSURE: 67 MMHG | TEMPERATURE: 97.16 F | HEART RATE: 47 BPM | OXYGEN SATURATION: 96 % | RESPIRATION RATE: 18 BRPM

## 2025-04-02 VITALS
DIASTOLIC BLOOD PRESSURE: 86 MMHG | RESPIRATION RATE: 19 BRPM | SYSTOLIC BLOOD PRESSURE: 133 MMHG | HEART RATE: 56 BPM | TEMPERATURE: 96.98 F | OXYGEN SATURATION: 98 %

## 2025-04-02 VITALS
DIASTOLIC BLOOD PRESSURE: 84 MMHG | RESPIRATION RATE: 16 BRPM | HEART RATE: 51 BPM | TEMPERATURE: 97.16 F | OXYGEN SATURATION: 98 % | SYSTOLIC BLOOD PRESSURE: 131 MMHG

## 2025-04-02 VITALS
HEART RATE: 52 BPM | DIASTOLIC BLOOD PRESSURE: 80 MMHG | TEMPERATURE: 98.42 F | SYSTOLIC BLOOD PRESSURE: 121 MMHG | RESPIRATION RATE: 18 BRPM | OXYGEN SATURATION: 98 %

## 2025-04-02 VITALS
OXYGEN SATURATION: 98 % | DIASTOLIC BLOOD PRESSURE: 69 MMHG | SYSTOLIC BLOOD PRESSURE: 104 MMHG | HEART RATE: 55 BPM | RESPIRATION RATE: 15 BRPM | TEMPERATURE: 97.52 F

## 2025-04-02 VITALS — HEART RATE: 54 BPM

## 2025-04-02 LAB
ALBUMIN SERPL-MCNC: 3.5 GM/DL (ref 3.5–5)
ALBUMIN/GLOB SERPL: 1.5 {RATIO} (ref 1.2–2.2)
ALP SERPL-CCNC: 84 U/L (ref 46–116)
ALT SERPL-CCNC: 8 U/L (ref 10–49)
ANION GAP SERPL CALC-SCNC: 9 MMOL/L (ref 7–16)
AST SERPL-CCNC: < 10 U/L (ref 0–34)
BASOPHILS NFR BLD AUTO: 0 % (ref 0–2.5)
BILIRUB SERPL-MCNC: 0.6 MG/DL (ref 0.3–1.2)
BUN SERPL-MCNC: 17 MG/DL (ref 9–23)
BUN/CREAT SERPL: 21 RATIO (ref 12–20)
CALCIUM ALBUM COR SERPL-MCNC: 9.1 MG/DL (ref 8.5–10.1)
CALCIUM SERPL-MCNC: 8.7 MG/DL (ref 8.3–10.6)
CHLORIDE SERPL-SCNC: 100 MMOL/L (ref 98–107)
CO2 SERPL-SCNC: 29.5 MMOL/L (ref 20–31)
CREAT CL PREDICTED SERPL C-G-VRATE: 91.5 ML/MIN (ref 60–?)
CREAT SERPL-MCNC: 0.8 MG/DL (ref 0.6–1.3)
EGFR: > 60 SEE NOTE
EOSINOPHIL NFR BLD AUTO: 0 % (ref 0–10)
GLOBULIN SER CALC-MCNC: 2.4 GM/DL (ref 2.3–3.5)
GLUCOSE SERPL-MCNC: 163 MG/DL (ref 74–106)
HCT VFR BLD AUTO: 30.8 % (ref 41–53)
HGB BLD-MCNC: 9.6 G/DL (ref 13.5–16)
IMM GRANULOCYTES # BLD AUTO: 0.03 THOU/MM3 (ref 0–0)
IMM GRANULOCYTES NFR BLD AUTO: 0 % (ref 0–0)
LYMPHOCYTES # BLD AUTO: 0.5 THOU/MM3 (ref 1–4.8)
LYMPHOCYTES NFR BLD AUTO: 6 % (ref 10–50)
MCH RBC QN AUTO: 21.1 PG (ref 25–35)
MCHC RBC AUTO-ENTMCNC: 31.2 G/DL (ref 31–37)
MCV RBC AUTO: 68 FL (ref 80–100)
MONOCYTES # BLD AUTO: 0.2 THOU/MM3 (ref 0–0.8)
MONOCYTES NFR BLD AUTO: 2 % (ref 0–12)
NEUTROPHILS # BLD AUTO: 8.3 THOU/MM3 (ref 1.8–7.7)
NEUTROPHILS NFR BLD AUTO: 92 % (ref 37–80)
NRBC BLD-RTO: 0 /100 WBC
OSMOLALITY,CALCULATED: 281 (ref 275–295)
PLATELET COUNT: 321 THOU/MM3 (ref 140–440)
POTASSIUM: 4.3 MMOL/L (ref 3.4–5.1)
RDW STANDARD DEVIATION: 48.4 FL (ref 35.1–43.9)
RED BLOOD COUNT: 4.56 MILN/MM3 (ref 4.5–5.9)
SODIUM: 138 MMOL/L (ref 136–145)
TOTAL PROTEIN: 5.9 GM/DL (ref 5.7–8.2)
WHITE BLOOD COUNT: 9.1 THOU/MM3 (ref 3.8–10.6)

## 2025-04-02 RX ADMIN — PIPERACILLIN SODIUM AND TAZOBACTAM SODIUM 12.5 GM: 3; .375 INJECTION, SOLUTION INTRAVENOUS at 05:39

## 2025-04-02 RX ADMIN — METHYLPREDNISOLONE SODIUM SUCCINATE 40 MG: 125 INJECTION, POWDER, FOR SOLUTION INTRAMUSCULAR; INTRAVENOUS at 09:36

## 2025-04-02 RX ADMIN — PIPERACILLIN SODIUM AND TAZOBACTAM SODIUM 12.5 GM: 3; .375 INJECTION, SOLUTION INTRAVENOUS at 22:01

## 2025-04-02 RX ADMIN — PIPERACILLIN SODIUM AND TAZOBACTAM SODIUM 12.5 GM: 3; .375 INJECTION, SOLUTION INTRAVENOUS at 13:28

## 2025-04-03 VITALS
TEMPERATURE: 96.8 F | RESPIRATION RATE: 16 BRPM | SYSTOLIC BLOOD PRESSURE: 110 MMHG | HEART RATE: 45 BPM | OXYGEN SATURATION: 98 % | DIASTOLIC BLOOD PRESSURE: 72 MMHG

## 2025-04-03 VITALS
DIASTOLIC BLOOD PRESSURE: 75 MMHG | OXYGEN SATURATION: 98 % | SYSTOLIC BLOOD PRESSURE: 117 MMHG | TEMPERATURE: 97.16 F | HEART RATE: 42 BPM | RESPIRATION RATE: 16 BRPM

## 2025-04-03 VITALS — HEART RATE: 47 BPM

## 2025-04-03 VITALS
SYSTOLIC BLOOD PRESSURE: 124 MMHG | OXYGEN SATURATION: 98 % | HEART RATE: 51 BPM | RESPIRATION RATE: 16 BRPM | TEMPERATURE: 96.9 F | DIASTOLIC BLOOD PRESSURE: 86 MMHG

## 2025-04-03 VITALS — HEART RATE: 41 BPM

## 2025-04-03 VITALS — HEART RATE: 45 BPM

## 2025-04-03 VITALS
OXYGEN SATURATION: 98 % | DIASTOLIC BLOOD PRESSURE: 88 MMHG | RESPIRATION RATE: 16 BRPM | HEART RATE: 47 BPM | SYSTOLIC BLOOD PRESSURE: 142 MMHG | TEMPERATURE: 97 F

## 2025-04-03 VITALS
SYSTOLIC BLOOD PRESSURE: 130 MMHG | HEART RATE: 48 BPM | OXYGEN SATURATION: 99 % | TEMPERATURE: 97.1 F | DIASTOLIC BLOOD PRESSURE: 85 MMHG | RESPIRATION RATE: 16 BRPM

## 2025-04-03 LAB
ALBUMIN SERPL-MCNC: 3.4 GM/DL (ref 3.5–5)
ALBUMIN/GLOB SERPL: 1.4 {RATIO} (ref 1.2–2.2)
ALP SERPL-CCNC: 76 U/L (ref 46–116)
ALT SERPL-CCNC: 9 U/L (ref 10–49)
ANION GAP SERPL CALC-SCNC: 9 MMOL/L (ref 7–16)
AST SERPL-CCNC: 11 U/L (ref 0–34)
BASOPHILS NFR BLD AUTO: 0 % (ref 0–2.5)
BILIRUB SERPL-MCNC: 0.5 MG/DL (ref 0.3–1.2)
BUN SERPL-MCNC: 19 MG/DL (ref 9–23)
BUN/CREAT SERPL: 27 RATIO (ref 12–20)
CALCIUM ALBUM COR SERPL-MCNC: 9.7 MG/DL (ref 8.5–10.1)
CALCIUM SERPL-MCNC: 9.2 MG/DL (ref 8.3–10.6)
CHLORIDE SERPL-SCNC: 99 MMOL/L (ref 98–107)
CO2 SERPL-SCNC: 30.7 MMOL/L (ref 20–31)
CREAT CL PREDICTED SERPL C-G-VRATE: 100.8 ML/MIN (ref 60–?)
CREAT SERPL-MCNC: 0.7 MG/DL (ref 0.6–1.3)
EGFR: > 60 SEE NOTE
EOSINOPHIL NFR BLD AUTO: 0 % (ref 0–10)
GLOBULIN SER CALC-MCNC: 2.5 GM/DL (ref 2.3–3.5)
GLUCOSE SERPL-MCNC: 150 MG/DL (ref 74–106)
HCT VFR BLD AUTO: 30.1 % (ref 41–53)
HGB BLD-MCNC: 9.3 G/DL (ref 13.5–16)
IMM GRANULOCYTES # BLD AUTO: 0.03 THOU/MM3 (ref 0–0)
IMM GRANULOCYTES NFR BLD AUTO: 1 % (ref 0–0)
LYMPHOCYTES # BLD AUTO: 0.5 THOU/MM3 (ref 1–4.8)
LYMPHOCYTES NFR BLD AUTO: 11 % (ref 10–50)
MCH RBC QN AUTO: 21.2 PG (ref 25–35)
MCHC RBC AUTO-ENTMCNC: 30.9 G/DL (ref 31–37)
MCV RBC AUTO: 69 FL (ref 80–100)
MONOCYTES # BLD AUTO: 0.2 THOU/MM3 (ref 0–0.8)
MONOCYTES NFR BLD AUTO: 4 % (ref 0–12)
NEUTROPHILS # BLD AUTO: 3.4 THOU/MM3 (ref 1.8–7.7)
NEUTROPHILS NFR BLD AUTO: 84 % (ref 37–80)
NRBC BLD-RTO: 0 /100 WBC
OSMOLALITY,CALCULATED: 282 (ref 275–295)
PLATELET COUNT: 273 THOU/MM3 (ref 140–440)
POTASSIUM: 4.4 MMOL/L (ref 3.4–5.1)
RDW STANDARD DEVIATION: 49.6 FL (ref 35.1–43.9)
RED BLOOD COUNT: 4.38 MILN/MM3 (ref 4.5–5.9)
SODIUM: 139 MMOL/L (ref 136–145)
TOTAL PROTEIN: 5.9 GM/DL (ref 5.7–8.2)
WHITE BLOOD COUNT: 4.1 THOU/MM3 (ref 3.8–10.6)

## 2025-04-03 RX ADMIN — PIPERACILLIN SODIUM AND TAZOBACTAM SODIUM 12.5 GM: 3; .375 INJECTION, SOLUTION INTRAVENOUS at 13:35

## 2025-04-03 RX ADMIN — PIPERACILLIN SODIUM AND TAZOBACTAM SODIUM 12.5 GM: 3; .375 INJECTION, SOLUTION INTRAVENOUS at 21:53

## 2025-04-03 RX ADMIN — ACETAMINOPHEN 325MG 650 MG: 325 TABLET ORAL at 15:07

## 2025-04-03 RX ADMIN — PIPERACILLIN SODIUM AND TAZOBACTAM SODIUM 12.5 GM: 3; .375 INJECTION, SOLUTION INTRAVENOUS at 05:41

## 2025-04-04 VITALS
DIASTOLIC BLOOD PRESSURE: 76 MMHG | TEMPERATURE: 97.16 F | HEART RATE: 56 BPM | SYSTOLIC BLOOD PRESSURE: 122 MMHG | OXYGEN SATURATION: 96 % | RESPIRATION RATE: 19 BRPM

## 2025-04-04 VITALS
SYSTOLIC BLOOD PRESSURE: 113 MMHG | DIASTOLIC BLOOD PRESSURE: 77 MMHG | OXYGEN SATURATION: 99 % | RESPIRATION RATE: 16 BRPM | HEART RATE: 49 BPM | TEMPERATURE: 96.62 F

## 2025-04-04 VITALS
TEMPERATURE: 97.16 F | DIASTOLIC BLOOD PRESSURE: 69 MMHG | RESPIRATION RATE: 16 BRPM | SYSTOLIC BLOOD PRESSURE: 102 MMHG | HEART RATE: 44 BPM | OXYGEN SATURATION: 99 %

## 2025-04-04 VITALS
OXYGEN SATURATION: 98 % | HEART RATE: 41 BPM | DIASTOLIC BLOOD PRESSURE: 74 MMHG | SYSTOLIC BLOOD PRESSURE: 111 MMHG | RESPIRATION RATE: 17 BRPM | TEMPERATURE: 97.16 F

## 2025-04-04 LAB
ALBUMIN SERPL-MCNC: 3.1 GM/DL (ref 3.5–5)
ALBUMIN/GLOB SERPL: 1.4 {RATIO} (ref 1.2–2.2)
ALP SERPL-CCNC: 78 U/L (ref 46–116)
ALT SERPL-CCNC: 8 U/L (ref 10–49)
ANION GAP SERPL CALC-SCNC: 8 MMOL/L (ref 7–16)
AST SERPL-CCNC: < 10 U/L (ref 0–34)
BASOPHILS NFR BLD AUTO: 0 % (ref 0–2.5)
BILIRUB SERPL-MCNC: 0.4 MG/DL (ref 0.3–1.2)
BUN SERPL-MCNC: 18 MG/DL (ref 9–23)
BUN/CREAT SERPL: 26 RATIO (ref 12–20)
CALCIUM ALBUM COR SERPL-MCNC: 9.3 MG/DL (ref 8.5–10.1)
CALCIUM SERPL-MCNC: 8.6 MG/DL (ref 8.3–10.6)
CHLORIDE SERPL-SCNC: 97 MMOL/L (ref 98–107)
CO2 SERPL-SCNC: 31.4 MMOL/L (ref 20–31)
CREAT CL PREDICTED SERPL C-G-VRATE: 100.7 ML/MIN (ref 60–?)
CREAT SERPL-MCNC: 0.7 MG/DL (ref 0.6–1.3)
EGFR: > 60 SEE NOTE
EOSINOPHIL NFR BLD AUTO: 0 % (ref 0–10)
GLOBULIN SER CALC-MCNC: 2.2 GM/DL (ref 2.3–3.5)
GLUCOSE SERPL-MCNC: 156 MG/DL (ref 74–106)
HCT VFR BLD AUTO: 29.6 % (ref 41–53)
IMM GRANULOCYTES # BLD AUTO: 0.04 THOU/MM3 (ref 0–0)
IMM GRANULOCYTES NFR BLD AUTO: 1 % (ref 0–0)
LYMPHOCYTES # BLD AUTO: 0.6 THOU/MM3 (ref 1–4.8)
LYMPHOCYTES NFR BLD AUTO: 9 % (ref 10–50)
MCH RBC QN AUTO: 21.1 PG (ref 25–35)
MCHC RBC AUTO-ENTMCNC: 30.4 G/DL (ref 31–37)
MCV RBC AUTO: 70 FL (ref 80–100)
MONOCYTES # BLD AUTO: 0.2 THOU/MM3 (ref 0–0.8)
MONOCYTES NFR BLD AUTO: 3 % (ref 0–12)
NEUTROPHILS # BLD AUTO: 5.9 THOU/MM3 (ref 1.8–7.7)
NEUTROPHILS NFR BLD AUTO: 87 % (ref 37–80)
NRBC BLD-RTO: 0 /100 WBC
OSMOLALITY,CALCULATED: 276 (ref 275–295)
PLATELET COUNT: 290 THOU/MM3 (ref 140–440)
POTASSIUM: 4.4 MMOL/L (ref 3.4–5.1)
RDW STANDARD DEVIATION: 49.9 FL (ref 35.1–43.9)
RED BLOOD COUNT: 4.26 MILN/MM3 (ref 4.5–5.9)
SODIUM: 136 MMOL/L (ref 136–145)
TOTAL PROTEIN: 5.3 GM/DL (ref 5.7–8.2)
WHITE BLOOD COUNT: 6.8 THOU/MM3 (ref 3.8–10.6)

## 2025-04-04 RX ADMIN — PIPERACILLIN SODIUM AND TAZOBACTAM SODIUM 12.5 GM: 3; .375 INJECTION, SOLUTION INTRAVENOUS at 06:17

## 2025-04-18 ENCOUNTER — HOSPITAL ENCOUNTER (OUTPATIENT)
Age: 50
Discharge: HOME | End: 2025-04-18
Payer: MEDICAID

## 2025-04-18 DIAGNOSIS — D64.9: ICD-10-CM

## 2025-04-18 DIAGNOSIS — S31.819D: Primary | ICD-10-CM

## 2025-04-18 DIAGNOSIS — K50.90: ICD-10-CM

## 2025-04-18 DIAGNOSIS — L98.412: ICD-10-CM
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
## 2025-04-18 DIAGNOSIS — L92.9: ICD-10-CM

## 2025-04-18 DIAGNOSIS — N63.0: ICD-10-CM

## 2025-04-18 PROCEDURE — 99213 OFFICE O/P EST LOW 20 MIN: CPT

## 2025-04-18 PROCEDURE — G0463 HOSPITAL OUTPT CLINIC VISIT: HCPCS

## 2025-05-02 ENCOUNTER — HOSPITAL ENCOUNTER (OUTPATIENT)
Dept: HOSPITAL 104 - SWHD | Age: 50
Discharge: HOME | End: 2025-05-02
Payer: MEDICAID

## 2025-05-02 DIAGNOSIS — L92.9: ICD-10-CM

## 2025-05-02 DIAGNOSIS — K50.90: ICD-10-CM

## 2025-05-02 DIAGNOSIS — L98.412: ICD-10-CM
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
## 2025-05-02 DIAGNOSIS — S31.819D: Primary | ICD-10-CM

## 2025-05-02 DIAGNOSIS — D64.9: ICD-10-CM

## 2025-05-02 DIAGNOSIS — N63.0: ICD-10-CM

## 2025-05-02 PROCEDURE — A9270 NON-COVERED ITEM OR SERVICE: HCPCS

## 2025-05-02 PROCEDURE — 10060 I&D ABSCESS SIMPLE/SINGLE: CPT

## 2025-05-09 ENCOUNTER — HOSPITAL ENCOUNTER (OUTPATIENT)
Dept: HOSPITAL 104 - SWHD | Age: 50
Discharge: HOME | End: 2025-05-09
Payer: MEDICAID

## 2025-05-09 DIAGNOSIS — L92.9: ICD-10-CM

## 2025-05-09 DIAGNOSIS — N63.0: ICD-10-CM

## 2025-05-09 DIAGNOSIS — S31.819D: Primary | ICD-10-CM

## 2025-05-09 DIAGNOSIS — K50.90: ICD-10-CM
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
## 2025-05-09 DIAGNOSIS — D64.9: ICD-10-CM

## 2025-05-09 DIAGNOSIS — L98.411: ICD-10-CM

## 2025-05-09 DIAGNOSIS — L02.818: ICD-10-CM

## 2025-05-09 PROCEDURE — G0463 HOSPITAL OUTPT CLINIC VISIT: HCPCS

## 2025-05-09 PROCEDURE — 99213 OFFICE O/P EST LOW 20 MIN: CPT

## 2025-05-09 PROCEDURE — A9270 NON-COVERED ITEM OR SERVICE: HCPCS

## 2025-05-20 ENCOUNTER — HOSPITAL ENCOUNTER (OUTPATIENT)
Dept: HOSPITAL 104 - SWHD | Age: 50
Discharge: HOME | End: 2025-05-20
Payer: MEDICAID

## 2025-05-20 DIAGNOSIS — L02.818: ICD-10-CM

## 2025-05-20 DIAGNOSIS — L92.9: ICD-10-CM

## 2025-05-20 DIAGNOSIS — N63.0: ICD-10-CM

## 2025-05-20 DIAGNOSIS — K50.90: ICD-10-CM

## 2025-05-20 DIAGNOSIS — D64.9: ICD-10-CM
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
## 2025-05-20 DIAGNOSIS — S31.819D: Primary | ICD-10-CM

## 2025-05-20 PROCEDURE — A9270 NON-COVERED ITEM OR SERVICE: HCPCS

## 2025-05-20 PROCEDURE — 17250 CHEM CAUT OF GRANLTJ TISSUE: CPT

## 2025-05-23 ENCOUNTER — HOSPITAL ENCOUNTER (OUTPATIENT)
Dept: HOSPITAL 104 - SWHD | Age: 50
Discharge: HOME | End: 2025-05-23
Payer: MEDICAID

## 2025-05-23 DIAGNOSIS — K50.90: ICD-10-CM

## 2025-05-23 DIAGNOSIS — S31.819D: Primary | ICD-10-CM

## 2025-05-23 DIAGNOSIS — L02.818: ICD-10-CM
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
## 2025-05-23 DIAGNOSIS — L92.9: ICD-10-CM

## 2025-05-23 DIAGNOSIS — D64.9: ICD-10-CM

## 2025-05-23 DIAGNOSIS — N63.0: ICD-10-CM

## 2025-05-23 PROCEDURE — 99213 OFFICE O/P EST LOW 20 MIN: CPT

## 2025-05-23 PROCEDURE — A9270 NON-COVERED ITEM OR SERVICE: HCPCS

## 2025-05-23 PROCEDURE — G0463 HOSPITAL OUTPT CLINIC VISIT: HCPCS

## 2025-06-06 ENCOUNTER — HOSPITAL ENCOUNTER (OUTPATIENT)
Dept: HOSPITAL 104 - SWHD | Age: 50
Discharge: HOME | End: 2025-06-06
Payer: MEDICAID

## 2025-06-06 DIAGNOSIS — N63.0: ICD-10-CM

## 2025-06-06 DIAGNOSIS — D64.9: ICD-10-CM

## 2025-06-06 DIAGNOSIS — L02.818: ICD-10-CM

## 2025-06-06 DIAGNOSIS — K50.90: ICD-10-CM

## 2025-06-06 DIAGNOSIS — S31.819D: Primary | ICD-10-CM
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
## 2025-06-06 DIAGNOSIS — L92.9: ICD-10-CM

## 2025-06-06 PROCEDURE — 99213 OFFICE O/P EST LOW 20 MIN: CPT

## 2025-06-06 PROCEDURE — A9270 NON-COVERED ITEM OR SERVICE: HCPCS

## 2025-06-06 PROCEDURE — G0463 HOSPITAL OUTPT CLINIC VISIT: HCPCS

## 2025-06-20 ENCOUNTER — HOSPITAL ENCOUNTER (OUTPATIENT)
Dept: HOSPITAL 104 - SWHD | Age: 50
Discharge: HOME | End: 2025-06-20
Payer: MEDICAID

## 2025-06-20 DIAGNOSIS — N63.0: ICD-10-CM

## 2025-06-20 DIAGNOSIS — K50.90: ICD-10-CM

## 2025-06-20 DIAGNOSIS — L98.412: ICD-10-CM
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
## 2025-06-20 DIAGNOSIS — L02.818: ICD-10-CM

## 2025-06-20 DIAGNOSIS — S31.819D: Primary | ICD-10-CM

## 2025-06-20 DIAGNOSIS — D64.9: ICD-10-CM

## 2025-06-20 PROCEDURE — A9270 NON-COVERED ITEM OR SERVICE: HCPCS

## 2025-06-20 PROCEDURE — G0463 HOSPITAL OUTPT CLINIC VISIT: HCPCS

## 2025-06-20 PROCEDURE — 99213 OFFICE O/P EST LOW 20 MIN: CPT

## 2025-07-11 ENCOUNTER — HOSPITAL ENCOUNTER (OUTPATIENT)
Dept: HOSPITAL 104 - SWHD | Age: 50
Discharge: HOME | End: 2025-07-11
Payer: MEDICAID

## 2025-07-11 DIAGNOSIS — L02.818: ICD-10-CM

## 2025-07-11 DIAGNOSIS — N63.0: ICD-10-CM

## 2025-07-11 DIAGNOSIS — S31.819D: Primary | ICD-10-CM

## 2025-07-11 DIAGNOSIS — D64.9: ICD-10-CM

## 2025-07-11 DIAGNOSIS — L98.412: ICD-10-CM
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
## 2025-07-11 PROCEDURE — 99213 OFFICE O/P EST LOW 20 MIN: CPT

## 2025-07-11 PROCEDURE — G0463 HOSPITAL OUTPT CLINIC VISIT: HCPCS

## 2025-07-11 PROCEDURE — A9270 NON-COVERED ITEM OR SERVICE: HCPCS
